# Patient Record
Sex: FEMALE | Race: WHITE | NOT HISPANIC OR LATINO | Employment: OTHER | URBAN - NONMETROPOLITAN AREA
[De-identification: names, ages, dates, MRNs, and addresses within clinical notes are randomized per-mention and may not be internally consistent; named-entity substitution may affect disease eponyms.]

---

## 2017-01-16 ENCOUNTER — TRANSCRIBE ORDERS (OUTPATIENT)
Dept: PHYSICAL THERAPY | Facility: HOSPITAL | Age: 27
End: 2017-01-16

## 2017-01-16 DIAGNOSIS — S13.4XXA SPRAIN OF LIGAMENTS OF CERVICAL SPINE, INITIAL ENCOUNTER: Primary | ICD-10-CM

## 2017-01-18 ENCOUNTER — HOSPITAL ENCOUNTER (OUTPATIENT)
Dept: PHYSICAL THERAPY | Facility: HOSPITAL | Age: 27
Setting detail: THERAPIES SERIES
Discharge: HOME | End: 2017-01-18

## 2017-01-18 DIAGNOSIS — M54.2 NECK PAIN: Primary | ICD-10-CM

## 2017-01-18 PROCEDURE — G8982 BODY POS GOAL STATUS: HCPCS | Performed by: PHYSICAL THERAPIST

## 2017-01-18 PROCEDURE — 97161 PT EVAL LOW COMPLEX 20 MIN: CPT | Performed by: PHYSICAL THERAPIST

## 2017-01-18 PROCEDURE — 97110 THERAPEUTIC EXERCISES: CPT | Performed by: PHYSICAL THERAPIST

## 2017-01-18 PROCEDURE — G8981 BODY POS CURRENT STATUS: HCPCS | Performed by: PHYSICAL THERAPIST
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
## 2017-01-24 ENCOUNTER — HOSPITAL ENCOUNTER (OUTPATIENT)
Dept: PHYSICAL THERAPY | Facility: HOSPITAL | Age: 27
Setting detail: THERAPIES SERIES
Discharge: HOME | End: 2017-01-24

## 2017-01-24 DIAGNOSIS — M54.2 NECK PAIN: Primary | ICD-10-CM

## 2017-01-24 PROCEDURE — 97110 THERAPEUTIC EXERCISES: CPT | Performed by: PHYSICAL THERAPIST

## 2017-01-24 PROCEDURE — 97140 MANUAL THERAPY 1/> REGIONS: CPT | Performed by: PHYSICAL THERAPIST
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
## 2017-01-26 ENCOUNTER — HOSPITAL ENCOUNTER (OUTPATIENT)
Dept: PHYSICAL THERAPY | Facility: HOSPITAL | Age: 27
Setting detail: THERAPIES SERIES
Discharge: HOME | End: 2017-01-26

## 2017-01-26 DIAGNOSIS — M54.2 NECK PAIN: Primary | ICD-10-CM

## 2017-01-26 PROCEDURE — 97110 THERAPEUTIC EXERCISES: CPT

## 2017-01-26 PROCEDURE — 97140 MANUAL THERAPY 1/> REGIONS: CPT

## 2017-01-26 NOTE — PROGRESS NOTES
Outpatient Physical Therapy Ortho Treatment Note  [facility]     Patient Name: Elisa Barrett  : 1990  MRN: [id number]  Today's Date: 2017      Visit Date: 2017    Visit Dx:    ICD-10-CM ICD-9-CM   1. Neck pain M54.2 723.1       There is no problem list on file for this patient.       Past Medical History   Diagnosis Date   • Attention deficit disorder    • Neck pain         No past surgical history on file.                          PT Assessment/Plan       17 1410 17 1348       PT Assessment    Assessment Comments She reports her pain is improving. Her thoracic and upper cervical mobility continue to be hypomobility and therefore limit her ROM and exacerbate her cervicothoracic guarding. We will continue to focus on improving mm guarding and improving ROM as we progress her towards cervical and scapular stability exercises.   -TC Elisa reported less pain today, but is still limited with her cervicothoracic mobiltiy. She forgot her home progam here last visit, so I reprinted and will have her continue on those three activities.   -KR     PT Plan    PT Plan Comments see assessment  -TC continue to work on soft tissue and joint mobiltiy. Progress with posture and scapular control.   -KR       User Key  (r) = Recorded By, (t) = Taken By, (c) = Cosigned By    Initials Name Provider Type    KAIN Tillman, PT Physical Therapist    TC Tierra Lubin, PT Physical Therapist                    Exercises       17 1410          Subjective Comments    Subjective Comments Pt reports she is feeling better.   -TC      Subjective Pain    Able to rate subjective pain? yes  -TC      Pre-Treatment Pain Level 5  -TC      Post-Treatment Pain Level 1  -TC      Exercise 1    Exercise Name 1 thoracic stretch on foam roller    /2   -TC      Cueing 1 Demo  -TC      Time (Seconds) 1 15s   -TC      Exercise 2    Exercise Name 2 chin tucks on /2 foam   -TC      Cueing 2 Demo  -TC      Sets  2 2  -TC      Reps 2 15  -TC      Exercise 3    Exercise Name 3 --  -TC      Cueing 3 --  -TC      Sets 3 --  -TC      Reps 3 --  -TC      Exercise 4    Exercise Name 4 middle trap lift offs    cues for chin tuck   -TC      Cueing 4 Demo  -TC      Sets 4 2  -TC      Reps 4 15  -TC      Exercise 5    Exercise Name 5 1/2 bolster resisted diagonals   -TC      Cueing 5 Demo  -TC      Equipment 5 Theraband  -TC      Resistance 5 Yellow  -TC      Sets 5 2  -TC      Reps 5 15  -TC      Exercise 6    Exercise Name 6 1/2 bolster resisted scapular adduction   -TC      Cueing 6 Demo  -TC      Equipment 6 Theraband  -TC      Resistance 6 Yellow  -TC      Sets 6 2  -TC      Reps 6 15  -TC      Exercise 7    Exercise Name 7 ended on prayer stretch standing and thoracic rotation and SBing stretch    -TC      Sets 7 1  -TC      Reps 7 10  -TC      Time (Seconds) 7 2s   -TC        User Key  (r) = Recorded By, (t) = Taken By, (c) = Cosigned By    Initials Name Provider Type     Tierra Lubin, PT Physical Therapist                        Manual Rx (last 36 hours)      Manual Treatments       01/26/17 1410          Manual Rx 1    Manual Rx 1 Location hooklying; cervical victor manuel, UT, LS, scalenes   -TC      Manual Rx 1 Duration 10  -TC      Manual Rx 2    Manual Rx 2 Location hooklying C3-4 sideglides   -TC      Manual Rx 2 Duration 5   -TC        User Key  (r) = Recorded By, (t) = Taken By, (c) = Cosigned By    Initials Name Provider Type     Tierra Lubin, PT Physical Therapist                PT OP Goals       01/26/17 1410 01/24/17 1348 01/18/17 1441    PT Short Term Goals    STG Date to Achieve 02/01/17  -TC 02/01/17  -KR 02/01/17  -KR    STG 1 Pt will report pain no greater than 3/10 throughout the day.   -TC Pt will report pain no greater than 3/10 throughout the day.   -KR Pt will report pain no greater than 3/10 throughout the day.   -KR    STG 1 Progress Ongoing  -TC Ongoing  -KR New  -KR    STG 1 Progress Comments  5/10 today   -TC 0/10 today, had pain yesterday with chiropracctor   -KR     STG 2 Pt will be I with initial HEP.   -TC Pt will be I with initial HEP.   -KR Pt will be I with initial HEP.   -KR    STG 2 Progress Ongoing  -TC Ongoing  -KR New  -KR    STG 2 Progress Comments reports compliance   -TC reports compliance  -KR     Long Term Goals    LTG Date to Achieve 02/15/17  -TC 02/15/17  -KR 02/15/17  -KR    LTG 1 Pt will be I with HEP to improve posture and decrease strain on muscles and spine   -TC Pt will be I with HEP to improve posture and decrease strain on muscles and spine   -KR Pt will be I with HEP to improve posture and decrease strain on muscles and spine   -KR    LTG 1 Progress Ongoing  -TC Ongoing  -KR New  -KR    LTG 2 Pt will have 65 degrees or greater cervical rotation in order to improve looking over shoulder.   -TC Pt will have 65 degrees or greater cervical rotation in order to improve looking over shoulder.   -KR Pt will have 65 degrees or greater cervical rotation in order to improve looking over shoulder.   -KR    LTG 2 Progress Ongoing  -TC Ongoing  -KR New  -KR    LTG 2 Progress Comments  R 65 L 50  -KR     LTG 3 Pt will have 25 degress or greater cervical lateral sidebending.   -TC Pt will have 25 degress or greater cervical lateral sidebending.   -KR Pt will have 25 degress or greater cervical lateral sidebending.   -KR    LTG 3 Progress Ongoing  -TC Ongoing  -KR New  -KR    LTG 4 Pt will report no interuptions in sleep at night because of neck pain.   -TC Pt will report no interuptions in sleep at night because of neck pain.   -KR Pt will report no interuptions in sleep at night because of neck pain.   -KR    LTG 4 Progress Ongoing  -TC Ongoing  -KR New  -KR    LTG 4 Progress Comments still waking her up but she reports it is less   -TC      Time Calculation    PT Goal Re-Cert Due Date 02/17/17  -TC 02/17/17  -KR 02/17/17  -KR      User Key  (r) = Recorded By, (t) = Taken By, (c) =  Cosigned By    Initials Name Provider Type    KAIN Tillman, PT Physical Therapist    TC Tierra Lubin PT Physical Therapist                Therapy Education       01/26/17 1438    Therapy Education    Given Posture/body mechanics  -TC    Program Reinforced  -TC    How Provided Verbal  -TC    Provided to Patient  -TC    Level of Understanding Verbalized  -TC      User Key  (r) = Recorded By, (t) = Taken By, (c) = Cosigned By    Initials Name Provider Type    TC Tierra Lubin, PT Physical Therapist                Time Calculation:   Start Time: 1410  Stop Time: 1453  Time Calculation (min): 43 min  Total Timed Code Minutes- PT: 43 minute(s)    Therapy Charges for Today     Code Description Service Date Service Provider Modifiers Qty    88841746094 HC PT MANUAL THERAPY EA 15 MIN 1/26/2017 Tierra Lubin, PT GP 1    26432013684 HC PT THER PROC EA 15 MIN 1/26/2017 Tierra Lubin, PT GP 2                    Tierra Lubin, PT  1/26/2017

## 2017-01-31 ENCOUNTER — HOSPITAL ENCOUNTER (OUTPATIENT)
Dept: PHYSICAL THERAPY | Facility: HOSPITAL | Age: 27
Setting detail: THERAPIES SERIES
Discharge: HOME | End: 2017-01-31

## 2017-01-31 DIAGNOSIS — M54.2 NECK PAIN: Primary | ICD-10-CM

## 2017-01-31 PROCEDURE — 97110 THERAPEUTIC EXERCISES: CPT

## 2017-02-02 ENCOUNTER — HOSPITAL ENCOUNTER (OUTPATIENT)
Dept: PHYSICAL THERAPY | Facility: HOSPITAL | Age: 27
Setting detail: THERAPIES SERIES
Discharge: HOME | End: 2017-02-02

## 2017-02-02 DIAGNOSIS — M54.2 NECK PAIN: Primary | ICD-10-CM

## 2017-02-02 PROCEDURE — 97140 MANUAL THERAPY 1/> REGIONS: CPT

## 2017-02-02 PROCEDURE — 97110 THERAPEUTIC EXERCISES: CPT

## 2017-02-02 NOTE — PROGRESS NOTES
Outpatient Physical Therapy Ortho Treatment Note  [facility]     Patient Name: Elisa Barrett  : 1990  MRN: [id number]  Today's Date: 2017      Visit Date: 2017    Visit Dx:    ICD-10-CM ICD-9-CM   1. Neck pain M54.2 723.1       There is no problem list on file for this patient.       Past Medical History   Diagnosis Date   • Attention deficit disorder    • Neck pain         No past surgical history on file.                          PT Assessment/Plan       17 1432 17 1347       PT Assessment    Assessment Comments Pt reported she was flared in her mid back and right side of neck today. 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 Her UT and LS were more taught than last session and referred more pain. After STM and stretching she reported pain relief and demonstrated improved cervical ROM. Her posture and thoracic mobility are improving as well, she still requires cues with exercises to maintain good posturing and technique. I introduced some more scapular staability exercises today, reasses effects next session.   -TC Her upper thoracic and cervical mobility are still limited. I focused on more scapular stability and thoracic and cervical ROM activities today. She is progressing quite well.    -TC     PT Plan    PT Plan Comments Continue to address thoracic and cervical mobility and guarading to improve ROM. We are now progressing towards more scapular stability and strengthening exercises. .  -TC Continue with ROM but focus more towards more stability and scaapular strengthening next session due to her progress.   -TC       User Key  (r) = Recorded By, (t) = Taken By, (c) = Cosigned By    Initials Name Provider Type    TC Tierra Lubin, PT Physical Therapist                    Exercises       17 134          Subjective Comments    Subjective Comments Pt reports that she   -TC      Subjective  Pain    Able to rate subjective pain? yes  -TC      Pre-Treatment Pain Level 6  -TC      Exercise 1    Exercise Name 1 standing mouna stretch   -TC      Cueing 1 Verbal;Tactile;Demo  -TC      Reps 1 3  -TC      Time (Seconds) 1 10s  -TC      Exercise 2    Exercise Name 2 ning UE phasic standing with bolster at spine   -TC      Cueing 2 Verbal;Tactile;Demo  -TC      Equipment 2 Theraband  -TC      Resistance 2 Red  -TC      Sets 2 2  -TC      Reps 2 15  -TC      Exercise 3    Exercise Name 3 middle trap lift offs   -TC      Cueing 3 Verbal;Tactile;Demo  -TC      Sets 3 2  -TC      Reps 3 15  -TC      Exercise 4    Exercise Name 4 lower trap lift offs  -TC      Cueing 4 Verbal;Tactile;Demo  -TC      Sets 4 2  -TC      Reps 4 15  -TC      Exercise 5    Exercise Name 5 push up plus standing using blue physioball   -TC      Cueing 5 Demo  -TC      Sets 5 2  -TC      Reps 5 10  -TC      Exercise 6    Exercise Name 6 thoracic stretch with blue physioball   -TC      Cueing 6 Demo  -TC      Reps 6 3  -TC      Time (Seconds) 6 15s   -TC      Exercise 7    Exercise Name 7 Reviewed upright posture and importance.   -TC      Exercise 8    Exercise Name 8 --  -TC      Cueing 8 --  -TC      Time (Minutes) 8 --  -TC      Exercise 9    Exercise Name 9 --  -TC      Cueing 9 --  -TC      Time (Minutes) 9 --  -TC      Exercise 10    Exercise Name 10 --  -TC      Cueing 10 --  -TC      Time (Minutes) 10 --  -TC        User Key  (r) = Recorded By, (t) = Taken By, (c) = Cosigned By    Initials Name Provider Type    [facility] Shu Lubin PT Physical Therapist                        Manual Rx (last 36 hours)      Manual Treatments       02/02/17 1348          Manual Rx 1    Manual Rx 1 Location hooklying; cervical victor manuel, UT, LS, scalenes   -TC      Manual Rx 1 Duration 10  -TC      Manual Rx 2    Manual Rx 2 Location hooklying C3-4 sideglides   -TC      Manual Rx 2 Duration 5   -TC      Manual Rx 3    Manual Rx 3 Location prone thoracic    -TC      Manual Rx 3 Type ext mob  -TC      Manual Rx 3 Grade 2-3  -TC      Manual Rx 3 Duration 5  -TC      Manual Rx 4    Manual Rx 4 Location LS and UT stretch on R   -TC      Manual Rx 4 Duration 5  -TC        User Key  (r) = Recorded By, (t) = Taken By, (c) = Cosigned By    Initials Name Provider Type    TC Tierra Lubin, PT Physical Therapist                PT OP Goals       02/02/17 1348 01/31/17 1347 01/26/17 1410    PT Short Term Goals    STG Date to Achieve 02/01/17  -TC 02/01/17  -TC 02/01/17  -TC    STG 1 Pt will report pain no greater than 3/10 throughout the day.   -TC Pt will report pain no greater than 3/10 throughout the day.   -TC Pt will report pain no greater than 3/10 throughout the day.   -TC    STG 1 Progress Ongoing  -TC Ongoing  -TC Ongoing  -TC    STG 1 Progress Comments 6/10 today, she drove a lot yesterday   -TC 0/10 today and over weekend per pt   -TC 5/10 today   -TC    STG 2 Pt will be I with initial HEP.   -TC Pt will be I with initial HEP.   -TC Pt will be I with initial HEP.   -TC    STG 2 Progress Ongoing  -TC Ongoing  -TC Ongoing  -TC    STG 2 Progress Comments reports 50% compliance   -TC  reports compliance   -TC    Long Term Goals    LTG Date to Achieve 02/15/17  -TC 02/15/17  -TC 02/15/17  -TC    LTG 1 Pt will be I with HEP to improve posture and decrease strain on muscles and spine   -TC Pt will be I with HEP to improve posture and decrease strain on muscles and spine   -TC Pt will be I with HEP to improve posture and decrease strain on muscles and spine   -TC    LTG 1 Progress Ongoing  -TC Ongoing  -TC Ongoing  -TC    LTG 1 Progress Comments increased at LS and UT on right today, referring pain moreso today   -TC      LTG 2 Pt will have 65 degrees or greater cervical rotation in order to improve looking over shoulder.   -TC Pt will have 65 degrees or greater cervical rotation in order to improve looking over shoulder.   -TC Pt will have 65 degrees or greater cervical  rotation in order to improve looking over shoulder.   -TC    LTG 2 Progress Ongoing  -TC Ongoing  -TC Ongoing  -TC    LTG 3 Pt will have 25 degress or greater cervical lateral sidebending.   -TC Pt will have 25 degress or greater cervical lateral sidebending.   -TC Pt will have 25 degress or greater cervical lateral sidebending.   -TC    LTG 3 Progress Ongoing  -TC Ongoing  -TC Ongoing  -TC    LTG 4 Pt will report no interuptions in sleep at night because of neck pain.   -TC Pt will report no interuptions in sleep at night because of neck pain.   -TC Pt will report no interuptions in sleep at night because of neck pain.   -TC    LTG 4 Progress Ongoing  -TC Ongoing  -TC Ongoing  -TC    LTG 4 Progress Comments  she reports she still has some discomfort at night   -TC still waking her up but she reports it is less   -TC    Time Calculation    PT Goal Re-Cert Due Date 02/17/17  -TC 02/17/17  -TC 02/17/17  -TC      01/24/17 1348          PT Short Term Goals    STG Date to Achieve 02/01/17  -KR      STG 1 Pt will report pain no greater than 3/10 throughout the day.   -KR      STG 1 Progress Ongoing  -KR      STG 1 Progress Comments 0/10 today, had pain yesterday with chiropracctor   -KR      STG 2 Pt will be I with initial HEP.   -KR      STG 2 Progress Ongoing  -KR      STG 2 Progress Comments reports compliance  -KR      Long Term Goals    LTG Date to Achieve 02/15/17  -KR      LTG 1 Pt will be I with HEP to improve posture and decrease strain on muscles and spine   -KR      LTG 1 Progress Ongoing  -KR      LTG 2 Pt will have 65 degrees or greater cervical rotation in order to improve looking over shoulder.   -KR      LTG 2 Progress Ongoing  -KR      LTG 2 Progress Comments R 65 L 50  -KR      LTG 3 Pt will have 25 degress or greater cervical lateral sidebending.   -KR      LTG 3 Progress Ongoing  -KR      LTG 4 Pt will report no interuptions in sleep at night because of neck pain.   -KR      LTG 4 Progress Ongoing  -KR       Time Calculation    PT Goal Re-Cert Due Date 02/17/17  -KR        User Key  (r) = Recorded By, (t) = Taken By, (c) = Cosigned By    Initials Name Provider Type    KAIN Tillman, PT Physical Therapist    TC Tierra Lubin, PT Physical Therapist                Therapy Education       02/02/17 1431    Therapy Education    Given HEP;Posture/body mechanics  -TC    Program Reinforced  -TC    How Provided Verbal;Demonstration  -TC    Provided to Patient;Caregiver  -TC    Level of Understanding Verbalized;Demonstrated  -TC      01/31/17 1434    Therapy Education    Given HEP;Posture/body mechanics  -TC    Program New   added thoracic rotation and  SBing stretch ag wall   -TC    How Provided Verbal;Demonstration;Written  -TC    Provided to Patient  -TC    Level of Understanding Teach back education performed;Verbalized;Demonstrated  -TC      User Key  (r) = Recorded By, (t) = Taken By, (c) = Cosigned By    Initials Name Provider Type    JI Lubin, PT Physical Therapist                Time Calculation:   Start Time: 1348  Stop Time: 1429  Time Calculation (min): 41 min  Total Timed Code Minutes- PT: 41 minute(s)    Therapy Charges for Today     Code Description Service Date Service Provider Modifiers Qty    87580586598 HC PT MANUAL THERAPY EA 15 MIN 2/2/2017 Tierra Lubin, PT GP 2    84090536086 HC PT THER PROC EA 15 MIN 2/2/2017 Tierra Lubin, PT GP 1                    Tierra Lubin, PT  2/2/2017

## 2017-02-07 ENCOUNTER — HOSPITAL ENCOUNTER (OUTPATIENT)
Dept: PHYSICAL THERAPY | Facility: HOSPITAL | Age: 27
Setting detail: THERAPIES SERIES
Discharge: HOME | End: 2017-02-07

## 2017-02-07 DIAGNOSIS — M54.2 NECK PAIN: Primary | ICD-10-CM

## 2017-02-07 PROCEDURE — 97110 THERAPEUTIC EXERCISES: CPT | Performed by: PHYSICAL THERAPIST

## 2017-02-07 PROCEDURE — 97140 MANUAL THERAPY 1/> REGIONS: CPT | Performed by: PHYSICAL THERAPIST

## 2017-02-07 NOTE — PROGRESS NOTES
Outpatient Physical Therapy Ortho Treatment Note  [facility]     Patient Name: Elisa Barrett  : 1990  MRN: [id number]  Today's Date: 2017      Visit Date: 2017    Visit Dx:    ICD-10-CM ICD-9-CM   1. Neck pain M54.2 723.1       There is no problem list on file for this patient.       Past Medical History   Diagnosis Date   • Attention deficit disorder    • Neck pain         No past surgical history on file.                          PT Assessment/Plan       17 1345 17 1432       PT Assessment    Assessment Comments Her cervical rotation is nearly normalized, she is only slightly limited with L rotation. She is able to demontrate and verbalize proper posture, but does report having to remind herself of this throughout the day. We were limited with prone activities and will look to progress this as tolerated.   -KR Pt reported she was flared in her mid back and right side of neck today. 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 Her UT and LS were more taught than last session and referred more pain. After STM and stretching she reported pain relief and demonstrated improved cervical ROM. Her posture and thoracic mobility are improving as well, she still requires cues with exercises to maintain good posturing and technique. I introduced some more scapular staability exercises today, reasses effects next session.   -TC     PT Plan    PT Plan Comments Continue to address posture and progress with strengthening. Progress her HEP next visit as tolerated.   -KR Continue to address thoracic and cervical mobility and guarading to improve ROM. We are now progressing towards more scapular stability and strengthening exercises. .  -TC       User Key  (r) = Recorded By, (t) = Taken By, (c) = Cosigned By    Initials Name Provider Type    KAIN Tillman, PT Physical Therapist    TC Tierra Lubin, PT Physical  "Therapist                    Exercises       02/07/17 1352          Subjective Comments    Subjective Comments She asked to not lay on her stomach today because her \"belly ring\" hurts today. She reports has been working on her posture, occassionaly it will hurt, but no pain currently.   -KR      Subjective Pain    Able to rate subjective pain? yes  -KR      Pre-Treatment Pain Level 0  -KR      Post-Treatment Pain Level 0  -KR      Exercise 1    Exercise Name 1 serratus punches 1 #   -KR      Cueing 1 Verbal;Tactile  -KR      Sets 1 3  -KR      Reps 1 10  -KR      Exercise 2    Exercise Name 2 Standing rows with yellow TB   -KR      Sets 2 3  -KR      Reps 2 10  -KR      Exercise 3    Exercise Name 3 standing against blue foam, UE phasic with yellow TB   -KR      Sets 3 3  -KR      Reps 3 10  -KR      Exercise 4    Exercise Name 4 low doorway pec stretch  -KR      Sets 4 3  -KR      Time (Seconds) 4 30  -KR        User Key  (r) = Recorded By, (t) = Taken By, (c) = Cosigned By    Initials Name Provider Type    KAIN Tillman, PT Physical Therapist                        Manual Rx (last 36 hours)      Manual Treatments       02/07/17 1352          Manual Rx 1    Manual Rx 1 Location hooklying STM to cervical victor manuel, UT/LS L>R   -KR      Manual Rx 1 Duration 10  -KR        User Key  (r) = Recorded By, (t) = Taken By, (c) = Cosigned By    Initials Name Provider Type    KAIN Tillman, PT Physical Therapist                PT OP Goals       02/07/17 1352 02/02/17 1348 01/31/17 1347    PT Short Term Goals    STG Date to Achieve 02/01/17  -KR 02/01/17  -TC 02/01/17  -TC    STG 1 Pt will report pain no greater than 3/10 throughout the day.   -KR Pt will report pain no greater than 3/10 throughout the day.   -TC Pt will report pain no greater than 3/10 throughout the day.   -TC    STG 1 Progress Ongoing;Partially Met  -KR Ongoing  -TC Ongoing  -TC    STG 1 Progress Comments 0/10 today  -KR 6/10 today, she drove a " lot yesterday   -TC 0/10 today and over weekend per pt   -TC    STG 2 Pt will be I with initial HEP.   -KR Pt will be I with initial HEP.   -TC Pt will be I with initial HEP.   -TC    STG 2 Progress Ongoing  -KR Ongoing  -TC Ongoing  -TC    STG 2 Progress Comments verbalizes working on posture  -KR reports 50% compliance   -TC     Long Term Goals    LTG Date to Achieve 02/15/17  -KR 02/15/17  -TC 02/15/17  -TC    LTG 1 Pt will be I with HEP to improve posture and decrease strain on muscles and spine   -KR Pt will be I with HEP to improve posture and decrease strain on muscles and spine   -TC Pt will be I with HEP to improve posture and decrease strain on muscles and spine   -TC    LTG 1 Progress Ongoing  -KR Ongoing  -TC Ongoing  -TC    LTG 1 Progress Comments  increased at LS and UT on right today, referring pain moreso today   -TC     LTG 2 Pt will have 65 degrees or greater cervical rotation in order to improve looking over shoulder.   -KR Pt will have 65 degrees or greater cervical rotation in order to improve looking over shoulder.   -TC Pt will have 65 degrees or greater cervical rotation in order to improve looking over shoulder.   -TC    LTG 2 Progress Ongoing  -KR Ongoing  -TC Ongoing  -TC    LTG 2 Progress Comments R 75 L 64; 6 or 7/10 pain on L side  -KR      LTG 3 Pt will have 25 degress or greater cervical lateral sidebending.   -KR Pt will have 25 degress or greater cervical lateral sidebending.   -TC Pt will have 25 degress or greater cervical lateral sidebending.   -TC    LTG 3 Progress Ongoing  -KR Ongoing  -TC Ongoing  -TC    LTG 4 Pt will report no interuptions in sleep at night because of neck pain.   -KR Pt will report no interuptions in sleep at night because of neck pain.   -TC Pt will report no interuptions in sleep at night because of neck pain.   -TC    LTG 4 Progress Ongoing  -KR Ongoing  -TC Ongoing  -TC    LTG 4 Progress Comments   she reports she still has some discomfort at night   -TC     Time Calculation    PT Goal Re-Cert Due Date 02/17/17  -KR 02/17/17  -TC 02/17/17  -TC      01/26/17 1410          PT Short Term Goals    STG Date to Achieve 02/01/17  -TC      STG 1 Pt will report pain no greater than 3/10 throughout the day.   -TC      STG 1 Progress Ongoing  -TC      STG 1 Progress Comments 5/10 today   -TC      STG 2 Pt will be I with initial HEP.   -TC      STG 2 Progress Ongoing  -TC      STG 2 Progress Comments reports compliance   -TC      Long Term Goals    LTG Date to Achieve 02/15/17  -TC      LTG 1 Pt will be I with HEP to improve posture and decrease strain on muscles and spine   -TC      LTG 1 Progress Ongoing  -TC      LTG 2 Pt will have 65 degrees or greater cervical rotation in order to improve looking over shoulder.   -TC      LTG 2 Progress Ongoing  -TC      LTG 3 Pt will have 25 degress or greater cervical lateral sidebending.   -TC      LTG 3 Progress Ongoing  -TC      LTG 4 Pt will report no interuptions in sleep at night because of neck pain.   -TC      LTG 4 Progress Ongoing  -TC      LTG 4 Progress Comments still waking her up but she reports it is less   -TC      Time Calculation    PT Goal Re-Cert Due Date 02/17/17  -TC        User Key  (r) = Recorded By, (t) = Taken By, (c) = Cosigned By    Initials Name Provider Type    KAIN Tillman, PT Physical Therapist    TC Tierra Lubin, PT Physical Therapist                Therapy Education       02/07/17 1352    Therapy Education    Given HEP;Posture/body mechanics  -KR    Program Reinforced  -KR    How Provided Verbal;Demonstration  -KR    Provided to Patient  -KR    Level of Understanding Verbalized  -KR      02/02/17 1431    Therapy Education    Given HEP;Posture/body mechanics  -TC    Program Reinforced  -TC    How Provided Verbal;Demonstration  -TC    Provided to Patient;Caregiver  -TC    Level of Understanding Verbalized;Demonstrated  -TC      User Key  (r) = Recorded By, (t) = Taken By, (c) = Cosigned By     Initials Name Provider Type    KAIN Tillman, PT Physical Therapist    JI Lubin, PT Physical Therapist                Time Calculation:   Start Time: 1352  Stop Time: 0000  Time Calculation (min): 608 min  Total Timed Code Minutes- PT: 38 minute(s)    Therapy Charges for Today     Code Description Service Date Service Provider Modifiers Qty    32546810944 HC PT THER PROC EA 15 MIN 2/7/2017 Angela Tillman, PT GP 2    10084418370 HC PT MANUAL THERAPY EA 15 MIN 2/7/2017 Angela Tillman, PT GP 1                    Angela Tillman, PT  2/7/2017

## 2017-02-09 ENCOUNTER — HOSPITAL ENCOUNTER (OUTPATIENT)
Dept: PHYSICAL THERAPY | Facility: HOSPITAL | Age: 27
Setting detail: THERAPIES SERIES
Discharge: HOME | End: 2017-02-09

## 2017-02-09 DIAGNOSIS — M54.2 NECK PAIN: Primary | ICD-10-CM

## 2017-02-09 PROCEDURE — 97110 THERAPEUTIC EXERCISES: CPT | Performed by: PHYSICAL THERAPIST

## 2017-02-09 PROCEDURE — 97140 MANUAL THERAPY 1/> REGIONS: CPT | Performed by: PHYSICAL THERAPIST

## 2017-02-09 NOTE — PROGRESS NOTES
Outpatient Physical Therapy Ortho Treatment Note  [facility]     Patient Name: Elisa Barrett  : 1990  MRN: [id number]  Today's Date: 2017      Visit Date: 2017    Visit Dx:    ICD-10-CM ICD-9-CM   1. Neck pain M54.2 723.1       There is no problem list on file for this patient.       Past Medical History   Diagnosis Date   • Attention deficit disorder    • Neck pain         No past surgical history on file.                          PT Assessment/Plan       17 1352 17 1345       PT Assessment    Assessment Comments She is still muscle guarding and thoracic hypomobility on the left side causingg pain and difficulty turning head. She was unable to verbalize her home program so I gave her another printout. She reported leaving her prior printout at home and hasn't been home all week.   -KR Her cervical rotation is nearly normalized, she is only slightly limited with L rotation. She is able to demontrate and verbalize proper posture, but does report having to remind herself of this throughout the day. We were limited with prone activities and will look to progress this as tolerated.   -KR     PT Plan    PT Plan Comments Assess compliace with HEP and progress with thoracic mobiltiy and posture as tolerated.   -KR Continue to address posture and progress with strengthening. Progress her HEP next visit as tolerated.   -KR       User Key  (r) = Recorded By, (t) = Taken By, (c) = Cosigned By    Initials Name Provider Type    KAIN Tillman, PT Physical Therapist                    Exercises       17 1400 17 1352       Subjective Comments    Subjective Comments s  -KR She reports no pain. She reports feeling. She reports still working on her posture. Unable to verbalize her HEP, she reports leaving her written printout at home and hasn't been at her house all week.   -KR     Subjective Pain    Able to rate subjective pain?  yes  -KR     Pre-Treatment Pain Level  0  -KR      Post-Treatment Pain Level  0  -KR     Exercise 1    Exercise Name 1  prone I's   -KR     Cueing 1  Verbal;Tactile  -KR     Sets 1  3  -KR     Reps 1  10  -KR     Exercise 2    Exercise Name 2  1/2 foam roll with intermittent pec stretch   -KR     Reps 2  3  -KR     Time (Seconds) 2  30  -KR     Exercise 3    Exercise Name 3  I gave her another printout of her HEP, thoracic ext on towel roll, UE phasic and prone I's  -KR       User Key  (r) = Recorded By, (t) = Taken By, (c) = Cosigned By    Initials Name Provider Type    KAIN Tillman, PT Physical Therapist                        Manual Rx (last 36 hours)      Manual Treatments       02/09/17 1352          Manual Rx 1    Manual Rx 1 Location prone STM to thoracic paraspinals and rhomb/middle trap  -KR      Manual Rx 1 Grade mod  -KR      Manual Rx 1 Duration 8  -KR      Manual Rx 2    Manual Rx 2 Location prone thoracic  L>R  -KR      Manual Rx 2 Grade 2/3   no cavitations  -KR      Manual Rx 2 Duration 5  -KR      Manual Rx 3    Manual Rx 3 Location hooklying STM to UTLS   -KR      Manual Rx 3 Duration 2  -KR        User Key  (r) = Recorded By, (t) = Taken By, (c) = Cosigned By    Initials Name Provider Type    KAIN Tillman, PT Physical Therapist                PT OP Goals       02/09/17 1352 02/07/17 1352 02/02/17 1348    PT Short Term Goals    STG Date to Achieve 02/01/17  -KR 02/01/17  -KR 02/01/17  -TC    STG 1 Pt will report pain no greater than 3/10 throughout the day.   -KR Pt will report pain no greater than 3/10 throughout the day.   -KR Pt will report pain no greater than 3/10 throughout the day.   -TC    STG 1 Progress Partially Met  -KR Ongoing;Partially Met  -KR Ongoing  -TC    STG 1 Progress Comments 0/10; reports pain two days ago 4-5/10 with poor posture  -KR 0/10 today  -KR 6/10 today, she drove a lot yesterday   -TC    STG 2 Pt will be I with initial HEP.   -KR Pt will be I with initial HEP.   -KR Pt will be I with initial  HEP.   -TC    STG 2 Progress Ongoing  -KR Ongoing  -KR Ongoing  -TC    STG 2 Progress Comments  verbalizes working on posture  -KR reports 50% compliance   -TC    Long Term Goals    LTG Date to Achieve 02/15/17  -KR 02/15/17  -KR 02/15/17  -TC    LTG 1 Pt will be I with HEP to improve posture and decrease strain on muscles and spine   -KR Pt will be I with HEP to improve posture and decrease strain on muscles and spine   -KR Pt will be I with HEP to improve posture and decrease strain on muscles and spine   -TC    LTG 1 Progress Ongoing  -KR Ongoing  -KR Ongoing  -TC    LTG 1 Progress Comments   increased at LS and UT on right today, referring pain moreso today   -TC    LTG 2 Pt will have 65 degrees or greater cervical rotation in order to improve looking over shoulder.   -KR Pt will have 65 degrees or greater cervical rotation in order to improve looking over shoulder.   -KR Pt will have 65 degrees or greater cervical rotation in order to improve looking over shoulder.   -TC    LTG 2 Progress Ongoing  -KR Ongoing  -KR Ongoing  -TC    LTG 2 Progress Comments L 56; pain in L shoulder blade area  -KR R 75 L 64; 6 or 7/10 pain on L side  -KR     LTG 3 Pt will have 25 degress or greater cervical lateral sidebending.   -KR Pt will have 25 degress or greater cervical lateral sidebending.   -KR Pt will have 25 degress or greater cervical lateral sidebending.   -TC    LTG 3 Progress Ongoing  -KR Ongoing  -KR Ongoing  -TC    LTG 3 Progress Comments L 20 R 24   -KR      LTG 4 Pt will report no interuptions in sleep at night because of neck pain.   -KR Pt will report no interuptions in sleep at night because of neck pain.   -KR Pt will report no interuptions in sleep at night because of neck pain.   -TC    LTG 4 Progress Met  -KR Ongoing  -KR Ongoing  -TC    Time Calculation    PT Goal Re-Cert Due Date 02/17/17  -KR 02/17/17  -KR 02/17/17  -TC      01/31/17 1347          PT Short Term Goals    STG Date to Achieve 02/01/17  -TC       STG 1 Pt will report pain no greater than 3/10 throughout the day.   -TC      STG 1 Progress Ongoing  -TC      STG 1 Progress Comments 0/10 today and over weekend per pt   -TC      STG 2 Pt will be I with initial HEP.   -TC      STG 2 Progress Ongoing  -TC      Long Term Goals    LTG Date to Achieve 02/15/17  -TC      LTG 1 Pt will be I with HEP to improve posture and decrease strain on muscles and spine   -TC      LTG 1 Progress Ongoing  -TC      LTG 2 Pt will have 65 degrees or greater cervical rotation in order to improve looking over shoulder.   -TC      LTG 2 Progress Ongoing  -TC      LTG 3 Pt will have 25 degress or greater cervical lateral sidebending.   -TC      LTG 3 Progress Ongoing  -TC      LTG 4 Pt will report no interuptions in sleep at night because of neck pain.   -TC      LTG 4 Progress Ongoing  -TC      LTG 4 Progress Comments she reports she still has some discomfort at night   -TC      Time Calculation    PT Goal Re-Cert Due Date 02/17/17  -TC        User Key  (r) = Recorded By, (t) = Taken By, (c) = Cosigned By    Initials Name Provider Type    KAIN Tillman, PT Physical Therapist    TC Tierra Lubin, PT Physical Therapist                Therapy Education       02/09/17 1352    Therapy Education    Given HEP;Symptoms/condition management;Posture/body mechanics  -KR    Program Progressed  -KR    How Provided Verbal;Demonstration;Written  -KR    Provided to Patient  -KR    Level of Understanding Verbalized  -KR      02/07/17 1352    Therapy Education    Given HEP;Posture/body mechanics  -KR    Program Reinforced  -KR    How Provided Verbal;Demonstration  -KR    Provided to Patient  -KR    Level of Understanding Verbalized  -KR      User Key  (r) = Recorded By, (t) = Taken By, (c) = Cosigned By    Initials Name Provider Type    KAIN Tillman, PT Physical Therapist                Time Calculation:   Start Time: 1352  Stop Time: 1435  Time Calculation (min): 43 min  Total  Timed Code Minutes- PT: 43 minute(s)    Therapy Charges for Today     Code Description Service Date Service Provider Modifiers Qty    35574780495 HC PT MANUAL THERAPY EA 15 MIN 2/9/2017 Angela Tillman, PT GP 1    49359719367 HC PT THER PROC EA 15 MIN 2/9/2017 Angela Tillman, PT GP 2                    Angela Tillman, PT  2/9/2017

## 2017-02-14 ENCOUNTER — HOSPITAL ENCOUNTER (OUTPATIENT)
Dept: PHYSICAL THERAPY | Facility: HOSPITAL | Age: 27
Setting detail: THERAPIES SERIES
Discharge: HOME | End: 2017-02-14

## 2017-02-14 DIAGNOSIS — M54.2 NECK PAIN: Primary | ICD-10-CM

## 2017-02-14 PROCEDURE — 97110 THERAPEUTIC EXERCISES: CPT | Performed by: PHYSICAL THERAPIST

## 2017-02-14 PROCEDURE — 97140 MANUAL THERAPY 1/> REGIONS: CPT | Performed by: PHYSICAL THERAPIST

## 2017-02-14 NOTE — PROGRESS NOTES
Outpatient Physical Therapy Ortho Treatment Note  [facility]     Patient Name: Elisa Barrett  : 1990  MRN: [id number]  Today's Date: 2017      Visit Date: 2017    Visit Dx:    ICD-10-CM ICD-9-CM   1. Neck pain M54.2 723.1       There is no problem list on file for this patient.       Past Medical History   Diagnosis Date   • Attention deficit disorder    • Neck pain         No past surgical history on file.                          PT Assessment/Plan       17 1345 17 1352       PT Assessment    Functional Limitations Performance in leisure activities  -KR      Impairments Posture;Range of motion;Pain;Muscle strength  -KR      Assessment Comments She has met two goals and progressing well towards others. She is slowing impoving her cervical ROM, but still limited with full motion. Her posture has improved, but she still shows rounded shoulder, increased thoracic kyphosis posture. She is also still limtied with thoracic mobiltiy, but we have been limited with prone work secondary to her asking us to avoid prone because her belly button ring.   -KR She is still muscle guarding and thoracic hypomobility on the left side causingg pain and difficulty turning head. She was unable to verbalize her home program so I gave her another printout. She reported leaving her prior printout at home and hasn't been home all week.   -KR     Please refer to paper survey for additional self-reported information Yes  -KR      Rehab Potential Good  -KR      Patient/caregiver participated in establishment of treatment plan and goals Yes  -KR      Patient would benefit from skilled therapy intervention Yes  -KR      PT Plan    PT Frequency 2x/week;1x/week  -KR      Predicted Duration of Therapy Intervention (days/wks) 4 weeks  -KR      Planned CPT's? PT THER PROC EA 15 MIN: 33974;PT THER ACT EA 15 MIN: 23053;PT MANUAL THERAPY EA 15 MIN: 39435;PT ELECTRICAL STIM UNATTEND: ;PT ELECTRICAL STIM ATTD EA  15 MIN: 71930  -KR      PT Plan Comments progress her postural activities as tolerated. Progress to prone acivities, as well.   -KR Assess compliace with HEP and progress with thoracic mobiltiy and posture as tolerated.   -KR       User Key  (r) = Recorded By, (t) = Taken By, (c) = Cosigned By    Initials Name Provider Type    KAIN Tillman, PT Physical Therapist                    Exercises       02/14/17 1345          Subjective Comments    Subjective Comments She reports feeling better. Hurting in the R shoulder/blade area. She reports not exercising today. She reports her belly button ring is bothering her today, so she requests not to lay on her stomach.   -KR      Subjective Pain    Able to rate subjective pain? yes  -KR      Pre-Treatment Pain Level 6  -KR      Post-Treatment Pain Level 0  -KR      Exercise 1    Exercise Name 1 chin tucks  -KR      Cueing 1 Verbal  -KR      Sets 1 2  -KR      Reps 1 10  -KR      Time (Seconds) 1 5  -KR      Exercise 2    Exercise Name 2 1/2 foam roll horx abd with yellow TB   -KR      Cueing 2 Tactile;Verbal  -KR      Sets 2 3  -KR      Reps 2 10  -KR      Exercise 3    Exercise Name 3 standing rows with yellow TB   -KR      Sets 3 3  -KR      Reps 3 10  -KR      Exercise 4    Exercise Name 4 UE phasic with yellow TB   -KR      Sets 4 2  -KR      Reps 4 10  -KR        User Key  (r) = Recorded By, (t) = Taken By, (c) = Cosigned By    Initials Name Provider Type    KAIN Tillman PT Physical Therapist                        Manual Rx (last 36 hours)      Manual Treatments       02/14/17 1345          Manual Rx 1    Manual Rx 1 Location hookllying STM to B UT/LS and cervical paraspinals and R SCM   -KR      Manual Rx 1 Grade mod   -KR      Manual Rx 1 Duration 12  -KR        User Key  (r) = Recorded By, (t) = Taken By, (c) = Cosigned By    Initials Name Provider Type    KAIN Tillman, NALLELY Physical Therapist                PT OP Goals       02/14/17 0775  02/09/17 1352 02/07/17 1352    PT Short Term Goals    STG Date to Achieve 02/28/17  -KR 02/01/17  -KR 02/01/17  -KR    STG 1 Pt will report pain no greater than 3/10 throughout the day.   -KR Pt will report pain no greater than 3/10 throughout the day.   -KR Pt will report pain no greater than 3/10 throughout the day.   -KR    STG 1 Progress Partially Met  -KR Partially Met  -KR Ongoing;Partially Met  -KR    STG 1 Progress Comments up to 6/10 today and over the weekend, but not continuous.   -KR 0/10; reports pain two days ago 4-5/10 with poor posture  -KR 0/10 today  -KR    STG 2 Pt will be I with initial HEP.   -KR Pt will be I with initial HEP.   -KR Pt will be I with initial HEP.   -KR    STG 2 Progress Met  -KR Ongoing  -KR Ongoing  -KR    STG 2 Progress Comments   verbalizes working on posture  -KR    Long Term Goals    LTG Date to Achieve 03/16/17  -KR 02/15/17  -KR 02/15/17  -KR    LTG 1 Pt will be I with HEP to improve posture and decrease strain on muscles and spine   -KR Pt will be I with HEP to improve posture and decrease strain on muscles and spine   -KR Pt will be I with HEP to improve posture and decrease strain on muscles and spine   -KR    LTG 1 Progress Ongoing  -KR Ongoing  -KR Ongoing  -KR    LTG 2 Pt will have 65 degrees or greater cervical rotation in order to improve looking over shoulder.   -KR Pt will have 65 degrees or greater cervical rotation in order to improve looking over shoulder.   -KR Pt will have 65 degrees or greater cervical rotation in order to improve looking over shoulder.   -KR    LTG 2 Progress Ongoing;Partially Met  -KR Ongoing  -KR Ongoing  -KR    LTG 2 Progress Comments 55 L 75 R; pain to L   -KR L 56; pain in L shoulder blade area  -KR R 75 L 64; 6 or 7/10 pain on L side  -KR    LTG 3 Pt will have 25 degress or greater cervical lateral sidebending.   -KR Pt will have 25 degress or greater cervical lateral sidebending.   -KR Pt will have 25 degress or greater cervical  lateral sidebending.   -KR    LTG 3 Progress Ongoing  -KR Ongoing  -KR Ongoing  -KR    LTG 3 Progress Comments 35 R 25 L pain to L   -KR L 20 R 24   -KR     LTG 4 Pt will report no interuptions in sleep at night because of neck pain.   -KR Pt will report no interuptions in sleep at night because of neck pain.   -KR Pt will report no interuptions in sleep at night because of neck pain.   -KR    LTG 4 Progress Met  -KR Met  -KR Ongoing  -KR    Time Calculation    PT Goal Re-Cert Due Date 03/16/17  -KR 02/17/17  -KR 02/17/17  -KR      02/02/17 1348          PT Short Term Goals    STG Date to Achieve 02/01/17  -TC      STG 1 Pt will report pain no greater than 3/10 throughout the day.   -TC      STG 1 Progress Ongoing  -TC      STG 1 Progress Comments 6/10 today, she drove a lot yesterday   -TC      STG 2 Pt will be I with initial HEP.   -TC      STG 2 Progress Ongoing  -TC      STG 2 Progress Comments reports 50% compliance   -TC      Long Term Goals    LTG Date to Achieve 02/15/17  -TC      LTG 1 Pt will be I with HEP to improve posture and decrease strain on muscles and spine   -TC      LTG 1 Progress Ongoing  -TC      LTG 1 Progress Comments increased at LS and UT on right today, referring pain moreso today   -TC      LTG 2 Pt will have 65 degrees or greater cervical rotation in order to improve looking over shoulder.   -TC      LTG 2 Progress Ongoing  -TC      LTG 3 Pt will have 25 degress or greater cervical lateral sidebending.   -TC      LTG 3 Progress Ongoing  -TC      LTG 4 Pt will report no interuptions in sleep at night because of neck pain.   -TC      LTG 4 Progress Ongoing  -TC      Time Calculation    PT Goal Re-Cert Due Date 02/17/17  -TC        User Key  (r) = Recorded By, (t) = Taken By, (c) = Cosigned By    Initials Name Provider Type    KAIN Tillman, PT Physical Therapist    JI Lubin, PT Physical Therapist                Therapy Education       02/14/17 1345    Therapy Education     Given Symptoms/condition management;HEP;Posture/body mechanics  -KR    Program Reinforced  -KR    How Provided Verbal;Demonstration  -KR    Provided to Patient  -KR    Level of Understanding Verbalized  -KR      02/09/17 1352    Therapy Education    Given HEP;Symptoms/condition management;Posture/body mechanics  -KR    Program Progressed  -KR    How Provided Verbal;Demonstration;Written  -KR    Provided to Patient  -KR    Level of Understanding Verbalized  -KR      User Key  (r) = Recorded By, (t) = Taken By, (c) = Cosigned By    Initials Name Provider Type    KAIN Tillman, PT Physical Therapist                Time Calculation:   Start Time: 1345  Stop Time: 1428  Time Calculation (min): 43 min  Total Timed Code Minutes- PT: 43 minute(s)    Therapy Charges for Today     Code Description Service Date Service Provider Modifiers Qty    39799348219 HC PT MANUAL THERAPY EA 15 MIN 2/14/2017 Angela Tillman, PT GP 1    [id number] HC PT THER PROC EA 15 MIN 2/14/2017 Angela Tillman, PT GP 2                    Angela Tillman, PT  2/14/2017

## 2017-02-16 ENCOUNTER — HOSPITAL ENCOUNTER (OUTPATIENT)
Dept: PHYSICAL THERAPY | Facility: HOSPITAL | Age: 27
Setting detail: THERAPIES SERIES
Discharge: HOME | End: 2017-02-16

## 2017-02-16 DIAGNOSIS — M54.2 NECK PAIN: Primary | ICD-10-CM

## 2017-02-16 PROCEDURE — 97110 THERAPEUTIC EXERCISES: CPT | Performed by: PHYSICAL THERAPIST

## 2017-02-16 PROCEDURE — G8982 BODY POS GOAL STATUS: HCPCS | Performed by: PHYSICAL THERAPIST

## 2017-02-16 PROCEDURE — G8981 BODY POS CURRENT STATUS: HCPCS | Performed by: PHYSICAL THERAPIST

## 2017-02-16 PROCEDURE — 97140 MANUAL THERAPY 1/> REGIONS: CPT | Performed by: PHYSICAL THERAPIST

## 2017-02-16 NOTE — PROGRESS NOTES
Outpatient Physical Therapy Ortho Treatment Note  [facility]     Patient Name: Elisa Barrett  : 1990  MRN: [id number]  Today's Date: 2017      Visit Date: 2017    Visit Dx:    ICD-10-CM ICD-9-CM   1. Neck pain M54.2 723.1       There is no problem list on file for this patient.       Past Medical History   Diagnosis Date   • Attention deficit disorder    • Neck pain         No past surgical history on file.                          PT Assessment/Plan       17 1316 17 1345       PT Assessment    Functional Limitations  Performance in leisure activities  -KR     Impairments  Posture;Range of motion;Pain;Muscle strength  -KR     Assessment Comments She was 16 minutes late to her appointment today, so treatment was shortened. She has met her sidebending goal, but is still restricted with L rotation. Her L 1st rib was slightly elevated with Ut/LS guarding. She is continuing to work on her posture, but it appears to be a limiting factor. We are going to focus more on scapular strength the next few visits and progress towards discharge.   -KR She has met two goals and progressing well towards others. She is slowing impoving her cervical ROM, but still limited with full motion. Her posture has improved, but she still shows rounded shoulder, increased thoracic kyphosis posture. She is also still limtied with thoracic mobiltiy, but we have been limited with prone work secondary to her asking us to avoid prone because her belly button ring.   -KR     Please refer to paper survey for additional self-reported information  Yes  -KR     Rehab Potential  Good  -KR     Patient/caregiver participated in establishment of treatment plan and goals  Yes  -KR     Patient would benefit from skilled therapy intervention  Yes  -KR     PT Plan    PT Frequency  2x/week;1x/week  -KR     Predicted Duration of Therapy Intervention (days/wks)  4 weeks  -KR     Planned CPT's?  PT THER PROC EA 15 MIN: 14897;PT  THER ACT EA 15 MIN: 71480;PT MANUAL THERAPY EA 15 MIN: 12791;PT ELECTRICAL STIM UNATTEND: ;PT ELECTRICAL STIM ATTD EA 15 MIN: 58850  -KR     PT Plan Comments see assessment.   -KR progress her postural activities as tolerated. Progress to prone acivities, as well.   -KR       User Key  (r) = Recorded By, (t) = Taken By, (c) = Cosigned By    Initials Name Provider Type    KAIN Tillman, NALLELY Physical Therapist                    Exercises       02/16/17 1316          Subjective Comments    Subjective Comments She says no pain currently; but had pain in B shoulder while walking at the park today 4/10. She reports she can lay on her stomach today.   -KR      Subjective Pain    Able to rate subjective pain? yes  -KR      Pre-Treatment Pain Level 0  -KR      Post-Treatment Pain Level 0  -KR      Exercise 1    Exercise Name 1 prone I's  -KR      Cueing 1 Verbal;Tactile  -KR      Exercise 2    Exercise Name 2 mid trap level 1  -KR      Cueing 2 Verbal;Tactile  -KR      Sets 2 2  -KR      Reps 2 10  -KR      Exercise 3    Exercise Name 3 1/2 foam horx abd   -KR      Sets 3 2  -KR      Reps 3 10  -KR        User Key  (r) = Recorded By, (t) = Taken By, (c) = Cosigned By    Initials Name Provider Type    KAIN Tillman PT Physical Therapist                        Manual Rx (last 36 hours)      Manual Treatments       02/16/17 1316          Manual Rx 1    Manual Rx 1 Location prone thoracic   -KR      Manual Rx 1 Grade 3/4   no cavitations  -KR      Manual Rx 1 Duration 4  -KR      Manual Rx 2    Manual Rx 2 Location prone STM L UT/LS  -KR      Manual Rx 2 Grade mod  -KR      Manual Rx 2 Duration 4  -KR      Manual Rx 3    Manual Rx 3 Location L 1st rib depression  -KR      Manual Rx 3 Grade 2/3  -KR      Manual Rx 3 Duration 3  -KR        User Key  (r) = Recorded By, (t) = Taken By, (c) = Cosigned By    Initials Name Provider Type    KAIN Tillman, PT Physical Therapist                PT OP Goals        02/16/17 1316 02/14/17 1345 02/09/17 1352    PT Short Term Goals    STG Date to Achieve 02/28/17  -KR 02/28/17  -KR 02/01/17  -KR    STG 1 Pt will report pain no greater than 3/10 throughout the day.   -KR Pt will report pain no greater than 3/10 throughout the day.   -KR Pt will report pain no greater than 3/10 throughout the day.   -KR    STG 1 Progress Partially Met  -KR Partially Met  -KR Partially Met  -KR    STG 1 Progress Comments  up to 6/10 today and over the weekend, but not continuous.   -KR 0/10; reports pain two days ago 4-5/10 with poor posture  -KR    STG 2 Pt will be I with initial HEP.   -KR Pt will be I with initial HEP.   -KR Pt will be I with initial HEP.   -KR    STG 2 Progress Met  -KR Met  -KR Ongoing  -KR    Long Term Goals    LTG Date to Achieve 03/16/17  -KR 03/16/17  -KR 02/15/17  -KR    LTG 1 Pt will be I with HEP to improve posture and decrease strain on muscles and spine   -KR Pt will be I with HEP to improve posture and decrease strain on muscles and spine   -KR Pt will be I with HEP to improve posture and decrease strain on muscles and spine   -KR    LTG 1 Progress Ongoing  -KR Ongoing  -KR Ongoing  -KR    LTG 2 Pt will have 65 degrees or greater cervical rotation in order to improve looking over shoulder.   -KR Pt will have 65 degrees or greater cervical rotation in order to improve looking over shoulder.   -KR Pt will have 65 degrees or greater cervical rotation in order to improve looking over shoulder.   -KR    LTG 2 Progress Ongoing;Partially Met  -KR Ongoing;Partially Met  -KR Ongoing  -KR    LTG 2 Progress Comments 58L 72R ; pain L   -KR 55 L 75 R; pain to L   -KR L 56; pain in L shoulder blade area  -KR    LTG 3 Pt will have 25 degress or greater cervical lateral sidebending.   -KR Pt will have 25 degress or greater cervical lateral sidebending.   -KR Pt will have 25 degress or greater cervical lateral sidebending.   -KR    LTG 3 Progress Met  -KR Ongoing  -KR Ongoing   -KR    LTG 3 Progress Comments but did have pain to the L  -KR 35 R 25 L pain to L   -KR L 20 R 24   -KR    LTG 4 Pt will report no interuptions in sleep at night because of neck pain.   -KR Pt will report no interuptions in sleep at night because of neck pain.   -KR Pt will report no interuptions in sleep at night because of neck pain.   -KR    LTG 4 Progress Met  -KR Met  -KR Met  -KR    Time Calculation    PT Goal Re-Cert Due Date 03/16/17  -KR 03/16/17  -KR 02/17/17  -KR      02/07/17 1352          PT Short Term Goals    STG Date to Achieve 02/01/17  -KR      STG 1 Pt will report pain no greater than 3/10 throughout the day.   -KR      STG 1 Progress Ongoing;Partially Met  -KR      STG 1 Progress Comments 0/10 today  -KR      STG 2 Pt will be I with initial HEP.   -KR      STG 2 Progress Ongoing  -KR      STG 2 Progress Comments verbalizes working on posture  -KR      Long Term Goals    LTG Date to Achieve 02/15/17  -KR      LTG 1 Pt will be I with HEP to improve posture and decrease strain on muscles and spine   -KR      LTG 1 Progress Ongoing  -KR      LTG 2 Pt will have 65 degrees or greater cervical rotation in order to improve looking over shoulder.   -KR      LTG 2 Progress Ongoing  -KR      LTG 2 Progress Comments R 75 L 64; 6 or 7/10 pain on L side  -KR      LTG 3 Pt will have 25 degress or greater cervical lateral sidebending.   -KR      LTG 3 Progress Ongoing  -KR      LTG 4 Pt will report no interuptions in sleep at night because of neck pain.   -KR      LTG 4 Progress Ongoing  -KR      Time Calculation    PT Goal Re-Cert Due Date 02/17/17  -KR        User Key  (r) = Recorded By, (t) = Taken By, (c) = Cosigned By    Initials Name Provider Type    KAIN Tillman PT Physical Therapist                Therapy Education       02/16/17 1316    Therapy Education    Given Symptoms/condition management;Posture/body mechanics  -KR    Program Reinforced  -KR    How Provided Verbal  -KR    Provided to  Patient  -KR    Level of Understanding Verbalized  -KR      02/14/17 1345    Therapy Education    Given Symptoms/condition management;HEP;Posture/body mechanics  -KR    Program Reinforced  -KR    How Provided Verbal;Demonstration  -KR    Provided to Patient  -KR    Level of Understanding Verbalized  -KR      User Key  (r) = Recorded By, (t) = Taken By, (c) = Cosigned By    Initials Name Provider Type    KAIN Tillman, PT Physical Therapist                Time Calculation:   Start Time: 1316  Stop Time: 1345  Time Calculation (min): 29 min  Total Timed Code Minutes- PT: 29 minute(s)    Therapy Charges for Today     Code Description Service Date Service Provider Modifiers Qty    37850997868  PT THER PROC EA 15 MIN 2/16/2017 Angela Tillman, PT GP 1    [id number]  PT MANUAL THERAPY EA 15 MIN 2/16/2017 Angela Tillman, PT GP 1                    Angela Tillman, PT  2/16/2017

## 2017-02-21 ENCOUNTER — HOSPITAL ENCOUNTER (OUTPATIENT)
Dept: PHYSICAL THERAPY | Facility: HOSPITAL | Age: 27
Setting detail: THERAPIES SERIES
Discharge: HOME | End: 2017-02-21

## 2017-02-21 DIAGNOSIS — M54.2 NECK PAIN: Primary | ICD-10-CM

## 2017-02-21 PROCEDURE — 97110 THERAPEUTIC EXERCISES: CPT | Performed by: PHYSICAL THERAPIST

## 2017-02-21 PROCEDURE — 97140 MANUAL THERAPY 1/> REGIONS: CPT | Performed by: PHYSICAL THERAPIST

## 2017-02-21 NOTE — PROGRESS NOTES
Outpatient Physical Therapy Ortho Treatment Note  [facility]     Patient Name: Elisa Barrett  : 1990  MRN: [id number]  Today's Date: 2017      Visit Date: 2017    Visit Dx:    ICD-10-CM ICD-9-CM   1. Neck pain M54.2 723.1       There is no problem list on file for this patient.       Past Medical History   Diagnosis Date   • Attention deficit disorder    • Neck pain         No past surgical history on file.                          PT Assessment/Plan       17 1514 17 1316       PT Assessment    Assessment Comments Her cervical ROM is slowly improving. Her L 1st rib was still elevated and hypomobile. She requested not to lay on her stomach, so I could not fully assess her thoracic spine.  We progressed with postural strengthening today.   -KR She was 16 minutes late to her appointment today, so treatment was shortened. She has met her sidebending goal, but is still restricted with L rotation. Her L 1st rib was slightly elevated with Ut/LS guarding. She is continuing to work on her posture, but it appears to be a limiting factor. We are going to focus more on scapular strength the next few visits and progress towards discharge.   -KR     PT Plan    PT Plan Comments We will continue to work 1st rib nd thoracaic mobiltiy, as tolerated. Progress HEP and postural activities as tolerated.   -KR see assessment.   -KR       User Key  (r) = Recorded By, (t) = Taken By, (c) = Cosigned By    Initials Name Provider Type    KAIN Tillman, PT Physical Therapist                    Exercises       17 1514          Subjective Comments    Subjective Comments She reports her stomach hurts from eating too much pretzels., she repots she can't ly on her stomach today.   -KR      Subjective Pain    Able to rate subjective pain? yes  -KR      Pre-Treatment Pain Level 0  -KR      Post-Treatment Pain Level 0  -KR      Exercise 1    Exercise Name 1 chin tucks with rotation  -KR      Cueing 1  Verbal;Demo  -KR      Sets 1 2  -KR      Reps 1 10  -KR      Exercise 2    Exercise Name 2 on pink foam thoracic extension  -KR      Time (Minutes) 2 5  -KR      Exercise 3    Exercise Name 3 standing against foam roll diagonals with yellow TB   -KR      Sets 3 2  -KR      Reps 3 10  -KR      Exercise 4    Exercise Name 4 UE phasic with yellow TB standing against foam  -KR      Sets 4 2  -KR      Reps 4 15  -KR        User Key  (r) = Recorded By, (t) = Taken By, (c) = Cosigned By    Initials Name Provider Type    KAIN Tillman PT Physical Therapist                        Manual Rx (last 36 hours)      Manual Treatments       02/21/17 1514          Manual Rx 1    Manual Rx 1 Location hooklying STM to UT/LS L>R  -KR      Manual Rx 1 Grade mod  -KR      Manual Rx 1 Duration 7  -KR      Manual Rx 2    Manual Rx 2 Location hooklying L 1st rib depression   -KR      Manual Rx 2 Grade 2/3  -KR      Manual Rx 2 Duration 5  -KR        User Key  (r) = Recorded By, (t) = Taken By, (c) = Cosigned By    Initials Name Provider Type    KAIN Tillman, PT Physical Therapist                PT OP Goals       02/21/17 1514 02/16/17 1316 02/14/17 1345    PT Short Term Goals    STG Date to Achieve 02/28/17  -KR 02/28/17  -KR 02/28/17  -KR    STG 1 Pt will report pain no greater than 3/10 throughout the day.   -KR Pt will report pain no greater than 3/10 throughout the day.   -KR Pt will report pain no greater than 3/10 throughout the day.   -KR    STG 1 Progress Met  -KR Partially Met  -KR Partially Met  -KR    STG 1 Progress Comments since last visit 0/10   -KR  up to 6/10 today and over the weekend, but not continuous.   -KR    STG 2 Pt will be I with initial HEP.   -KR Pt will be I with initial HEP.   -KR Pt will be I with initial HEP.   -KR    STG 2 Progress Met  -KR Met  -KR Met  -KR    Long Term Goals    LTG Date to Achieve 03/16/17  -KR 03/16/17  -KR 03/16/17  -KR    LTG 1 Pt will be I with HEP to improve posture  and decrease strain on muscles and spine   -KR Pt will be I with HEP to improve posture and decrease strain on muscles and spine   -KR Pt will be I with HEP to improve posture and decrease strain on muscles and spine   -KR    LTG 1 Progress Ongoing  -KR Ongoing  -KR Ongoing  -KR    LTG 2 Pt will have 65 degrees or greater cervical rotation in order to improve looking over shoulder.   -KR Pt will have 65 degrees or greater cervical rotation in order to improve looking over shoulder.   -KR Pt will have 65 degrees or greater cervical rotation in order to improve looking over shoulder.   -KR    LTG 2 Progress Ongoing;Partially Met  -KR Ongoing;Partially Met  -KR Ongoing;Partially Met  -KR    LTG 2 Progress Comments L 62 R 80  -KR 58L 72R ; pain L   -KR 55 L 75 R; pain to L   -KR    LTG 3 Pt will have 25 degress or greater cervical lateral sidebending.   -KR Pt will have 25 degress or greater cervical lateral sidebending.   -KR Pt will have 25 degress or greater cervical lateral sidebending.   -KR    LTG 3 Progress Met  -KR Met  -KR Ongoing  -KR    LTG 3 Progress Comments  but did have pain to the L  -KR 35 R 25 L pain to L   -KR    LTG 4 Pt will report no interuptions in sleep at night because of neck pain.   -KR Pt will report no interuptions in sleep at night because of neck pain.   -KR Pt will report no interuptions in sleep at night because of neck pain.   -KR    LTG 4 Progress Met  -KR Met  -KR Met  -KR    Time Calculation    PT Goal Re-Cert Due Date 03/16/17  -KR 03/16/17  -KR 03/16/17  -KR      02/09/17 1352          PT Short Term Goals    STG Date to Achieve 02/01/17  -KR      STG 1 Pt will report pain no greater than 3/10 throughout the day.   -KR      STG 1 Progress Partially Met  -KR      STG 1 Progress Comments 0/10; reports pain two days ago 4-5/10 with poor posture  -KR      STG 2 Pt will be I with initial HEP.   -KR      STG 2 Progress Ongoing  -KR      Long Term Goals    LTG Date to Achieve 02/15/17  -KR       LTG 1 Pt will be I with HEP to improve posture and decrease strain on muscles and spine   -KR      LTG 1 Progress Ongoing  -KR      LTG 2 Pt will have 65 degrees or greater cervical rotation in order to improve looking over shoulder.   -KR      LTG 2 Progress Ongoing  -KR      LTG 2 Progress Comments L 56; pain in L shoulder blade area  -KR      LTG 3 Pt will have 25 degress or greater cervical lateral sidebending.   -KR      LTG 3 Progress Ongoing  -KR      LTG 3 Progress Comments L 20 R 24   -KR      LTG 4 Pt will report no interuptions in sleep at night because of neck pain.   -KR      LTG 4 Progress Met  -KR      Time Calculation    PT Goal Re-Cert Due Date 02/17/17  -KR        User Key  (r) = Recorded By, (t) = Taken By, (c) = Cosigned By    Initials Name Provider Type    KAIN Tillman PT Physical Therapist                Therapy Education       02/21/17 1514    Therapy Education    Given Symptoms/condition management;Posture/body mechanics  -KR    Program Reinforced  -KR    How Provided Verbal  -KR    Provided to Patient  -KR    Level of Understanding Verbalized  -KR      02/16/17 1316    Therapy Education    Given Symptoms/condition management;Posture/body mechanics  -KR    Program Reinforced  -KR    How Provided Verbal  -KR    Provided to Patient  -KR    Level of Understanding Verbalized  -KR      User Key  (r) = Recorded By, (t) = Taken By, (c) = Cosigned By    Initials Name Provider Type    KAIN Tillman PT Physical Therapist                Time Calculation:   Start Time: 1514  Stop Time: 1600  Time Calculation (min): 46 min  Total Timed Code Minutes- PT: 45 minute(s)    Therapy Charges for Today     Code Description Service Date Service Provider Modifiers Qty    14595358800 HC PT MANUAL THERAPY EA 15 MIN 2/21/2017 Angela Tillman, PT GP 1    61766017723 HC PT THER PROC EA 15 MIN 2/21/2017 Angela Tillman, PT GP 2                    Angela Tillman, PT  2/21/2017

## 2017-03-01 ENCOUNTER — HOSPITAL ENCOUNTER (OUTPATIENT)
Dept: PHYSICAL THERAPY | Facility: HOSPITAL | Age: 27
Setting detail: THERAPIES SERIES
Discharge: HOME | End: 2017-03-01

## 2017-03-01 DIAGNOSIS — M54.2 NECK PAIN: Primary | ICD-10-CM

## 2017-03-01 PROCEDURE — 97140 MANUAL THERAPY 1/> REGIONS: CPT

## 2017-03-01 PROCEDURE — 97110 THERAPEUTIC EXERCISES: CPT

## 2017-03-01 NOTE — PROGRESS NOTES
"    Outpatient Physical Therapy Ortho Treatment Note  [facility]     Patient Name: Elisa Barrett  : 1990  MRN: [id number]  Today's Date: 3/1/2017      Visit Date: 2017    Visit Dx:    ICD-10-CM ICD-9-CM   1. Neck pain M54.2 723.1       There is no problem list on file for this patient.       Past Medical History   Diagnosis Date   • Attention deficit disorder    • Neck pain         No past surgical history on file.                          PT Assessment/Plan       17 1300       PT Assessment    Assessment Comments Her 1st rib was still elevated on the L today but this improved post manual therapy and she reported less \"soreness\" afterwards. Her cervical AROM is mostlly full and painfree. She still required cues to avoid overuse of UT and scapular elevation but this is much improved from our initial sessions. We will continue to work to achieve last few degrees of cervical rotation and scapular strenghthening.   -TC     PT Plan    PT Plan Comments 1st rib depression, cervical rotation improvements, thoracic ROM/mobility and scapular strengthening.   -TC       User Key  (r) = Recorded By, (t) = Taken By, (c) = Cosigned By    Initials Name Provider Type    TC Tierra Lubin, PT Physical Therapist                    Exercises       17 1356          Subjective Comments    Subjective Comments She reports that her neck is doing much better. It hurts every now and then. Her ROM is improving.   -TC      Subjective Pain    Able to rate subjective pain? yes  -TC      Pre-Treatment Pain Level 0  -TC      Post-Treatment Pain Level 0  -TC      Exercise 1    Exercise Name 1 standing LS strech   -TC      Reps 1 2  -TC      Time (Seconds) 1 15s  -TC      Exercise 2    Exercise Name 2 thoracic rotation then extension mobility stretch ag wall   -TC      Reps 2 5  -TC      Time (Minutes) 2 holding 5s ea  -TC      Exercise 3    Exercise Name 3 standing against foam roll diagonals with yellow TB   -TC   "    Sets 3 2  -TC      Reps 3 10  -TC      Exercise 4    Exercise Name 4 UE phasic with yellow TB standing against foam  -TC      Sets 4 2  -TC      Reps 4 15  -TC      Exercise 5    Exercise Name 5 ipsilateral thoracic and ccervical rotation standing ag bolster    holding 35cm physio  -TC      Cueing 5 Demo  -TC      Sets 5 2  -TC      Reps 5 10  -TC      Exercise 6    Exercise Name 6 contralateral thoracic and ccervical rotation standing ag bolster    holding 35cm physio  -TC      Cueing 6 Demo  -TC      Sets 6 2  -TC      Reps 6 10  -TC      Exercise 7    Exercise Name 7 standing lower trap lift offs   -TC      Cueing 7 Demo  -TC      Sets 7 2  -TC      Reps 7 15  -TC      Exercise 8    Exercise Name 8 standing middle trap lift offs   -TC      Cueing 8 Demo  -TC      Sets 8 2  -TC      Reps 8 15  -TC      Exercise 9    Exercise Name 9 attempted standing at 45 deg pot stirrers but she reported increased shoulder pain   -TC      Exercise 10    Exercise Name 10 SA wall slides   -TC      Cueing 10 Verbal  -TC      Sets 10 2  -TC      Reps 10 10  -TC        User Key  (r) = Recorded By, (t) = Taken By, (c) = Cosigned By    Initials Name Provider Type    TC Tierra Lubin, PT Physical Therapist                        Manual Rx (last 36 hours)      Manual Treatments       03/01/17 1356          Manual Rx 1    Manual Rx 1 Location hooklying STM to UT/LS L>R  -TC      Manual Rx 1 Grade mod  -TC      Manual Rx 1 Duration 7  -TC      Manual Rx 2    Manual Rx 2 Location hooklying L 1st rib depression   -TC      Manual Rx 2 Grade 2/3  -TC      Manual Rx 2 Duration 8  -TC        User Key  (r) = Recorded By, (t) = Taken By, (c) = Cosigned By    Initials Name Provider Type    TC Tierra Lubin, PT Physical Therapist                PT OP Goals       03/01/17 1300       PT Short Term Goals    STG Date to Achieve 02/28/17  -TC     STG 1 Pt will report pain no greater than 3/10 throughout the day.   -TC     STG 1 Progress Met   -TC     STG 2 Pt will be I with initial HEP.   -TC     STG 2 Progress Met  -TC     Long Term Goals    LTG Date to Achieve 03/16/17  -TC     LTG 1 Pt will be I with HEP to improve posture and decrease strain on muscles and spine   -TC     LTG 1 Progress Ongoing  -TC     LTG 1 Progress Comments she reports compliance and daily walking; still needs cues for upright posture 25%   -TC     LTG 2 Pt will have 65 degrees or greater cervical rotation in order to improve looking over shoulder.   -TC     LTG 2 Progress Ongoing;Partially Met  -TC     LTG 3 Pt will have 25 degress or greater cervical lateral sidebending.   -TC     LTG 3 Progress Met  -TC     LTG 4 Pt will report no interuptions in sleep at night because of neck pain.   -TC     LTG 4 Progress Met  -TC     Time Calculation    PT Goal Re-Cert Due Date 03/16/17  -TC       User Key  (r) = Recorded By, (t) = Taken By, (c) = Cosigned By    Initials Name Provider Type    TC Tierra Lubin PT Physical Therapist                Therapy Education       03/01/17 1443          Therapy Education    Given HEP;Posture/body mechanics  -TC      Program New  -TC      How Provided Verbal  -TC      Provided to Patient  -TC      Level of Understanding Verbalized  -TC        User Key  (r) = Recorded By, (t) = Taken By, (c) = Cosigned By    Initials Name Provider Type    TC Tierra Lubin PT Physical Therapist                Time Calculation:   Start Time: 1356  Stop Time: 1441  Time Calculation (min): 45 min  Total Timed Code Minutes- PT: 45 minute(s)    Therapy Charges for Today     Code Description Service Date Service Provider Modifiers Qty    14282169820 HC PT MANUAL THERAPY EA 15 MIN 3/1/2017 Tierra Lubin, PT GP 1    [id number] HC PT THER PROC EA 15 MIN 3/1/2017 Tierra Lubin PT GP 2                    Tierra Lubin PT  3/1/2017

## 2017-03-07 ENCOUNTER — HOSPITAL ENCOUNTER (OUTPATIENT)
Dept: PHYSICAL THERAPY | Facility: HOSPITAL | Age: 27
Setting detail: THERAPIES SERIES
Discharge: HOME | End: 2017-03-07

## 2017-03-07 DIAGNOSIS — M54.2 NECK PAIN: Primary | ICD-10-CM

## 2017-03-07 PROCEDURE — 97140 MANUAL THERAPY 1/> REGIONS: CPT | Performed by: PHYSICAL THERAPIST

## 2017-03-07 PROCEDURE — 97110 THERAPEUTIC EXERCISES: CPT | Performed by: PHYSICAL THERAPIST

## 2017-03-07 NOTE — PROGRESS NOTES
Outpatient Physical Therapy Ortho Treatment Note  [facility]     Patient Name: Elisa Barrett  : 1990  MRN: [id number]  Today's Date: 3/7/2017      Visit Date: 2017    Visit Dx:    ICD-10-CM ICD-9-CM   1. Neck pain M54.2 723.1       There is no problem list on file for this patient.       Past Medical History   Diagnosis Date   • Attention deficit disorder    • Neck pain         No past surgical history on file.                          PT Assessment/Plan       17 1421       PT Assessment    Assessment Comments Pt was 6 minutes late to appointment. Her 1st rib was only slightly elevated today, but did report pain. Her L cervical rotation is limtied, but overall improving. We will look towards discharge soon. She reports working on posture at home, but struggles at times.   -KR     PT Plan    PT Plan Comments Make sure HEP is bolstered and progress with discharge as appropirate.   -KR       User Key  (r) = Recorded By, (t) = Taken By, (c) = Cosigned By    Initials Name Provider Type    KAIN Tillman, PT DPT Physical Therapist                    Exercises       17 1421          Subjective Comments    Subjective Comments She reports her belly button ring is hurting again. She reports doing well, still having to work on her posture.   -KR      Subjective Pain    Able to rate subjective pain? yes  -KR      Pre-Treatment Pain Level 0  -KR      Post-Treatment Pain Level 0  -KR      Exercise 1    Exercise Name 1 1/2 foam horx abd with yellow TB   -KR      Sets 1 2  -KR      Reps 1 10  -KR      Exercise 2    Exercise Name 2 1/2 foam diagonals with yellow TB   -KR      Sets 2 2  -KR      Reps 2 10  -KR      Exercise 3    Exercise Name 3 sitting on SB Rows with green TB   -KR      Sets 3 3  -KR      Reps 3 10  -KR      Exercise 4    Exercise Name 4 UE phasic with red TB sitting on SB   -KR      Sets 4 3  -KR      Reps 4 10  -KR        User Key  (r) = Recorded By, (t) = Taken By, (c) =  Cosigned By    Initials Name Provider Type    KAIN Tillman, PT DPT Physical Therapist                        Manual Rx (last 36 hours)      Manual Treatments       03/07/17 1421          Manual Rx 1    Manual Rx 1 Location hooklying 1st rib depression L   -KR      Manual Rx 1 Grade 2/3  -KR      Manual Rx 1 Duration 5  -KR      Manual Rx 2    Manual Rx 2 Location STM UT/LS L   -KR      Manual Rx 2 Grade mod  -KR      Manual Rx 2 Duration 5  -KR        User Key  (r) = Recorded By, (t) = Taken By, (c) = Cosigned By    Initials Name Provider Type    KAIN Tillman, PT DPT Physical Therapist                PT OP Goals       03/07/17 1421       PT Short Term Goals    STG Date to Achieve 02/28/17  -KR     STG 1 Pt will report pain no greater than 3/10 throughout the day.   -KR     STG 1 Progress Met  -KR     STG 2 Pt will be I with initial HEP.   -KR     STG 2 Progress Met  -KR     Long Term Goals    LTG Date to Achieve 03/16/17  -KR     LTG 1 Pt will be I with HEP to improve posture and decrease strain on muscles and spine   -KR     LTG 1 Progress Ongoing  -KR     LTG 2 Pt will have 65 degrees or greater cervical rotation in order to improve looking over shoulder.   -KR     LTG 2 Progress Ongoing;Partially Met  -KR     LTG 2 Progress Comments L 65 R 80  -KR     LTG 3 Pt will have 25 degress or greater cervical lateral sidebending.   -KR     LTG 3 Progress Met  -KR     LTG 4 Pt will report no interuptions in sleep at night because of neck pain.   -KR     LTG 4 Progress Met  -KR     Time Calculation    PT Goal Re-Cert Due Date 03/16/17  -KR       User Key  (r) = Recorded By, (t) = Taken By, (c) = Cosigned By    Initials Name Provider Type    KAIN Tillman, PT DPT Physical Therapist                Therapy Education       03/07/17 1421          Therapy Education    Given HEP;Posture/body mechanics  -KR      Program Reinforced  -KAIN      How Provided Verbal;Demonstration  -KR      Provided to Patient   -KAIN      Level of Understanding Verbalized  -KR        User Key  (r) = Recorded By, (t) = Taken By, (c) = Cosigned By    Initials Name Provider Type    KAIN Tillman, PT DPT Physical Therapist                Time Calculation:   Start Time: 1421  Stop Time: 1500  Time Calculation (min): 39 min  Total Timed Code Minutes- PT: 39 minute(s)    Therapy Charges for Today     Code Description Service Date Service Provider Modifiers Qty    15682634950  PT THER PROC EA 15 MIN 3/7/2017 Angela Tillman, PT DPT GP 2    [id number]  PT MANUAL THERAPY EA 15 MIN 3/7/2017 Angela Tillman, PT DPT GP 1                    Angela Tillman, PT DPT  3/7/2017

## 2017-03-14 ENCOUNTER — HOSPITAL ENCOUNTER (OUTPATIENT)
Dept: PHYSICAL THERAPY | Facility: HOSPITAL | Age: 27
Setting detail: THERAPIES SERIES
Discharge: HOME | End: 2017-03-14

## 2017-03-14 DIAGNOSIS — M54.2 NECK PAIN: Primary | ICD-10-CM

## 2017-03-14 PROCEDURE — G8986 CARRY D/C STATUS: HCPCS | Performed by: PHYSICAL THERAPIST

## 2017-03-14 PROCEDURE — 97110 THERAPEUTIC EXERCISES: CPT | Performed by: PHYSICAL THERAPIST

## 2017-03-14 PROCEDURE — G8991 OTHER PT/OT GOAL STATUS: HCPCS | Performed by: PHYSICAL THERAPIST

## 2017-03-14 PROCEDURE — G8982 BODY POS GOAL STATUS: HCPCS | Performed by: PHYSICAL THERAPIST

## 2017-03-14 PROCEDURE — G8983 BODY POS D/C STATUS: HCPCS | Performed by: PHYSICAL THERAPIST

## 2017-03-14 NOTE — THERAPY DISCHARGE NOTE
Outpatient Physical Therapy Ortho Treatment Note/Discharge Summary  [facility]     Patient Name: Elisa Barrett  : 1990  MRN: [id number]  Today's Date: 3/14/2017      Visit Date: 2017    Visit Dx:    ICD-10-CM ICD-9-CM   1. Neck pain M54.2 723.1       There is no problem list on file for this patient.       Past Medical History   Diagnosis Date   • Attention deficit disorder    • Neck pain         No past surgical history on file.                          PT Assessment/Plan       17 1420       PT Assessment    Assessment Comments Pt was 5 minutes late again today. She has met all gols at this point. She is reporting intermittent pain, but reports will be able to complete her home program on her own. She will need to continue to work on her posture nd has an updated written program. We were slightly limited and had to adapt with positioning secondary to patient requesting not to lay on stomach most visits.   -KR     PT Plan    PT Plan Comments Discharge for now, she is to call with any questions or concerns.   -KR       User Key  (r) = Recorded By, (t) = Taken By, (c) = Cosigned By    Initials Name Provider Type    KAIN Tillman, PT DPT Physical Therapist                    Exercises       17 1420          Subjective Comments    Subjective Comments She report her neck and back have been hurting. She reports gave someone a hug and her neck and back hurt since. No pain right now. She repots thinking she can continue to work at home itha home program. She reports she still can't lay on her stomach because of her belly button.   -KR      Subjective Pain    Able to rate subjective pain? yes  -KR      Pre-Treatment Pain Level 0  -KR      Post-Treatment Pain Level 0  -KR      Exercise 1    Exercise Name 1 discussed home program and went through printout; gave her 3 copies to have at multiple places secondary to her staying multiple homes during the week.   -KR      Exercise 2     Exercise Name 2 thoracic extension on towel roll   -KR      Time (Minutes) 2 5  -KR      Exercise 3    Exercise Name 3 UE phasic with and without yellow TB   -KR      Sets 3 2  -KR      Reps 3 10   each  -KR      Exercise 4    Exercise Name 4 Diagonals standing at wall with yellow TB   -KR      Sets 4 3  -KR      Reps 4 10  -KR      Exercise 5    Exercise Name 5 standing against wall I's  -KR      Sets 5 2  -KR      Reps 5 10  -KR      Exercise 6    Exercise Name 6 standing against wall hands on head scapular retraction  -KR      Sets 6 2  -KR      Reps 6 10  -KR        User Key  (r) = Recorded By, (t) = Taken By, (c) = Cosigned By    Initials Name Provider Type    KAIN Tillman, PT DPT Physical Therapist                               PT OP Goals       03/14/17 1420       PT Short Term Goals    STG Date to Achieve 02/28/17  -KR     STG 1 Pt will report pain no greater than 3/10 throughout the day.   -KR     STG 1 Progress Met  -KR     STG 2 Pt will be I with initial HEP.   -KR     STG 2 Progress Met  -KR     Long Term Goals    LTG Date to Achieve 03/16/17  -KR     LTG 1 Pt will be I with HEP to improve posture and decrease strain on muscles and spine   -KR     LTG 1 Progress Met  -KR     LTG 1 Progress Comments updated today..   -KR     LTG 2 Pt will have 65 degrees or greater cervical rotation in order to improve looking over shoulder.   -KR     LTG 2 Progress Met  -KR     LTG 2 Progress Comments 70 L 75 R  -KR     LTG 3 Pt will have 25 degress or greater cervical lateral sidebending.   -KR     LTG 3 Progress Met  -KR     LTG 4 Pt will report no interuptions in sleep at night because of neck pain.   -KR     LTG 4 Progress Met  -KR     Time Calculation    PT Goal Re-Cert Due Date 03/16/17  -KR       User Key  (r) = Recorded By, (t) = Taken By, (c) = Cosigned By    Initials Name Provider Type    KAIN Tillman, PT DPT Physical Therapist                Therapy Education       03/14/17 2102           Therapy Education    Given HEP;Posture/body mechanics  -KR      Program Progressed  -KR      How Provided Verbal;Demonstration;Written  -KR      Provided to Patient  -KR      Level of Understanding Verbalized;Demonstrated  -KR        User Key  (r) = Recorded By, (t) = Taken By, (c) = Cosigned By    Initials Name Provider Type    KAIN Tillman, PT DPT Physical Therapist                Time Calculation:   Start Time: 1420  Stop Time: 1500  Time Calculation (min): 40 min  Total Timed Code Minutes- PT: 40 minute(s)    Therapy Charges for Today     Code Description Service Date Service Provider Modifiers Qty    50209035149  PT THER PROC EA 15 MIN 3/14/2017 Angela Tillman, PT DPT GP 3                OP PT Discharge Summary  Date of Discharge: 03/14/17  Reason for Discharge: All goals achieved  Outcomes Achieved: Able to achieve all goals within established timeline  Discharge Destination: Home with home program      Angela Tillman, PT DPT  3/14/2017

## 2017-06-23 ENCOUNTER — OFFICE VISIT (OUTPATIENT)
Dept: URGENT CARE | Age: 27
End: 2017-06-23
Payer: MEDICARE

## 2017-06-23 VITALS
OXYGEN SATURATION: 96 % | HEART RATE: 70 BPM | SYSTOLIC BLOOD PRESSURE: 124 MMHG | RESPIRATION RATE: 20 BRPM | TEMPERATURE: 98.1 F | WEIGHT: 202 LBS | HEIGHT: 66 IN | BODY MASS INDEX: 32.47 KG/M2 | DIASTOLIC BLOOD PRESSURE: 74 MMHG

## 2017-06-23 DIAGNOSIS — J02.9 SORE THROAT: Primary | ICD-10-CM

## 2017-06-23 DIAGNOSIS — J01.00 ACUTE NON-RECURRENT MAXILLARY SINUSITIS: ICD-10-CM

## 2017-06-23 LAB — S PYO AG THROAT QL: NORMAL

## 2017-06-23 PROCEDURE — G8417 CALC BMI ABV UP PARAM F/U: HCPCS | Performed by: PHYSICIAN ASSISTANT

## 2017-06-23 PROCEDURE — 87880 STREP A ASSAY W/OPTIC: CPT | Performed by: PHYSICIAN ASSISTANT

## 2017-06-23 PROCEDURE — 1036F TOBACCO NON-USER: CPT | Performed by: PHYSICIAN ASSISTANT

## 2017-06-23 PROCEDURE — 99202 OFFICE O/P NEW SF 15 MIN: CPT | Performed by: PHYSICIAN ASSISTANT

## 2017-06-23 PROCEDURE — G8427 DOCREV CUR MEDS BY ELIG CLIN: HCPCS | Performed by: PHYSICIAN ASSISTANT

## 2017-06-23 RX ORDER — CLONIDINE HYDROCHLORIDE 0.1 MG/1
TABLET ORAL
COMMUNITY
Start: 2017-06-19

## 2017-06-23 RX ORDER — ARIPIPRAZOLE 15 MG/1
TABLET ORAL
COMMUNITY
Start: 2017-06-19

## 2017-06-23 RX ORDER — AMOXICILLIN 500 MG/1
500 TABLET, FILM COATED ORAL 2 TIMES DAILY
Qty: 20 TABLET | Refills: 0 | Status: SHIPPED | OUTPATIENT
Start: 2017-06-23

## 2017-06-23 RX ORDER — PIMOZIDE 1 MG/1
TABLET ORAL
COMMUNITY
Start: 2017-06-19

## 2017-06-23 RX ORDER — FLUTICASONE PROPIONATE 50 MCG
2 SPRAY, SUSPENSION (ML) NASAL DAILY
Qty: 1 BOTTLE | Refills: 0 | Status: SHIPPED | OUTPATIENT
Start: 2017-06-23

## 2017-06-23 ASSESSMENT — ENCOUNTER SYMPTOMS
SORE THROAT: 1
VOMITING: 0
ABDOMINAL PAIN: 0
COUGH: 1
NAUSEA: 0
RHINORRHEA: 1
DIARRHEA: 0
SINUS PRESSURE: 1

## 2017-07-25 ENCOUNTER — APPOINTMENT (OUTPATIENT)
Dept: GENERAL RADIOLOGY | Age: 27
End: 2017-07-25
Payer: OTHER MISCELLANEOUS

## 2017-07-25 ENCOUNTER — HOSPITAL ENCOUNTER (EMERGENCY)
Age: 27
Discharge: HOME | End: 2017-07-25
Payer: OTHER MISCELLANEOUS

## 2017-07-25 VITALS
DIASTOLIC BLOOD PRESSURE: 81 MMHG | HEART RATE: 56 BPM | BODY MASS INDEX: 32.95 KG/M2 | OXYGEN SATURATION: 97 % | RESPIRATION RATE: 17 BRPM | WEIGHT: 205 LBS | SYSTOLIC BLOOD PRESSURE: 126 MMHG | TEMPERATURE: 98 F | HEIGHT: 66 IN

## 2017-07-25 DIAGNOSIS — S16.1XXA NECK STRAIN, INITIAL ENCOUNTER: Primary | ICD-10-CM

## 2017-07-25 PROCEDURE — 99282 EMERGENCY DEPT VISIT SF MDM: CPT | Performed by: NURSE PRACTITIONER

## 2017-07-25 PROCEDURE — 72100 X-RAY EXAM L-S SPINE 2/3 VWS: CPT

## 2017-07-25 PROCEDURE — 6360000002 HC RX W HCPCS: Performed by: NURSE PRACTITIONER

## 2017-07-25 PROCEDURE — 99283 EMERGENCY DEPT VISIT LOW MDM: CPT

## 2017-07-25 PROCEDURE — 72040 X-RAY EXAM NECK SPINE 2-3 VW: CPT

## 2017-07-25 PROCEDURE — 96372 THER/PROPH/DIAG INJ SC/IM: CPT

## 2017-07-25 RX ORDER — KETOROLAC TROMETHAMINE 30 MG/ML
30 INJECTION, SOLUTION INTRAMUSCULAR; INTRAVENOUS ONCE
Status: COMPLETED | OUTPATIENT
Start: 2017-07-25 | End: 2017-07-25

## 2017-07-25 RX ORDER — ORPHENADRINE CITRATE 30 MG/ML
60 INJECTION INTRAMUSCULAR; INTRAVENOUS ONCE
Status: COMPLETED | OUTPATIENT
Start: 2017-07-25 | End: 2017-07-25

## 2017-07-25 RX ORDER — NAPROXEN 500 MG/1
500 TABLET ORAL 2 TIMES DAILY
Qty: 20 TABLET | Refills: 0 | Status: SHIPPED | OUTPATIENT
Start: 2017-07-25 | End: 2017-08-04

## 2017-07-25 RX ORDER — CYCLOBENZAPRINE HCL 10 MG
10 TABLET ORAL 3 TIMES DAILY PRN
Qty: 20 TABLET | Refills: 0 | Status: SHIPPED | OUTPATIENT
Start: 2017-07-25 | End: 2017-08-04

## 2017-07-25 RX ADMIN — KETOROLAC TROMETHAMINE 30 MG: 30 INJECTION, SOLUTION INTRAMUSCULAR at 16:22

## 2017-07-25 RX ADMIN — ORPHENADRINE CITRATE 60 MG: 30 INJECTION INTRAMUSCULAR; INTRAVENOUS at 16:22

## 2017-07-25 ASSESSMENT — PAIN SCALES - GENERAL
PAINLEVEL_OUTOF10: 10
PAINLEVEL_OUTOF10: 10

## 2017-07-25 ASSESSMENT — PAIN DESCRIPTION - LOCATION: LOCATION: NECK;BACK

## 2017-07-25 ASSESSMENT — ENCOUNTER SYMPTOMS: RESPIRATORY NEGATIVE: 1

## 2017-11-18 ENCOUNTER — OFFICE VISIT (OUTPATIENT)
Dept: URGENT CARE | Age: 27
End: 2017-11-18
Payer: MEDICARE

## 2017-11-18 VITALS
WEIGHT: 198 LBS | HEIGHT: 65 IN | SYSTOLIC BLOOD PRESSURE: 120 MMHG | HEART RATE: 65 BPM | DIASTOLIC BLOOD PRESSURE: 86 MMHG | TEMPERATURE: 97.7 F | BODY MASS INDEX: 32.99 KG/M2 | OXYGEN SATURATION: 98 % | RESPIRATION RATE: 18 BRPM

## 2017-11-18 DIAGNOSIS — J02.9 SORE THROAT: ICD-10-CM

## 2017-11-18 DIAGNOSIS — J01.00 ACUTE NON-RECURRENT MAXILLARY SINUSITIS: Primary | ICD-10-CM

## 2017-11-18 LAB — S PYO AG THROAT QL: NORMAL

## 2017-11-18 PROCEDURE — 87880 STREP A ASSAY W/OPTIC: CPT | Performed by: FAMILY MEDICINE

## 2017-11-18 PROCEDURE — G8417 CALC BMI ABV UP PARAM F/U: HCPCS | Performed by: FAMILY MEDICINE

## 2017-11-18 PROCEDURE — G8484 FLU IMMUNIZE NO ADMIN: HCPCS | Performed by: FAMILY MEDICINE

## 2017-11-18 PROCEDURE — 99213 OFFICE O/P EST LOW 20 MIN: CPT | Performed by: FAMILY MEDICINE

## 2017-11-18 PROCEDURE — G8427 DOCREV CUR MEDS BY ELIG CLIN: HCPCS | Performed by: FAMILY MEDICINE

## 2017-11-18 PROCEDURE — 1036F TOBACCO NON-USER: CPT | Performed by: FAMILY MEDICINE

## 2017-11-18 RX ORDER — CEFDINIR 300 MG/1
300 CAPSULE ORAL 2 TIMES DAILY
Qty: 20 CAPSULE | Refills: 0 | Status: SHIPPED | OUTPATIENT
Start: 2017-11-18 | End: 2017-11-28

## 2017-11-18 ASSESSMENT — ENCOUNTER SYMPTOMS
SORE THROAT: 1
SINUS PRESSURE: 1
COUGH: 1
SWOLLEN GLANDS: 1
SINUS COMPLAINT: 1

## 2017-11-18 NOTE — PROGRESS NOTES
Positive for cough. Neurological: Positive for headaches. See HPI for visit specific review of symptoms. All others negative      Objective:   /86 (Site: Right Arm, Position: Sitting, Cuff Size: Small Adult)   Pulse 65   Temp 97.7 °F (36.5 °C) (Oral)   Resp 18   Ht 5' 5\" (1.651 m)   Wt 198 lb (89.8 kg)   LMP 11/11/2017   SpO2 98%   BMI 32.95 kg/m²   Physical Exam   Constitutional: She appears well-developed. She does not appear ill. HENT:   Nose: Right sinus exhibits maxillary sinus tenderness. Left sinus exhibits maxillary sinus tenderness. Eyes: Pupils are equal, round, and reactive to light. Neck: Normal range of motion. Neck supple. Cardiovascular: Normal rate and regular rhythm. Exam reveals no friction rub. No murmur heard. Pulmonary/Chest: Effort normal and breath sounds normal. No respiratory distress. She has no wheezes. She has no rales. Abdominal: Soft. Bowel sounds are normal. She exhibits no distension. There is no tenderness. There is no rebound and no guarding. Musculoskeletal: She exhibits no edema. Neurological: She is alert. Psychiatric: She has a normal mood and affect. Her behavior is normal.         Recent Results (from the past 672 hour(s))   POCT rapid strep A    Collection Time: 11/18/17  3:14 PM   Result Value Ref Range    Strep A Ag None Detected None Detected               Assessment & Plan: The following diagnoses and conditions are stable with no further orders unless indicated:  1. Acute non-recurrent maxillary sinusitis  The following may help for colds and allergies:    Antihistamines: Help nasal drainage, watery eyes, sneezing, sore throat.  Benadryl (diphenhydramine) 25mg every 6 hours as needed    Zyrtec (cetirizine) 10mg once a day   Allegra (fexofenadine) 180mg once a day   Claritin or Alavert (loratadine) 10mg once a day     Decongestants: Help ear pressure, sinus pressure, and nasal congestion.     Sudafed

## 2018-02-26 ENCOUNTER — APPOINTMENT (OUTPATIENT)
Dept: CT IMAGING | Facility: HOSPITAL | Age: 28
End: 2018-02-26

## 2018-02-26 ENCOUNTER — APPOINTMENT (OUTPATIENT)
Dept: GENERAL RADIOLOGY | Facility: HOSPITAL | Age: 28
End: 2018-02-26

## 2018-02-26 ENCOUNTER — HOSPITAL ENCOUNTER (EMERGENCY)
Facility: HOSPITAL | Age: 28
Discharge: HOME | End: 2018-02-26
Admitting: EMERGENCY MEDICINE

## 2018-02-26 VITALS
RESPIRATION RATE: 16 BRPM | WEIGHT: 205 LBS | DIASTOLIC BLOOD PRESSURE: 81 MMHG | BODY MASS INDEX: 34.16 KG/M2 | SYSTOLIC BLOOD PRESSURE: 144 MMHG | OXYGEN SATURATION: 96 % | HEIGHT: 65 IN | HEART RATE: 80 BPM | TEMPERATURE: 97.7 F

## 2018-02-26 DIAGNOSIS — S16.1XXA STRAIN OF NECK MUSCLE, INITIAL ENCOUNTER: ICD-10-CM

## 2018-02-26 DIAGNOSIS — S80.01XA CONTUSION OF RIGHT KNEE, INITIAL ENCOUNTER: ICD-10-CM

## 2018-02-26 DIAGNOSIS — S00.03XA CONTUSION OF SCALP, INITIAL ENCOUNTER: ICD-10-CM
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
## 2018-02-26 LAB
B-HCG UR QL: NEGATIVE
INTERNAL NEGATIVE CONTROL: NEGATIVE
INTERNAL POSITIVE CONTROL: POSITIVE
Lab: NORMAL

## 2018-02-26 PROCEDURE — 70450 CT HEAD/BRAIN W/O DYE: CPT

## 2018-02-26 PROCEDURE — 72125 CT NECK SPINE W/O DYE: CPT

## 2018-02-26 PROCEDURE — 73562 X-RAY EXAM OF KNEE 3: CPT

## 2018-02-26 PROCEDURE — 99283 EMERGENCY DEPT VISIT LOW MDM: CPT

## 2018-02-26 RX ORDER — CYCLOBENZAPRINE HCL 10 MG
10 TABLET ORAL 3 TIMES DAILY PRN
Qty: 20 TABLET | Refills: 0 | Status: SHIPPED | OUTPATIENT
Start: 2018-02-26 | End: 2018-12-01

## 2018-03-10 ENCOUNTER — TELEPHONE (OUTPATIENT)
Dept: URGENT CARE | Age: 28
End: 2018-03-10

## 2018-04-11 ENCOUNTER — TRANSCRIBE ORDERS (OUTPATIENT)
Dept: PHYSICAL THERAPY | Facility: HOSPITAL | Age: 28
End: 2018-04-11

## 2018-04-11 DIAGNOSIS — M54.2 CERVICALGIA: Primary | ICD-10-CM

## 2018-04-11 DIAGNOSIS — M25.569 KNEE PAIN, UNSPECIFIED CHRONICITY, UNSPECIFIED LATERALITY: ICD-10-CM

## 2018-04-27 ENCOUNTER — HOSPITAL ENCOUNTER (OUTPATIENT)
Dept: PHYSICAL THERAPY | Facility: HOSPITAL | Age: 28
Setting detail: THERAPIES SERIES
Discharge: HOME | End: 2018-04-27

## 2018-04-27 DIAGNOSIS — M25.561 CHRONIC PAIN OF BOTH KNEES: ICD-10-CM

## 2018-04-27 DIAGNOSIS — G89.29 CHRONIC PAIN OF BOTH KNEES: ICD-10-CM

## 2018-04-27 DIAGNOSIS — M25.562 CHRONIC PAIN OF BOTH KNEES: ICD-10-CM

## 2018-04-27 DIAGNOSIS — M54.2 NECK PAIN: Primary | ICD-10-CM

## 2018-04-27 PROCEDURE — G8978 MOBILITY CURRENT STATUS: HCPCS | Performed by: PHYSICAL THERAPIST

## 2018-04-27 PROCEDURE — G8979 MOBILITY GOAL STATUS: HCPCS | Performed by: PHYSICAL THERAPIST

## 2018-04-27 PROCEDURE — 97161 PT EVAL LOW COMPLEX 20 MIN: CPT | Performed by: PHYSICAL THERAPIST
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
## 2018-05-11 ENCOUNTER — HOSPITAL ENCOUNTER (OUTPATIENT)
Dept: PHYSICAL THERAPY | Facility: HOSPITAL | Age: 28
Setting detail: THERAPIES SERIES
Discharge: HOME | End: 2018-05-11

## 2018-05-11 DIAGNOSIS — M25.562 CHRONIC PAIN OF BOTH KNEES: ICD-10-CM

## 2018-05-11 DIAGNOSIS — G89.29 CHRONIC PAIN OF BOTH KNEES: ICD-10-CM

## 2018-05-11 DIAGNOSIS — M54.2 NECK PAIN: Primary | ICD-10-CM

## 2018-05-11 DIAGNOSIS — M25.561 CHRONIC PAIN OF BOTH KNEES: ICD-10-CM

## 2018-05-11 PROCEDURE — 97140 MANUAL THERAPY 1/> REGIONS: CPT | Performed by: PHYSICAL THERAPIST

## 2018-05-11 PROCEDURE — 97110 THERAPEUTIC EXERCISES: CPT | Performed by: PHYSICAL THERAPIST

## 2018-05-11 NOTE — THERAPY TREATMENT NOTE
Outpatient Physical Therapy Ortho Treatment Note  [facility]     Patient Name: Elisa Barrett  : 1990  MRN: [id number]  Today's Date: 2018      Visit Date: 2018    Visit Dx:    ICD-10-CM ICD-9-CM   1. Neck pain M54.2 723.1   2. Chronic pain of both knees M25.561 719.46    M25.562 338.29    G89.29        There is no problem list on file for this patient.       Past Medical History:   Diagnosis Date   • Attention deficit disorder    • Back pain    • Neck pain         No past surgical history on file.                          PT Assessment/Plan     Row Name 18          PT Assessment    Assessment Comments No goals met at this point, this is her treatment session since her initial evaluation. She continues to need multiple cues for her posture. I discussed her POC being 2 times/week and had to explain what this meant. She was open to scheduling another appointment each week. We progress with postural and core activation today and will continue to work on these.   -KR        PT Plan    PT Plan Comments continue to work thoracic mobility and posture. using modalities as needed for pain relief.   -KR       User Key  (r) = Recorded By, (t) = Taken By, (c) = Cosigned By    Initials Name Provider Type    KAIN Tillman, PT DPT Physical Therapist                    Exercises     Row Name 18             Subjective Comments    Subjective Comments She reports the tape didn't stay on very long, abut 20 minutes. She reports her knees are still bruised.   -KR         Subjective Pain    Able to rate subjective pain? yes  -KR      Pre-Treatment Pain Level 3   knees, mid back/shoulders  -KR      Post-Treatment Pain Level 3  -KR         Exercise 1    Exercise Name 1 talked to her about scheduling twicec a week, she only scheduled once a week.  -KR      Cueing 1 Verbal  -KR         Exercise 2    Exercise Name 2 she reports laying on her stomach hurts her where she got her belly button  pierced 2 months ago, I put a pillow under her belly  -KR      Cueing 2 Verbal  -KR         Exercise 3    Exercise Name 3 hooklying thoracic extension   -KR      Cueing 3 Verbal  -KR      Time 3 5 min  -KR         Exercise 4    Exercise Name 4 hooklying mini bridge  -KR      Cueing 4 Verbal  -KR      Sets 4 3  -KR      Reps 4 10  -KR         Exercise 5    Exercise Name 5 hooklying clams with red TB  -KR      Cueing 5 Verbal  -KR      Sets 5 3  -KR      Reps 5 10  -KR         Exercise 6    Exercise Name 6 hooklying horz abduction with yellowTB  -KR      Cueing 6 Verbal  -KR      Sets 6 3  -KR      Reps 6 10  -KR         Exercise 7    Exercise Name 7 hooklying marches with TA  -KR      Cueing 7 Verbal  -KR      Sets 7 2  -KR      Time 7 1 min  -KR      Additional Comments reports fatigue on 2nd set and only made it 45 seconds  -KR        User Key  (r) = Recorded By, (t) = Taken By, (c) = Cosigned By    Initials Name Provider Type    KAIN Tillman, PT DPT Physical Therapist                        Manual Rx (last 36 hours)      Manual Treatments     Row Name 05/11/18 0824             Manual Rx 1    Manual Rx 1 Location prone thoracic   -KR      Manual Rx 1 Grade 1/2   unable to progress secondary to patient tolerance  -KR      Manual Rx 1 Duration 7  -KR         Manual Rx 2    Manual Rx 2 Location prone thoracic STM to blueu rachet roller  -KR      Manual Rx 2 Grade min-mod P  -KR      Manual Rx 2 Duration 7  -KR        User Key  (r) = Recorded By, (t) = Taken By, (c) = Cosigned By    Initials Name Provider Type    KAIN Tillman, PT DPT Physical Therapist                PT OP Goals     Row Name 05/11/18 0824          PT Short Term Goals    STG Date to Achieve 05/11/18  -KR     STG 1 Improve facet closing mobility on right lower neck  -KR     STG 1 Progress Ongoing  -KR     STG 2 Improve thoracic extension for improved posture  -KR     STG 2 Progress Ongoing  -KR     STG 2 Progress Comments had to cue  for improved posture  -KR        Long Term Goals    LTG Date to Achieve 05/25/18  -KR     LTG 1 Pt will be I with HEP to improve posture and decrease strain on muscles and spine   -KR     LTG 1 Progress Ongoing  -KR     LTG 2 Improve cervical extension to 50+% and full cervical ning rotation  -KR     LTG 2 Progress Ongoing  -KR     LTG 3 Able to do a full squat and return to stand without severe exacerbation of pain  -KR     LTG 3 Progress Ongoing  -KR     LTG 4 Able to ascend/descend stairs with no pain or hesitation  -KR     LTG 4 Progress Ongoing  -KR        Time Calculation    PT Goal Re-Cert Due Date 05/27/18  -KR       User Key  (r) = Recorded By, (t) = Taken By, (c) = Cosigned By    Initials Name Provider Type    KAIN Tillman, PT DPT Physical Therapist          Therapy Education  Given: Symptoms/condition management, Posture/body mechanics  Program: Reinforced  How Provided: Verbal  Provided to: Patient  Level of Understanding: Verbalized              Time Calculation:   Start Time: 0824  Stop Time: 0912  Time Calculation (min): 48 min  Total Timed Code Minutes- PT: 48 minute(s)    Therapy Charges for Today     Code Description Service Date Service Provider Modifiers Qty    33483120859 HC PT MANUAL THERAPY EA 15 MIN 5/11/2018 Angela Tillman, PT DPT GP 1    65731517416 HC PT THER PROC EA 15 MIN 5/11/2018 Angela Tillman, PT DPT GP 2                    Angela Tillman, PT DPT  5/11/2018

## 2018-05-16 ENCOUNTER — HOSPITAL ENCOUNTER (OUTPATIENT)
Dept: ULTRASOUND IMAGING | Age: 28
Discharge: HOME | End: 2018-05-16
Payer: MEDICARE

## 2018-05-16 ENCOUNTER — HOSPITAL ENCOUNTER (OUTPATIENT)
Dept: NUCLEAR MEDICINE | Age: 28
End: 2018-05-16
Payer: MEDICARE

## 2018-05-16 DIAGNOSIS — R10.11 ABDOMINAL PAIN, RIGHT UPPER QUADRANT: ICD-10-CM

## 2018-05-16 PROCEDURE — 76705 ECHO EXAM OF ABDOMEN: CPT

## 2018-05-18 ENCOUNTER — HOSPITAL ENCOUNTER (OUTPATIENT)
Dept: PHYSICAL THERAPY | Facility: HOSPITAL | Age: 28
Setting detail: THERAPIES SERIES
Discharge: HOME | End: 2018-05-18

## 2018-05-18 DIAGNOSIS — M25.562 CHRONIC PAIN OF BOTH KNEES: ICD-10-CM

## 2018-05-18 DIAGNOSIS — M25.561 CHRONIC PAIN OF BOTH KNEES: ICD-10-CM

## 2018-05-18 DIAGNOSIS — M54.2 NECK PAIN: Primary | ICD-10-CM

## 2018-05-18 DIAGNOSIS — G89.29 CHRONIC PAIN OF BOTH KNEES: ICD-10-CM

## 2018-05-18 PROCEDURE — 97110 THERAPEUTIC EXERCISES: CPT

## 2018-05-18 PROCEDURE — 97140 MANUAL THERAPY 1/> REGIONS: CPT

## 2018-05-18 NOTE — THERAPY TREATMENT NOTE
Outpatient Physical Therapy Ortho Treatment Note  [facility]     Patient Name: Elisa Barrett  : 1990  MRN: [id number]  Today's Date: 2018      Visit Date: 2018    Visit Dx:    ICD-10-CM ICD-9-CM   1. Neck pain M54.2 723.1   2. Chronic pain of both knees M25.561 719.46    M25.562 338.29    G89.29        There is no problem list on file for this patient.       Past Medical History:   Diagnosis Date   • Attention deficit disorder    • Back pain    • Neck pain         No past surgical history on file.                          PT Assessment/Plan     Row Name 18 4777          PT Assessment    Assessment Comments Pt reports increased fatigue today but no pain. Throughout treatment ot required increased cues for posture and proper form with exercises. Treatment focus on improving thoracic and cervical mobility as well hip and core strengthening and stability. Pt fatigued quickly with exercises and required several rest breaks. Pt required increased cues to remain on task.   -TR        PT Plan    PT Plan Comments Continue to work to improve thoracic and cervical mobility as well bolstering HEP.   -TR       User Key  (r) = Recorded By, (t) = Taken By, (c) = Cosigned By    Initials Name Provider Type    TR Chastity Jean Baptiste PTA Physical Therapy Assistant                    Exercises     Row Name 18 2825             Subjective Comments    Subjective Comments Pt reports that she has been driving alot today and feeling fatigued. Pt reports no pain anywhere today and reports that she feels like she is improving.   -TR         Subjective Pain    Able to rate subjective pain? yes  -TR      Pre-Treatment Pain Level 0  -TR         Exercise 1    Exercise Name 1 Hooklying clamshells with red T-band   -TR      Cueing 1 Verbal  -TR      Sets 1 2  -TR      Reps 1 15  -TR         Exercise 2    Exercise Name 2 Bridges  -TR      Cueing 2 Verbal  -TR      Sets 2 2  -TR      Reps 2 10  -TR          Exercise 3    Exercise Name 3 Wall ball mini squats   -TR      Cueing 3 Verbal  -TR      Sets 3 2  -TR      Reps 3 10  -TR        User Key  (r) = Recorded By, (t) = Taken By, (c) = Cosigned By    Initials Name Provider Type    MONTY Jean Baptiste PTA Physical Therapy Assistant                        Manual Rx (last 36 hours)      Manual Treatments     Row Name 05/18/18 1345             Manual Rx 1    Manual Rx 1 Location Thoracic   -TR      Manual Rx 1 Type STM with blue foam roller in massage chair   -TR      Manual Rx 1 Grade min  -TR      Manual Rx 1 Duration 10  -TR         Manual Rx 2    Manual Rx 2 Location Thoracic   -TR      Manual Rx 2 Type Extension mobilizations in massage chair   -TR      Manual Rx 2 Grade 1-2  -TR      Manual Rx 2 Duration 7  -TR         Manual Rx 3    Manual Rx 3 Location Cervical   -TR      Manual Rx 3 Type Traction and subocciptal release   -TR      Manual Rx 3 Grade min 1-2  -TR      Manual Rx 3 Duration 6  -TR        User Key  (r) = Recorded By, (t) = Taken By, (c) = Cosigned By    Initials Name Provider Type    MONTY Jean Baptiste PTA Physical Therapy Assistant                PT OP Goals     Row Name 05/18/18 1345          PT Short Term Goals    STG Date to Achieve 05/11/18  -TR     STG 1 Improve facet closing mobility on right lower neck  -TR     STG 1 Progress Ongoing  -TR     STG 2 Improve thoracic extension for improved posture  -TR     STG 2 Progress Ongoing  -TR        Long Term Goals    LTG Date to Achieve 05/25/18  -TR     LTG 1 Pt will be I with HEP to improve posture and decrease strain on muscles and spine   -TR     LTG 1 Progress Ongoing  -TR     LTG 2 Improve cervical extension to 50+% and full cervical ning rotation  -TR     LTG 2 Progress Ongoing  -TR     LTG 3 Able to do a full squat and return to stand without severe exacerbation of pain  -TR     LTG 3 Progress Ongoing  -TR     LTG 3 Progress Comments increased fatigue with wall ball squats.   -TR     LTG 4  Able to ascend/descend stairs with no pain or hesitation  -TR     LTG 4 Progress Ongoing  -TR        Time Calculation    PT Goal Re-Cert Due Date 05/27/18  -TR       User Key  (r) = Recorded By, (t) = Taken By, (c) = Cosigned By    Initials Name Provider Type    TR Chastity Jean Baptiste PTA Physical Therapy Assistant          Therapy Education  Given: Posture/body mechanics  Program: Reinforced  How Provided: Verbal  Provided to: Patient  Level of Understanding: Verbalized              Time Calculation:   Start Time: 1345  Stop Time: 1425  Time Calculation (min): 40 min  Total Timed Code Minutes- PT: 40 minute(s)    Therapy Charges for Today     Code Description Service Date Service Provider Modifiers Qty    44245297393 HC PT MANUAL THERAPY EA 15 MIN 5/18/2018 Chastity Jean Baptiste PTA GP 2    89340014916 HC PT THER PROC EA 15 MIN 5/18/2018 Chastity Jean Baptiste PTA GP 1                    Chastity Jean Baptiste PTA  5/18/2018

## 2018-05-22 ENCOUNTER — HOSPITAL ENCOUNTER (OUTPATIENT)
Dept: PHYSICAL THERAPY | Facility: HOSPITAL | Age: 28
Setting detail: THERAPIES SERIES
Discharge: HOME | End: 2018-05-22

## 2018-05-22 DIAGNOSIS — M25.562 CHRONIC PAIN OF BOTH KNEES: ICD-10-CM

## 2018-05-22 DIAGNOSIS — M54.2 NECK PAIN: Primary | ICD-10-CM

## 2018-05-22 DIAGNOSIS — M25.561 CHRONIC PAIN OF BOTH KNEES: ICD-10-CM

## 2018-05-22 DIAGNOSIS — G89.29 CHRONIC PAIN OF BOTH KNEES: ICD-10-CM

## 2018-05-22 PROCEDURE — 97110 THERAPEUTIC EXERCISES: CPT | Performed by: PHYSICAL THERAPIST

## 2018-05-22 PROCEDURE — 97140 MANUAL THERAPY 1/> REGIONS: CPT | Performed by: PHYSICAL THERAPIST

## 2018-05-22 NOTE — THERAPY TREATMENT NOTE
Outpatient Physical Therapy Ortho Treatment Note  [facility]     Patient Name: Elisa Barrett  : 1990  MRN: [id number]  Today's Date: 2018      Visit Date: 2018    Visit Dx:    ICD-10-CM ICD-9-CM   1. Neck pain M54.2 723.1   2. Chronic pain of both knees M25.561 719.46    M25.562 338.29    G89.29        There is no problem list on file for this patient.       Past Medical History:   Diagnosis Date   • Attention deficit disorder    • Back pain    • Neck pain         No past surgical history on file.                          PT Assessment/Plan     Row Name 18 4952          PT Assessment    Assessment Comments Her neck ROM is still limited bilaterally. She does report compliance with working on her posture. She does continue to demonstrate decreased core stability. She does not tolerate a great amount of manual therapy, so we may progress away from this and focus on therapeutic exercise.   -KR        PT Plan    PT Plan Comments Continue to work on improving cervicothoracic mobitliy. Progressing with postural and BLE strengthening. Progress her HEP next visit as tolerated  -KR       User Key  (r) = Recorded By, (t) = Taken By, (c) = Cosigned By    Initials Name Provider Type    KAIN Tillman, PT DPT Physical Therapist                    Exercises     Row Name 18 7528             Subjective Comments    Subjective Comments She reports working on her posture, but her middle back still hurts. She reports 7-8/10 pain in her knees. she reports having menstral cramps and her lower back is sore too.   -KR         Subjective Pain    Able to rate subjective pain? yes  -KR      Pre-Treatment Pain Level 3   mid back.   -KR      Post-Treatment Pain Level 0  -KR         Exercise 1    Exercise Name 1 sidelying clams  -KR      Cueing 1 Verbal  -KR      Sets 1 2  -KR      Reps 1 10  -KR         Exercise 2    Exercise Name 2 thoracic rotation sidelying (open books)  -KR      Cueing 2 Verbal   -KR      Sets 2 2  -KR      Reps 2 10  -KR         Exercise 3    Exercise Name 3 sitting on SB marches  -KR      Cueing 3 Verbal  -KR      Sets 3 2  -KR      Time 3 2 min  -KR      Additional Comments min A at times for balance.   -KR        User Key  (r) = Recorded By, (t) = Taken By, (c) = Cosigned By    Initials Name Provider Type    KAIN Tillman, PT DPT Physical Therapist                        Manual Rx (last 36 hours)      Manual Treatments     Row Name 05/22/18 1345             Manual Rx 1    Manual Rx 1 Location Thoracic   -KR      Manual Rx 1 Type STM to thoracic paraspinals in prone  -KR      Manual Rx 1 Grade min  -KR      Manual Rx 1 Duration 4  -KR         Manual Rx 2    Manual Rx 2 Location Thoracic   -KR      Manual Rx 2 Type Extension mobilizations in prone  -KR      Manual Rx 2 Grade 1-2  -KR      Manual Rx 2 Duration 4  -KR         Manual Rx 3    Manual Rx 3 Location --  -KR      Manual Rx 3 Type STM to cervical parasinals and UT/LS  -KR      Manual Rx 3 Grade min OP  -KR      Manual Rx 3 Duration 4  -KR        User Key  (r) = Recorded By, (t) = Taken By, (c) = Cosigned By    Initials Name Provider Type    KAIN Tillman, PT DPT Physical Therapist                PT OP Goals     Row Name 05/22/18 1345          PT Short Term Goals    STG Date to Achieve 05/11/18  -KR     STG 1 Improve facet closing mobility on right lower neck  -KR     STG 1 Progress Ongoing  -KR     STG 2 Improve thoracic extension for improved posture  -KR     STG 2 Progress Ongoing  -KR        Long Term Goals    LTG Date to Achieve 05/25/18  -KR     LTG 1 Pt will be I with HEP to improve posture and decrease strain on muscles and spine   -KR     LTG 1 Progress Ongoing  -KR     LTG 2 Improve cervical extension to 50+% and full cervical ning rotation  -KR     LTG 2 Progress Ongoing  -KR     LTG 2 Progress Comments R 50 L 48  -KR     LTG 3 Able to do a full squat and return to stand without severe exacerbation of pain   -KR     LTG 3 Progress Ongoing  -KR     LTG 4 Able to ascend/descend stairs with no pain or hesitation  -KR     LTG 4 Progress Ongoing  -KR        Time Calculation    PT Goal Re-Cert Due Date 05/27/18  -KR       User Key  (r) = Recorded By, (t) = Taken By, (c) = Cosigned By    Initials Name Provider Type    KAIN Tillman, PT DPT Physical Therapist          Therapy Education  Given: Symptoms/condition management, Posture/body mechanics, HEP  Program: Reinforced  How Provided: Verbal  Provided to: Patient  Level of Understanding: Verbalized              Time Calculation:   Start Time: 1345  Stop Time: 1430  Time Calculation (min): 45 min  Total Timed Code Minutes- PT: 45 minute(s)    Therapy Charges for Today     Code Description Service Date Service Provider Modifiers Qty    67865844044 HC PT MANUAL THERAPY EA 15 MIN 5/22/2018 Angela Tillman, PT DPT GP 1    01828780235 HC PT THER PROC EA 15 MIN 5/22/2018 Angela Tillman, PT DPT GP 2                    Angela Tillman PT DPT  5/22/2018

## 2018-05-25 ENCOUNTER — HOSPITAL ENCOUNTER (OUTPATIENT)
Dept: PHYSICAL THERAPY | Facility: HOSPITAL | Age: 28
Setting detail: THERAPIES SERIES
Discharge: HOME | End: 2018-05-25

## 2018-05-25 DIAGNOSIS — M25.562 CHRONIC PAIN OF BOTH KNEES: ICD-10-CM

## 2018-05-25 DIAGNOSIS — M25.561 CHRONIC PAIN OF BOTH KNEES: ICD-10-CM

## 2018-05-25 DIAGNOSIS — G89.29 CHRONIC PAIN OF BOTH KNEES: ICD-10-CM

## 2018-05-25 DIAGNOSIS — M54.2 NECK PAIN: Primary | ICD-10-CM

## 2018-05-25 PROCEDURE — 97110 THERAPEUTIC EXERCISES: CPT

## 2018-05-25 NOTE — THERAPY PROGRESS REPORT/RE-CERT
Outpatient Physical Therapy Ortho Progress Note  [facility]     Patient Name: Elisa Barrett  : 1990  MRN: [id number]  Today's Date: 2018      Visit Date: 2018    Visit Dx:    ICD-10-CM ICD-9-CM   1. Neck pain M54.2 723.1   2. Chronic pain of both knees M25.561 719.46    M25.562 338.29    G89.29        There is no problem list on file for this patient.       Past Medical History:   Diagnosis Date   • Attention deficit disorder    • Back pain    • Neck pain         No past surgical history on file.                          PT Assessment/Plan     Row Name 18 1348          PT Assessment    Functional Limitations Limitations in community activities;Limitations in functional capacity and performance;Limitation in home management;Performance in self-care ADL  -TB     Impairments Range of motion;Pain;Joint mobility;Posture  -TB     Assessment Comments Pt has not met any goals at this time; however, is progressing towards most. Pt has not been able to tolerate manual therapy much due to difficulty with positioning and other various reasons. Pt eports 60% improvement since begining with therapy. Pt's cervical ROM is still limited with B cervical rotation as well as extension with increase in pain. Pt has poor mechanics with stair training and demonstrates weakness and decreased control with R quad. She still has pain in the R knee with squats. Pt does show slight improvement with B hip strengthening and given new HEP component to continue with this.   -TR     Rehab Potential Good  -TB     Patient/caregiver participated in establishment of treatment plan and goals Yes  -TB     Patient would benefit from skilled therapy intervention Yes  -TB        PT Plan    PT Frequency 2x/week  -TB     Predicted Duration of Therapy Intervention (OT Eval) 4 weeks  -TB     Planned CPT's? PT THER PROC EA 15 MIN: 22007;PT MANUAL THERAPY EA 15 MIN: 94813;PT GAIT TRAINING EA 15 MIN: 53140;PT ELECTRICAL STIM  UNATTEND: ;PT ELECTRICAL STIM ATTD EA 15 MIN: 38191  -TB     PT Plan Comments Continue to work on improving mobility as tolerated. Progress with postural as well as BLE strengthening. Continue to enforce postural education   -TR       User Key  (r) = Recorded By, (t) = Taken By, (c) = Cosigned By    Initials Name Provider Type    TB James Laguna, PT Physical Therapist    TR Chastity Jean Baptiste, MAINOR Physical Therapy Assistant                    Exercises     Row Name 05/25/18 5736             Subjective Comments    Subjective Comments Pt reports that she has been walking alot lately without any increase in pain.  Pt reports that her voice is hoarse.  -TR         Subjective Pain    Able to rate subjective pain? yes  -TR      Pre-Treatment Pain Level 0  -TR      Post-Treatment Pain Level 0  -TR         Exercise 1    Exercise Name 1 Attempted to have pt in massage chair; however she refused to do anything in the massage chair or in prone due to being hungry.   -TR         Exercise 2    Exercise Name 2 Hooklying on towel roll B  unilateral horizontal abduction with yellow band   -TR      Cueing 2 Verbal  -TR      Sets 2 2  -TR      Reps 2 10  -TR         Exercise 3    Exercise Name 3 Hooklying on towel roll BUE X pattern with yellow band  -TR      Cueing 3 Verbal  -TR      Sets 3 1  -TR      Reps 3 20  -TR         Exercise 4    Exercise Name 4 Bridges with iso ER with red band   -TR      Cueing 4 Verbal  -TR      Sets 4 2  -TR      Reps 4 10  -TR         Exercise 5    Exercise Name 5 Hooklying clams with red band   -TR      Cueing 5 Verbal  -TR      Sets 5 2  -TR      Reps 5 10  -TR      Additional Comments Added to HEP   -TR         Exercise 6    Exercise Name 6 Stair training   -TR      Cueing 6 Verbal  -TR      Time 6 7 steps, 1 handrail  -TR         Exercise 7    Exercise Name 7 Wall ball squats   -TR      Cueing 7 Verbal  -TR      Sets 7 1  -TR      Reps 7 10  -TR         Exercise 8    Exercise Name 8 Seated  on 75 cm SB scapular retraction with yellow band  -TR      Cueing 8 Verbal  -TR      Sets 8 1  -TR      Reps 8 20  -TR         Exercise 9    Exercise Name 9 Seated on 75cm SB marching   -TR      Cueing 9 Tactile  -TR      Sets 9 3  -TR      Time 9 1 min each   -TR         Exercise 10    Exercise Name 10 Addressed all goals for progress note   -TR        User Key  (r) = Recorded By, (t) = Taken By, (c) = Cosigned By    Initials Name Provider Type    TR Chastity Jean Baptiste, PTA Physical Therapy Assistant                               PT OP Goals     Row Name 05/25/18 1348          PT Short Term Goals    STG Date to Achieve 05/11/18  -TR     STG 1 Improve facet closing mobility on right lower neck  -TR     STG 1 Progress Ongoing  -TR     STG 1 Progress Comments Pt still stiff and not tolerating manual therapy well   -TR     STG 2 Improve thoracic extension for improved posture  -TR     STG 2 Progress Ongoing  -TR     STG 2 Progress Comments continues to require cues for posture   -TR        Long Term Goals    LTG Date to Achieve 05/25/18  -TR     LTG 1 Pt will be I with HEP to improve posture and decrease strain on muscles and spine   -TR     LTG 1 Progress Ongoing  -TR     LTG 1 Progress Comments complaint thus far  -TR     LTG 2 Improve cervical extension to 50+% and full cervical ning rotation  -TR     LTG 2 Progress Ongoing  -TR     LTG 2 Progress Comments 45 degrees cervical rotation bilaterally, cervical extension 25%, all with increased pain  -TR     LTG 3 Able to do a full squat and return to stand without severe exacerbation of pain  -TR     LTG 3 Progress Ongoing  -TR     LTG 3 Progress Comments increased pain   -TR     LTG 4 Able to ascend/descend stairs with no pain or hesitation  -TR     LTG 4 Progress Ongoing  -TR     LTG 4 Progress Comments hesitation with descending with LLE due to R knee pain, used handrail   -TR        Time Calculation    PT Goal Re-Cert Due Date 06/24/18  -TR       User Key  (r) =  Recorded By, (t) = Taken By, (c) = Cosigned By    Initials Name Provider Type    MONTY Jean Baptiste, MAINOR Physical Therapy Assistant          Therapy Education  Education Details: Hooklying clams with red band   Given: HEP  Program: New  How Provided: Verbal, Demonstration, Written  Provided to: Patient  Level of Understanding: Verbalized, Demonstrated          The plan of care and all goals were reviewed and updated as needed by James Laguna, PT 5/25/2018 4:33 PM      Time Calculation:   Start Time: 1348  Stop Time: 1430  Time Calculation (min): 42 min  Total Timed Code Minutes- PT: 42 minute(s)                  James Laguna, PT  5/25/2018

## 2018-06-01 ENCOUNTER — HOSPITAL ENCOUNTER (OUTPATIENT)
Dept: PHYSICAL THERAPY | Facility: HOSPITAL | Age: 28
Setting detail: THERAPIES SERIES
Discharge: HOME | End: 2018-06-01

## 2018-06-01 DIAGNOSIS — G89.29 CHRONIC PAIN OF BOTH KNEES: ICD-10-CM

## 2018-06-01 DIAGNOSIS — M54.2 NECK PAIN: Primary | ICD-10-CM

## 2018-06-01 DIAGNOSIS — M25.561 CHRONIC PAIN OF BOTH KNEES: ICD-10-CM

## 2018-06-01 DIAGNOSIS — M25.562 CHRONIC PAIN OF BOTH KNEES: ICD-10-CM

## 2018-06-01 PROCEDURE — 97110 THERAPEUTIC EXERCISES: CPT

## 2018-06-01 PROCEDURE — 97140 MANUAL THERAPY 1/> REGIONS: CPT

## 2018-06-01 NOTE — THERAPY TREATMENT NOTE
"    Outpatient Physical Therapy Ortho Treatment Note  [facility]     Patient Name: Elisa Barrett  : 1990  MRN: [id number]  Today's Date: 2018      Visit Date: 2018    Visit Dx:    ICD-10-CM ICD-9-CM   1. Neck pain M54.2 723.1   2. Chronic pain of both knees M25.561 719.46    M25.562 338.29    G89.29        There is no problem list on file for this patient.       Past Medical History:   Diagnosis Date   • Attention deficit disorder    • Back pain    • Neck pain         No past surgical history on file.                          PT Assessment/Plan     Row Name 18 6768          PT Assessment    Assessment Comments Pt reports that she has been complaint with HEP and has been going to the gym and reports losing 12 pounds. Pt reports no pain anywhere the last week or so. Pt was able to tolerate more manual therapy today so we addressed soft tissue restrictions throughout cervical paraspinals as well as B UT and LS. Also focused on improving thoracic mobility. Continued with postural strengthening as well as core control.   -TR        PT Plan    PT Plan Comments Continue to work cervicothoracic mobiltiy as tolerated. Progress core and postural strengthening.  Bolster HEP to reflect the same.   -TR       User Key  (r) = Recorded By, (t) = Taken By, (c) = Cosigned By    Initials Name Provider Type    TR Chastity Jean Baptiste PTA Physical Therapy Assistant                    Exercises     Row Name 18 1348             Subjective Comments    Subjective Comments Pt reports compliance with HEP   -TR         Subjective Pain    Able to rate subjective pain? yes  -TR      Pre-Treatment Pain Level 0  -TR      Post-Treatment Pain Level 0  -TR         Exercise 1    Exercise Name 1 progressive foam roller stretch wiht / foam roller   -TR      Cueing 1 Verbal  -TR      Time 1 2 min   -TR         Exercise 2    Exercise Name 2 Hooklying on  foam roller \"X\" pattern with red band   -TR      Cueing 2 Verbal "  -TR      Sets 2 1  -TR      Reps 2 20  -TR         Exercise 3    Exercise Name 3 Hooklying on 1/2 foam roller B horizntal abduction with red band   -TR      Cueing 3 Verbal  -TR      Sets 3 2  -TR      Reps 3 20  -TR         Exercise 4    Exercise Name 4 Hooklying on 1/2 foam roller marches   -TR      Cueing 4 Verbal  -TR      Sets 4 1  -TR      Reps 4 20  -TR        User Key  (r) = Recorded By, (t) = Taken By, (c) = Cosigned By    Initials Name Provider Type    TR Chastity Jean Baptiste PTA Physical Therapy Assistant                        Manual Rx (last 36 hours)      Manual Treatments     Row Name 06/01/18 1348             Manual Rx 1    Manual Rx 1 Location Cervical   -TR      Manual Rx 1 Type Subboccipital releases and manual traction  -TR      Manual Rx 1 Grade 2  -TR      Manual Rx 1 Duration 7  -TR         Manual Rx 2    Manual Rx 2 Location Cervical paraspinals and B UT  and LS  -TR      Manual Rx 2 Type STM in hooklying position   -TR      Manual Rx 2 Grade min-mod  -TR      Manual Rx 2 Duration 10  -TR         Manual Rx 3    Manual Rx 3 Location Mid-upper thoracic   -TR      Manual Rx 3 Type STM with blue foam roller   -TR      Manual Rx 3 Grade min-mod  -TR      Manual Rx 3 Duration 10  -TR        User Key  (r) = Recorded By, (t) = Taken By, (c) = Cosigned By    Initials Name Provider Type    TR Chastity Jean Baptiste PTA Physical Therapy Assistant                PT OP Goals     Row Name 06/01/18 1348          PT Short Term Goals    STG Date to Achieve 05/11/18  -TR     STG 1 Improve facet closing mobility on right lower neck  -TR     STG 1 Progress Ongoing  -TR     STG 2 Improve thoracic extension for improved posture  -TR     STG 2 Progress Ongoing  -TR     STG 2 Progress Comments Required cueing for posture today; however pt reports she has been doing better remembering her posture at home.   -TR        Long Term Goals    LTG Date to Achieve 05/25/18  -TR     LTG 1 Pt will be I with HEP to improve  posture and decrease strain on muscles and spine   -TR     LTG 1 Progress Ongoing  -TR     LTG 2 Improve cervical extension to 50+% and full cervical ning rotation  -TR     LTG 2 Progress Ongoing  -TR     LTG 3 Able to do a full squat and return to stand without severe exacerbation of pain  -TR     LTG 3 Progress Ongoing  -TR     LTG 4 Able to ascend/descend stairs with no pain or hesitation  -TR     LTG 4 Progress Ongoing  -TR        Time Calculation    PT Goal Re-Cert Due Date 06/24/18  -TR       User Key  (r) = Recorded By, (t) = Taken By, (c) = Cosigned By    Initials Name Provider Type    TR Chastity Jean Baptiste PTA Physical Therapy Assistant          Therapy Education  Education Details: reviewed clams and UE band exercises previously given   Given: HEP, Symptoms/condition management  Program: Reinforced  How Provided: Verbal  Provided to: Patient  Level of Understanding: Verbalized              Time Calculation:   Start Time: 1348  Stop Time: 1428  Time Calculation (min): 40 min  Total Timed Code Minutes- PT: 40 minute(s)    Therapy Charges for Today     Code Description Service Date Service Provider Modifiers Qty    84901901813 HC PT THER PROC EA 15 MIN 6/1/2018 Chastity Jean Baptiste PTA GP 1    65597623211 HC PT MANUAL THERAPY EA 15 MIN 6/1/2018 Chastity Jean Baptiste PTA GP 2                    Chastity Jean Baptiste PTA  6/1/2018

## 2018-06-05 ENCOUNTER — HOSPITAL ENCOUNTER (OUTPATIENT)
Dept: NUCLEAR MEDICINE | Age: 28
Discharge: HOME | End: 2018-06-07
Payer: MEDICARE

## 2018-06-05 ENCOUNTER — HOSPITAL ENCOUNTER (OUTPATIENT)
Dept: PHYSICAL THERAPY | Facility: HOSPITAL | Age: 28
Setting detail: THERAPIES SERIES
Discharge: HOME | End: 2018-06-05

## 2018-06-05 DIAGNOSIS — K30 FUNCTIONAL DYSPEPSIA: ICD-10-CM

## 2018-06-05 DIAGNOSIS — G89.29 CHRONIC PAIN OF BOTH KNEES: ICD-10-CM

## 2018-06-05 DIAGNOSIS — M25.562 CHRONIC PAIN OF BOTH KNEES: ICD-10-CM

## 2018-06-05 DIAGNOSIS — R10.11 RUQ PAIN: ICD-10-CM

## 2018-06-05 DIAGNOSIS — M25.561 CHRONIC PAIN OF BOTH KNEES: ICD-10-CM

## 2018-06-05 DIAGNOSIS — M54.2 NECK PAIN: Primary | ICD-10-CM

## 2018-06-05 PROCEDURE — 97110 THERAPEUTIC EXERCISES: CPT

## 2018-06-05 PROCEDURE — A9537 TC99M MEBROFENIN: HCPCS | Performed by: FAMILY MEDICINE

## 2018-06-05 PROCEDURE — 6360000004 HC RX CONTRAST MEDICATION: Performed by: FAMILY MEDICINE

## 2018-06-05 PROCEDURE — 97140 MANUAL THERAPY 1/> REGIONS: CPT

## 2018-06-05 PROCEDURE — 3430000000 HC RX DIAGNOSTIC RADIOPHARMACEUTICAL: Performed by: FAMILY MEDICINE

## 2018-06-05 PROCEDURE — 78227 HEPATOBIL SYST IMAGE W/DRUG: CPT

## 2018-06-05 RX ADMIN — SINCALIDE 2 MCG: 5 INJECTION, POWDER, LYOPHILIZED, FOR SOLUTION INTRAVENOUS at 12:00

## 2018-06-05 RX ADMIN — MEBROFENIN 5 MILLICURIE: 45 INJECTION, POWDER, LYOPHILIZED, FOR SOLUTION INTRAVENOUS at 13:01

## 2018-06-05 NOTE — THERAPY TREATMENT NOTE
"    Outpatient Physical Therapy Ortho Treatment Note  [facility]     Patient Name: Elisa Barrett  : 1990  MRN: [id number]  Today's Date: 2018      Visit Date: 2018    Visit Dx:    ICD-10-CM ICD-9-CM   1. Neck pain M54.2 723.1   2. Chronic pain of both knees M25.561 719.46    M25.562 338.29    G89.29        There is no problem list on file for this patient.       Past Medical History:   Diagnosis Date   • Attention deficit disorder    • Back pain    • Neck pain         No past surgical history on file.                          PT Assessment/Plan     Row Name 18 1600          PT Assessment    Assessment Comments Pt reports home exercises are going well and she is happy with her overall improvement. Squattting is still very painful for her, especially in her R knee. We worked on proper squat form.  -RA        PT Plan    PT Plan Comments Continue previous mentioned plan of care. Progress toward more functional exercises as tolerated.   -RA       User Key  (r) = Recorded By, (t) = Taken By, (c) = Cosigned By    Initials Name Provider Type    RA Jessi Cantu, PT Physical Therapist                    Exercises     Row Name 18 1500             Subjective Comments    Subjective Comments She reports she's really tired. Execises at home have been going great.   -RA         Subjective Pain    Able to rate subjective pain? yes  -RA      Pre-Treatment Pain Level 5  -RA      Post-Treatment Pain Level 0  -RA         Exercise 1    Exercise Name 1 progressive foam roller stretch wiht 1/2 foam roller   -RA      Cueing 1 Verbal  -RA      Time 1 2 min  -RA         Exercise 2    Exercise Name 2 Hooklying on 1/2 foam roller \"X\" pattern with red band   -RA      Cueing 2 Verbal  -RA      Sets 2 1  -RA      Reps 2 20  -RA         Exercise 3    Exercise Name 3 Hooklying on 1/2 foam roller B horizntal abduction with red band   -RA      Cueing 3 Verbal  -RA      Sets 3 2  -RA      Reps 3 20  -RA         " Exercise 4    Exercise Name 4 Seated marches on ball  -RA      Cueing 4 Verbal  -RA      Sets 4 2  -RA      Reps 4 20  -RA         Exercise 5    Exercise Name 5 Seated anterior pelvic tilts on ball  -RA      Cueing 5 Verbal  -RA      Sets 5 2  -RA      Reps 5 20  -RA         Exercise 6    Exercise Name 6 Squat assessment  -RA      Cueing 6 Verbal;Demo  -RA      Additional Comments See assessment  -RA        User Key  (r) = Recorded By, (t) = Taken By, (c) = Cosigned By    Initials Name Provider Type    DENIS Bowen S Alondra, PT Physical Therapist                        Manual Rx (last 36 hours)      Manual Treatments     Row Name 06/05/18 1500             Manual Rx 1    Manual Rx 1 Location Cervical   -RA      Manual Rx 1 Type Subboccipital releases and manual traction  -RA      Manual Rx 1 Grade 2  -RA      Manual Rx 1 Duration 5  -RA         Manual Rx 3    Manual Rx 3 Location Mid-upper thoracic   -RA      Manual Rx 3 Type STM with blue foam roller   -RA      Manual Rx 3 Grade min-mod  -RA      Manual Rx 3 Duration 5  -RA        User Key  (r) = Recorded By, (t) = Taken By, (c) = Cosigned By    Initials Name Provider Type    DENIS Cantu, PT Physical Therapist                PT OP Goals     Row Name 06/05/18 1600 06/05/18 1500       PT Short Term Goals    STG Date to Achieve  -- 05/11/18  -RA    STG 1  -- Improve facet closing mobility on right lower neck  -RA    STG 1 Progress  -- Ongoing  -RA    STG 2  -- Improve thoracic extension for improved posture  -RA    STG 2 Progress  -- Ongoing  -RA       Long Term Goals    LTG Date to Achieve  -- 05/25/18  -RA    LTG 1  -- Pt will be I with HEP to improve posture and decrease strain on muscles and spine   -RA    LTG 1 Progress  -- Ongoing  -RA    LTG 2  -- Improve cervical extension to 50+% and full cervical ning rotation  -RA    LTG 2 Progress  -- Ongoing  -RA    LTG 3  -- Able to do a full squat and return to stand without severe exacerbation of pain  -RA    LTG  3 Progress  -- Ongoing  -RA    LTG 3 Progress Comments  -- Squats are still very painful  -    LTG 4  -- Able to ascend/descend stairs with no pain or hesitation  -    LTG 4 Progress  -- Ongoing  -RA       Time Calculation    PT Goal Re-Cert Due Date 06/24/18  -RA  --      User Key  (r) = Recorded By, (t) = Taken By, (c) = Cosigned By    Initials Name Provider Type    RA Jessi Cantu, PT Physical Therapist          Therapy Education  Given: HEP, Posture/body mechanics  Program: Reinforced  How Provided: Verbal  Provided to: Patient  Level of Understanding: Verbalized              Time Calculation:   Start Time: 1534  Stop Time: 1618  Time Calculation (min): 44 min    Therapy Charges for Today     Code Description Service Date Service Provider Modifiers Qty    31232773545 HC PT MANUAL THERAPY EA 15 MIN 6/5/2018 Jessi Cantu, PT GP 1    25887442386 HC PT THER PROC EA 15 MIN 6/5/2018 Jessi Cantu PT GP 2                    Jessi Cantu PT  6/5/2018

## 2018-06-12 ENCOUNTER — HOSPITAL ENCOUNTER (OUTPATIENT)
Dept: PHYSICAL THERAPY | Facility: HOSPITAL | Age: 28
Setting detail: THERAPIES SERIES
Discharge: HOME | End: 2018-06-12

## 2018-06-12 DIAGNOSIS — M25.561 CHRONIC PAIN OF BOTH KNEES: ICD-10-CM

## 2018-06-12 DIAGNOSIS — G89.29 CHRONIC PAIN OF BOTH KNEES: ICD-10-CM

## 2018-06-12 DIAGNOSIS — M54.2 NECK PAIN: Primary | ICD-10-CM

## 2018-06-12 DIAGNOSIS — M25.562 CHRONIC PAIN OF BOTH KNEES: ICD-10-CM

## 2018-06-12 PROCEDURE — 97110 THERAPEUTIC EXERCISES: CPT

## 2018-06-12 PROCEDURE — 97140 MANUAL THERAPY 1/> REGIONS: CPT

## 2018-06-12 NOTE — THERAPY TREATMENT NOTE
Outpatient Physical Therapy Ortho Treatment Note  [facility]     Patient Name: Elisa Barrett  : 1990  MRN: [id number]  Today's Date: 2018      Visit Date: 2018    Visit Dx:    ICD-10-CM ICD-9-CM   1. Neck pain M54.2 723.1   2. Chronic pain of both knees M25.561 719.46    M25.562 338.29    G89.29        There is no problem list on file for this patient.       Past Medical History:   Diagnosis Date   • Attention deficit disorder    • Back pain    • Neck pain         No past surgical history on file.                          PT Assessment/Plan     Row Name 18 1435          PT Assessment    Assessment Comments Patient reports that her neck is feeling better, and that her knees are hurting more.  However, she continues to have poor posture with increased forward shoulders.  Today's focus was on postural strengthening.  At this point there was minimal tissue guarding present in her cervical/scapular region, therefore we will move away from manual therapy in the future.  She does continue to need constant cueing for maintaining correct posture.  -AYALA        PT Plan    PT Plan Comments We will continue with postural strengthening and endurance training.  -AYALA       User Key  (r) = Recorded By, (t) = Taken By, (c) = Cosigned By    Initials Name Provider Type    AYALA Melendez PTA Physical Therapy Assistant                    Exercises     Row Name 18 1438 18 1435          Subjective Comments    Subjective Comments  -- Patient reports her bilateral knees are hurting her all the time.  She reports her neck seems to be a little better  -AYALA        Subjective Pain    Able to rate subjective pain?  -- yes  -AYALA     Pre-Treatment Pain Level  -- 0  -AYALA     Post-Treatment Pain Level  -- 0  -AYALA        Total Minutes    14587 - PT Therapeutic Exercise Minutes  -- 27  -AYALA     09980 - PT Manual Therapy Minutes 11  -AYALA  --        Exercise 1    Exercise Name 1  -- progressive foam roller  "stretch with 1/2 foam roller   -AYALA     Cueing 1  -- Verbal  -AYALA     Time 1  -- 2 min  -AYALA        Exercise 2    Exercise Name 2  -- Hooklying on 1/2 foam roller \"X\" pattern with red band   -AYALA     Cueing 2  -- Verbal  -AYALA     Sets 2  -- 2  -AYALA     Reps 2  -- 10  -AYALA        Exercise 3    Exercise Name 3  -- seated on 75cm ball: seated W's  -AYALA     Cueing 3  -- Verbal;Tactile;Demo  -AYALA     Sets 3  -- 2  -AYALA     Reps 3  -- 10  -AYALA     Additional Comments  -- red band, added to HEP  -AYALA       User Key  (r) = Recorded By, (t) = Taken By, (c) = Cosigned By    Initials Name Provider Type    AYALA Melendez PTA Physical Therapy Assistant                        Manual Rx (last 36 hours)      Manual Treatments     Row Name 06/12/18 1438             Total Minutes    58585 - PT Manual Therapy Minutes 11  -AYALA         Manual Rx 1    Manual Rx 1 Location Mid-upper thoracic   -AYALA      Manual Rx 1 Type STM with blue foam roller   -AYALA      Manual Rx 1 Grade min-mod  -AYALA      Manual Rx 1 Duration 5  -AYALA         Manual Rx 2    Manual Rx 2 Location bilateral UT/LS  -AYALA      Manual Rx 2 Type STM  -AYALA      Manual Rx 2 Grade moderate  -AYALA      Manual Rx 2 Duration 6  -AYALA        User Key  (r) = Recorded By, (t) = Taken By, (c) = Cosigned By    Initials Name Provider Type    AYALA Melendez PTA Physical Therapy Assistant                PT OP Goals     Row Name 06/12/18 1435          PT Short Term Goals    STG Date to Achieve 05/11/18  -AYAAL     STG 1 Improve facet closing mobility on right lower neck  -AYALA     STG 1 Progress Ongoing  -AYALA     STG 2 Improve thoracic extension for improved posture  -AYALA     STG 2 Progress Ongoing  -AYALA     STG 2 Progress Comments She continues to have forward slumped posture  -AYALA        Long Term Goals    LTG Date to Achieve 05/25/18  -AYALA     LTG 1 Pt will be I with HEP to improve posture and decrease strain on muscles and spine   -AYALA     LTG 1 Progress Ongoing  -AYALA     LTG 2 Improve cervical extension to " 50+% and full cervical ning rotation  -AYALA     LTG 2 Progress Ongoing  -AYALA     LTG 3 Able to do a full squat and return to stand without severe exacerbation of pain  -AYALA     LTG 3 Progress Ongoing  -AYALA     LTG 4 Able to ascend/descend stairs with no pain or hesitation  -AYALA     LTG 4 Progress Ongoing  -AYALA        Time Calculation    PT Goal Re-Cert Due Date 06/24/18  -AYALA       User Key  (r) = Recorded By, (t) = Taken By, (c) = Cosigned By    Initials Name Provider Type    AYALA Melendez PTA Physical Therapy Assistant          Therapy Education  Education Details: seated W's with red band  Given: HEP  Program: New  How Provided: Verbal, Demonstration, Written  Provided to: Patient  Level of Understanding: Verbalized, Demonstrated              Time Calculation:   Start Time: 1435  Stop Time: 1515  Time Calculation (min): 40 min  Total Timed Code Minutes- PT: 40 minute(s)  Therapy Suggested Charges     Code   Minutes Charges    73033 (CPT®) Hc Pt Neuromusc Re Education Ea 15 Min      47404 (CPT®) Hc Pt Ther Proc Ea 15 Min 27 2    57460 (CPT®) Hc Gait Training Ea 15 Min      91707 (CPT®) Hc Pt Therapeutic Act Ea 15 Min      52782 (CPT®) Hc Pt Manual Therapy Ea 15 Min 11 1    35002 (CPT®) Hc Pt Ther Massage- Per 15 Min      00804 (CPT®) Hc Pt Iontophoresis Ea 15 Min      09998 (CPT®) Hc Pt Elec Stim Ea-Per 15 Min      85650 (CPT®) Hc Pt Ultrasound Ea 15 Min      00322 (CPT®) Hc Pt Self Care/Mgmt/Train Ea 15 Min      Total  38 3        Therapy Charges for Today     Code Description Service Date Service Provider Modifiers Qty    35205226536 HC PT THER PROC EA 15 MIN 6/12/2018 Navin Melendez PTA GP 2    15348288375 HC PT MANUAL THERAPY EA 15 MIN 6/12/2018 Navin Melendez PTA GP 1                    Navin Melendez PTA  6/13/2018

## 2018-06-15 ENCOUNTER — HOSPITAL ENCOUNTER (OUTPATIENT)
Dept: PHYSICAL THERAPY | Facility: HOSPITAL | Age: 28
Setting detail: THERAPIES SERIES
Discharge: HOME | End: 2018-06-15

## 2018-06-15 DIAGNOSIS — M54.2 NECK PAIN: Primary | ICD-10-CM

## 2018-06-15 DIAGNOSIS — M25.562 CHRONIC PAIN OF BOTH KNEES: ICD-10-CM

## 2018-06-15 DIAGNOSIS — M25.561 CHRONIC PAIN OF BOTH KNEES: ICD-10-CM

## 2018-06-15 DIAGNOSIS — G89.29 CHRONIC PAIN OF BOTH KNEES: ICD-10-CM

## 2018-06-15 PROCEDURE — 97110 THERAPEUTIC EXERCISES: CPT

## 2018-06-15 NOTE — THERAPY TREATMENT NOTE
Outpatient Physical Therapy Ortho Treatment Note  [facility]     Patient Name: Elisa Barrett  : 1990  MRN: [id number]  Today's Date: 6/15/2018      Visit Date: 06/15/2018    Visit Dx:    ICD-10-CM ICD-9-CM   1. Neck pain M54.2 723.1   2. Chronic pain of both knees M25.561 719.46    M25.562 338.29    G89.29        There is no problem list on file for this patient.       Past Medical History:   Diagnosis Date   • Attention deficit disorder    • Back pain    • Neck pain         No past surgical history on file.                          PT Assessment/Plan     Row Name 06/15/18 1348          PT Assessment    Assessment Comments Patient continues to have poor posture with increased forward shoulders.  Therefore, today's focus was on postural strengthening and core strengthening.  As soon as she is finished with exercises she quickly reverts back to poor postural habits.  She also stands in anterior pelvic tilt  -AYALA        PT Plan    PT Plan Comments We will continue to progress with postural strengthening and endurance.  -AYALA       User Key  (r) = Recorded By, (t) = Taken By, (c) = Cosigned By    Initials Name Provider Type    AYALA Melendez, PTA Physical Therapy Assistant                    Exercises     Row Name 06/15/18 1345             Subjective Comments    Subjective Comments Patient reports she is not having pain.  She reports he is tired today.  -AYALA         Subjective Pain    Able to rate subjective pain? yes  -AYALA      Pre-Treatment Pain Level 0  -AYALA      Post-Treatment Pain Level 0  -AYALA         Total Minutes    28496 - PT Therapeutic Exercise Minutes 40  -AYALA         Exercise 1    Exercise Name 1 progressive foam roller stretch with 1/2 foam roller   -AYALA      Cueing 1 Verbal  -AYALA      Time 1 2 minutes  -AYALA         Exercise 2    Exercise Name 2 Hooklying on 1/2 foam roller horizontal abduction  -AYALA      Cueing 2 Verbal;Tactile  -AYALA      Sets 2 3  -AYALA      Reps 2 10  -AYALA      Additional Comments  red band  -AYALA         Exercise 3    Exercise Name 3 1/2 foam roller: marching  -AYALA      Cueing 3 Verbal;Tactile  -AYALA      Sets 3 3  -AYALA      Reps 3 10  -AYALA         Exercise 4    Exercise Name 4 seated on 75cm ball: seated W's  -AYALA      Cueing 4 Verbal;Tactile  -AYALA      Sets 4 3  -AYALA      Reps 4 10  -AYALA      Additional Comments red band  -AYALA         Exercise 5    Exercise Name 5 standing at wall at pink foam roller: horizontal abduction  -AYALA      Cueing 5 Tactile;Verbal;Demo  -AYALA      Sets 5 2  -AYALA      Reps 5 10  -AYALA      Additional Comments red band  -AYALA         Exercise 6    Exercise Name 6 prone scapular retraction  -AYALA      Cueing 6 Verbal;Tactile  -AYALA      Sets 6 2  -AYALA      Reps 6 10  -AYALA      Time 6 3 second holds  -AYALA         Exercise 7    Exercise Name 7 prone unilateral Y's eccentric lowering  -AYALA      Cueing 7 Verbal;Tactile  -AYALA      Sets 7 1  -AYALA      Reps 7 10 bilaterally  -AYALA      Additional Comments she was unable to hold against gravity, therefore worked on lowering  -AYALA        User Key  (r) = Recorded By, (t) = Taken By, (c) = Cosigned By    Initials Name Provider Type    AYALA Melendez, PTA Physical Therapy Assistant                               PT OP Goals     Row Name 06/15/18 1348          PT Short Term Goals    STG Date to Achieve 05/11/18  -AYALA     STG 1 Improve facet closing mobility on right lower neck  -AYALA     STG 1 Progress Ongoing  -AYALA     STG 2 Improve thoracic extension for improved posture  -AYALA     STG 2 Progress Ongoing  -AYALA     STG 2 Progress Comments she continues to present with poor posture with forward shoulder posture  -AYALA        Long Term Goals    LTG Date to Achieve 05/25/18  -AYALA     LTG 1 Pt will be I with HEP to improve posture and decrease strain on muscles and spine   -AYALA     LTG 1 Progress Ongoing  -AYALA     LTG 1 Progress Comments --  -AYALA     LTG 2 Improve cervical extension to 50+% and full cervical ning rotation  -AYALA     LTG 2 Progress Ongoing  -AYALA     LTG 3 Able to  do a full squat and return to stand without severe exacerbation of pain  -AYALA     LTG 3 Progress Ongoing  -AYALA     LTG 4 Able to ascend/descend stairs with no pain or hesitation  -AYALA     LTG 4 Progress Ongoing  -AYALA        Time Calculation    PT Goal Re-Cert Due Date 06/24/18  -AYALA       User Key  (r) = Recorded By, (t) = Taken By, (c) = Cosigned By    Initials Name Provider Type    AYALA Melendez PTA Physical Therapy Assistant          Therapy Education  Given: HEP  Program: Reinforced  How Provided: Verbal  Provided to: Patient  Level of Understanding: Verbalized              Time Calculation:   Start Time: 1348  Stop Time: 1430  Time Calculation (min): 42 min  Total Timed Code Minutes- PT: 42 minute(s)  Therapy Suggested Charges     Code   Minutes Charges    17734 (CPT®) Hc Pt Neuromusc Re Education Ea 15 Min      68368 (CPT®) Hc Pt Ther Proc Ea 15 Min 40 3    15453 (CPT®) Hc Gait Training Ea 15 Min      33035 (CPT®) Hc Pt Therapeutic Act Ea 15 Min      65523 (CPT®) Hc Pt Manual Therapy Ea 15 Min      49984 (CPT®) Hc Pt Ther Massage- Per 15 Min      65224 (CPT®) Hc Pt Iontophoresis Ea 15 Min      97558 (CPT®) Hc Pt Elec Stim Ea-Per 15 Min      63537 (CPT®) Hc Pt Ultrasound Ea 15 Min      22987 (CPT®) Hc Pt Self Care/Mgmt/Train Ea 15 Min      Total  40 3        Therapy Charges for Today     Code Description Service Date Service Provider Modifiers Qty    21704268670 HC PT THER PROC EA 15 MIN 6/15/2018 Navin Melendez PTA GP 3                    Navin Melendez PTA  6/15/2018

## 2018-06-18 ENCOUNTER — HOSPITAL ENCOUNTER (OUTPATIENT)
Dept: PHYSICAL THERAPY | Facility: HOSPITAL | Age: 28
Setting detail: THERAPIES SERIES
Discharge: HOME | End: 2018-06-18

## 2018-06-18 DIAGNOSIS — M25.562 CHRONIC PAIN OF BOTH KNEES: ICD-10-CM

## 2018-06-18 DIAGNOSIS — M54.2 NECK PAIN: Primary | ICD-10-CM

## 2018-06-18 DIAGNOSIS — M25.561 CHRONIC PAIN OF BOTH KNEES: ICD-10-CM

## 2018-06-18 DIAGNOSIS — G89.29 CHRONIC PAIN OF BOTH KNEES: ICD-10-CM

## 2018-06-18 PROCEDURE — 97110 THERAPEUTIC EXERCISES: CPT

## 2018-06-18 NOTE — THERAPY TREATMENT NOTE
Outpatient Physical Therapy Ortho Treatment Note  [facility]     Patient Name: Elisa Barrett  : 1990  MRN: [id number]  Today's Date: 2018      Visit Date: 2018    Visit Dx:    ICD-10-CM ICD-9-CM   1. Neck pain M54.2 723.1   2. Chronic pain of both knees M25.561 719.46    M25.562 338.29    G89.29        There is no problem list on file for this patient.       Past Medical History:   Diagnosis Date   • Attention deficit disorder    • Back pain    • Neck pain         No past surgical history on file.                          PT Assessment/Plan     Row Name 18 9391          PT Assessment    Assessment Comments Patient continues to demonstrate decreased strength and recruitment of her core muscles, therefore placing her in more of an anterior pelvic tilt. She continues to have severe forward shoulder posture as well with needing cueing 75% of the time to correct.  She continues to have difficulty following cueing, and she needed moderate to maximum assistance for task today.  However, today's activities were more challenging with movement in all extremities.  -AYALA        PT Plan    PT Plan Comments We will contine with core strengthening and postural strengthening in functional positions.  -AYALA       User Key  (r) = Recorded By, (t) = Taken By, (c) = Cosigned By    Initials Name Provider Type    AYALA Melendez PTA Physical Therapy Assistant                    Exercises     Row Name 18 0867             Subjective Comments    Subjective Comments Patient reports she is not having pain today.  She reports compliance with HEP.    -AYALA         Subjective Pain    Able to rate subjective pain? yes  -AYALA      Pre-Treatment Pain Level 3  -AYALA      Post-Treatment Pain Level 0  -AYALA      Subjective Pain Comment right side of neck  -AYALA         Total Minutes    95803 - PT Therapeutic Exercise Minutes 41  -AYALA         Exercise 1    Exercise Name 1 progressive foam roller stretch with 1/2 foam roller    -AYALA      Cueing 1 Verbal  -AYALA      Time 1 2 minutes  -AYALA         Exercise 2    Exercise Name 2 1/2 foam roller: marching  -AYALA      Cueing 2 Verbal;Tactile  -AYALA      Sets 2 3  -AYALA      Reps 2 10  -AYALA         Exercise 3    Exercise Name 3 core roll outs with feet on 55cm ball  -AYALA      Cueing 3 Verbal;Tactile  -AYALA      Sets 3 4  -AYALA      Reps 3 10  -AYALA      Additional Comments 3rd set added UE chops, 4th set added 2# ball to UEs.  She needed moderate cueing  -AYALA         Exercise 4    Exercise Name 4 attempted DLPS with alternating arms and legs, however patient was unable to complete  -AYALA         Exercise 5    Exercise Name 5 hooklying reverse marching with hips flexed to 90 degrees  -AYALA      Cueing 5 Verbal;Tactile  -AYALA      Sets 5 1  -AYALA      Reps 5 10  -AYALA      Additional Comments max cueing needed  -AYALA         Exercise 6    Exercise Name 6 tall kneeling on blue therafoam: scapular retraction  -AYALA      Cueing 6 Verbal;Tactile;Demo  -AYALA      Sets 6 1  -AYALA      Reps 6 10  -AYALA      Additional Comments attempted with elbows extended and flexed, but patient continued to have difficulty understand the task and was not performing correctly  -AYALA         Exercise 7    Exercise Name 7 wall squats against 55cm ball  -AYALA      Cueing 7 Verbal;Tactile;Demo  -AYALA      Sets 7 2  -AYALA      Reps 7 10  -AYALA        User Key  (r) = Recorded By, (t) = Taken By, (c) = Cosigned By    Initials Name Provider Type    AYALA Melendez, PTA Physical Therapy Assistant                               PT OP Goals     Row Name 06/18/18 1349          PT Short Term Goals    STG Date to Achieve 05/11/18  -AYALA     STG 1 Improve facet closing mobility on right lower neck  -AYALA     STG 1 Progress Ongoing  -AYALA     STG 2 Improve thoracic extension for improved posture  -AYALA     STG 2 Progress Ongoing  -AYALA        Long Term Goals    LTG Date to Achieve 05/25/18  -AYALA     LTG 1 Pt will be I with HEP to improve posture and decrease strain on muscles and spine    -AYALA     LTG 1 Progress Ongoing  -AYALA     LTG 2 Improve cervical extension to 50+% and full cervical ning rotation  -AYALA     LTG 2 Progress Ongoing  -AYALA     LTG 3 Able to do a full squat and return to stand without severe exacerbation of pain  -AYALA     LTG 3 Progress Ongoing  -AYALA     LTG 3 Progress Comments completed wall ball squats painfree  -AYALA     LTG 4 Able to ascend/descend stairs with no pain or hesitation  -AYALA     LTG 4 Progress Ongoing  -AYALA        Time Calculation    PT Goal Re-Cert Due Date 06/24/18  -AYALA       User Key  (r) = Recorded By, (t) = Taken By, (c) = Cosigned By    Initials Name Provider Type    AYALA Melendez PTA Physical Therapy Assistant          Therapy Education  Education Details:    Given: HEP  Program: Reinforced  How Provided: Verbal  Provided to: Patient  Level of Understanding: Verbalized              Time Calculation:   Start Time: 1349  Stop Time: 1430  Time Calculation (min): 41 min  Total Timed Code Minutes- PT: 41 minute(s)  Therapy Suggested Charges     Code   Minutes Charges    83594 (CPT®) Hc Pt Neuromusc Re Education Ea 15 Min      55857 (CPT®) Hc Pt Ther Proc Ea 15 Min 41 3    61563 (CPT®) Hc Gait Training Ea 15 Min      12689 (CPT®) Hc Pt Therapeutic Act Ea 15 Min      21143 (CPT®) Hc Pt Manual Therapy Ea 15 Min      44449 (CPT®) Hc Pt Ther Massage- Per 15 Min      39832 (CPT®) Hc Pt Iontophoresis Ea 15 Min      53998 (CPT®) Hc Pt Elec Stim Ea-Per 15 Min      83020 (CPT®) Hc Pt Ultrasound Ea 15 Min      90546 (CPT®) Hc Pt Self Care/Mgmt/Train Ea 15 Min      Total  41 3        Therapy Charges for Today     Code Description Service Date Service Provider Modifiers Qty    13882123572 HC PT THER PROC EA 15 MIN 6/18/2018 Navin Melendez PTA GP 3                    Navin Melendez PTA  6/18/2018

## 2018-06-29 ENCOUNTER — APPOINTMENT (OUTPATIENT)
Dept: PHYSICAL THERAPY | Facility: HOSPITAL | Age: 28
End: 2018-06-29

## 2018-08-15 ENCOUNTER — DOCUMENTATION (OUTPATIENT)
Dept: PHYSICAL THERAPY | Facility: HOSPITAL | Age: 28
End: 2018-08-15

## 2018-08-15 DIAGNOSIS — M25.562 CHRONIC PAIN OF BOTH KNEES: ICD-10-CM

## 2018-08-15 DIAGNOSIS — M25.561 CHRONIC PAIN OF BOTH KNEES: ICD-10-CM

## 2018-08-15 DIAGNOSIS — M54.2 NECK PAIN: Primary | ICD-10-CM

## 2018-08-15 DIAGNOSIS — G89.29 CHRONIC PAIN OF BOTH KNEES: ICD-10-CM

## 2018-08-23 ENCOUNTER — DOCUMENTATION (OUTPATIENT)
Dept: PHYSICAL THERAPY | Facility: HOSPITAL | Age: 28
End: 2018-08-23

## 2018-08-23 DIAGNOSIS — M54.2 NECK PAIN: Primary | ICD-10-CM

## 2018-08-23 DIAGNOSIS — M25.562 CHRONIC PAIN OF BOTH KNEES: ICD-10-CM

## 2018-08-23 DIAGNOSIS — G89.29 CHRONIC PAIN OF BOTH KNEES: ICD-10-CM

## 2018-08-23 DIAGNOSIS — M25.561 CHRONIC PAIN OF BOTH KNEES: ICD-10-CM

## 2018-08-23 NOTE — THERAPY DISCHARGE NOTE
Outpatient Physical Therapy Discharge Summary         Patient Name: Elisa Barrett  : 1990  MRN: [id number]    Today's Date: 2018    Visit Dx:    ICD-10-CM ICD-9-CM   1. Neck pain M54.2 723.1   2. Chronic pain of both knees M25.561 719.46    M25.562 338.29    G89.29              PT OP Goals     Row Name 18 1415          PT Short Term Goals    STG Date to Achieve 18  -AYALA     STG 1 Improve facet closing mobility on right lower neck  -AYALA     STG 1 Progress Not Met  -AYALA     STG 1 Progress Comments Patient is not in the clinic to formally assess today.  -AYALA     STG 2 Improve thoracic extension for improved posture  -AYALA     STG 2 Progress Not Met  -AYALA     STG 2 Progress Comments She continued to have increased thoracic kyphosis.  -AYALA        Long Term Goals    LTG Date to Achieve 18  -AYALA     LTG 1 Pt will be I with HEP to improve posture and decrease strain on muscles and spine   -AYALA     LTG 1 Progress Partially Met  -AYALA     LTG 1 Progress Comments She is not in the clinic to formally assess today. However, she reported compliance while coming to therapy.  -AYALA     LTG 2 Improve cervical extension to 50+% and full cervical ning rotation  -AYALA     LTG 2 Progress Not Met  -AYALA     LTG 2 Progress Comments She had 45 degrees bilaterally, and had 25% of cervical extension during the last measurements with pain.  -AYALA     LTG 3 Able to do a full squat and return to stand without severe exacerbation of pain  -AYALA     LTG 3 Progress Not Met  -AYALA     LTG 3 Progress Comments She is not in the clinic to formally assess,  but she did perform wall squats painfree during her last visit.  -AYALA     LTG 4 Able to ascend/descend stairs with no pain or hesitation  -AYALA     LTG 4 Progress Not Met  -AYALA     LTG 4 Progress Comments She is not in the clinic to formally assess today,  but she did continue to have hesitation while she was in the clinic.  -AYALA       User Key  (r) = Recorded By, (t) = Taken By, (c) =  Cosigned By    Initials Name Provider Type    Navin Solares PTA Physical Therapy Assistant          OP PT Discharge Summary  Date of Discharge: 08/23/18  Reason for Discharge: other (comment)  Outcomes Achieved: Refer to plan of care for updates on goals achieved  Discharge Destination: Home without follow-up, Home with home program  Discharge Instructions/Additional Comments: Patient has not been seen since 6/18/18 due to no showing her cancelling her last appointments.  While she was in the clinic we were working to improve her posture, but she continued to have increased thoracic kyphosis.  She was also being progressed with LE control due to knee pain, but she continued to lack eccentric control.        Time Calculation:        Therapy Suggested Charges     Code   Minutes Charges    None                       Navin Melendez PTA  8/23/2018

## 2018-10-08 ENCOUNTER — APPOINTMENT (OUTPATIENT)
Dept: LAB | Facility: HOSPITAL | Age: 28
End: 2018-10-08
Attending: PSYCHIATRY & NEUROLOGY

## 2018-10-08 ENCOUNTER — TRANSCRIBE ORDERS (OUTPATIENT)
Dept: ADMINISTRATIVE | Facility: HOSPITAL | Age: 28
End: 2018-10-08

## 2018-10-08 DIAGNOSIS — Z79.899 DRUG THERAPY: Primary | ICD-10-CM

## 2018-10-08 LAB
ARTICHOKE IGE QN: 79 MG/DL (ref 0–99)
CHOLEST SERPL-MCNC: 145 MG/DL (ref 130–200)
GLUCOSE P FAST SERPL-MCNC: 89 MG/DL (ref 70–100)
HBA1C MFR BLD: 5 %
HDLC SERPL-MCNC: 52 MG/DL
LDLC/HDLC SERPL: 1.55 {RATIO}
TRIGL SERPL-MCNC: 63 MG/DL (ref 0–149)

## 2018-10-08 PROCEDURE — 82947 ASSAY GLUCOSE BLOOD QUANT: CPT | Performed by: PSYCHIATRY & NEUROLOGY

## 2018-10-08 PROCEDURE — 80061 LIPID PANEL: CPT | Performed by: PSYCHIATRY & NEUROLOGY

## 2018-10-08 PROCEDURE — 36415 COLL VENOUS BLD VENIPUNCTURE: CPT | Performed by: PSYCHIATRY & NEUROLOGY

## 2018-10-08 PROCEDURE — 83036 HEMOGLOBIN GLYCOSYLATED A1C: CPT | Performed by: PSYCHIATRY & NEUROLOGY

## 2019-03-09 ENCOUNTER — APPOINTMENT (OUTPATIENT)
Dept: CT IMAGING | Facility: HOSPITAL | Age: 29
End: 2019-03-09

## 2019-03-09 ENCOUNTER — HOSPITAL ENCOUNTER (EMERGENCY)
Facility: HOSPITAL | Age: 29
Discharge: HOME | End: 2019-03-09
Admitting: EMERGENCY MEDICINE

## 2019-03-09 VITALS
TEMPERATURE: 98.6 F | WEIGHT: 180 LBS | HEIGHT: 65 IN | HEART RATE: 67 BPM | SYSTOLIC BLOOD PRESSURE: 135 MMHG | RESPIRATION RATE: 18 BRPM | BODY MASS INDEX: 29.99 KG/M2 | OXYGEN SATURATION: 100 % | DIASTOLIC BLOOD PRESSURE: 88 MMHG

## 2019-03-09 DIAGNOSIS — S05.01XA ABRASION OF RIGHT CORNEA, INITIAL ENCOUNTER: ICD-10-CM
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
## 2019-03-09 LAB — HCG SERPL QL: NEGATIVE

## 2019-03-09 PROCEDURE — 99284 EMERGENCY DEPT VISIT MOD MDM: CPT

## 2019-03-09 PROCEDURE — 84703 CHORIONIC GONADOTROPIN ASSAY: CPT | Performed by: PHYSICIAN ASSISTANT

## 2019-03-09 PROCEDURE — 70450 CT HEAD/BRAIN W/O DYE: CPT

## 2019-03-09 PROCEDURE — 70480 CT ORBIT/EAR/FOSSA W/O DYE: CPT

## 2019-03-09 PROCEDURE — 70486 CT MAXILLOFACIAL W/O DYE: CPT

## 2019-03-09 RX ORDER — ERYTHROMYCIN 5 MG/G
OINTMENT OPHTHALMIC NIGHTLY
Qty: 3.5 G | Refills: 0 | Status: SHIPPED | OUTPATIENT
Start: 2019-03-09 | End: 2019-03-22

## 2019-03-09 RX ORDER — PROPARACAINE HYDROCHLORIDE 5 MG/ML
2 SOLUTION/ DROPS OPHTHALMIC ONCE
Status: COMPLETED | OUTPATIENT
Start: 2019-03-09 | End: 2019-03-09

## 2019-03-09 RX ADMIN — PROPARACAINE HYDROCHLORIDE 2 DROP: 5 SOLUTION/ DROPS OPHTHALMIC at 15:31

## 2019-03-09 RX ADMIN — FLUORESCEIN SODIUM 1 STRIP: 1 STRIP OPHTHALMIC at 15:45
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
## 2019-03-09 NOTE — DISCHARGE INSTRUCTIONS
Corneal Abrasion  A corneal abrasion is a scratch or injury to the clear covering over the front of your eye (cornea). This can be painful. It is important to get treatment for a corneal abrasion. If this problem is not treated, it can affect your eyesight (vision).  Follow these instructions at home:  Medicines  · Use eye drops or ointments as told by your doctor.  · If you were prescribed antibiotic drops or ointment, use them as told by your doctor. Do not stop using the antibiotic even if you start to feel better.  · Take over-the-counter and prescription medicines only as told by your doctor.  · Do not drive or use heavy machinery while taking prescription pain medicine.  General instructions  · If you have an eye patch, wear it as told by your doctor.  ? Do not drive or use machinery while wearing an eye patch.  ? Follow instructions from your doctor about when to take off the patch.  · Ask your doctor if you can use a cold, wet cloth (compress) on your eye to help with pain.  · Do not rub or touch your eye. Do not wash out your eye.  · Do not wear contact lenses until your doctor says that this is okay.  · Avoid bright light.  · Avoid straining your eyes.  · Keep all follow-up visits as told by your doctor. Doing this can help to prevent infection and loss of eyesight.  Contact a doctor if:  · You continue to have eye pain and other symptoms for more than 2 days.  · You get new symptoms, such as:  ? Redness.  ? Watery eyes (tearing).  ? Discharge.  · You have discharge that makes your eyelids stick together in the morning.  · Symptoms come back after your eye heals.  Get help right away if:  · You have very bad eye pain that does not get better with medicine.  · You lose eyesight.  Summary  · A corneal abrasion is a scratch or injury to the clear covering over the front of your eye (cornea).  · It is important to get treatment for a corneal abrasion. If this problem is not treated, it can affect your eyesight  (vision).  · Use eye drops or ointments as told by your doctor.  · If you have an eye patch, do not drive or use machinery while wearing it.  This information is not intended to replace advice given to you by your health care provider. Make sure you discuss any questions you have with your health care provider.  Document Released: 06/05/2009 Document Revised: 12/02/2017 Document Reviewed: 12/02/2017  [employer] Interactive Patient Education © 2017 [employer]

## 2019-03-22 PROCEDURE — 87081 CULTURE SCREEN ONLY: CPT | Performed by: NURSE PRACTITIONER

## 2019-04-10 ENCOUNTER — TELEPHONE (OUTPATIENT)
Dept: PHYSICAL THERAPY | Facility: HOSPITAL | Age: 29
End: 2019-04-10

## 2019-04-10 ENCOUNTER — TRANSCRIBE ORDERS (OUTPATIENT)
Dept: PHYSICAL THERAPY | Facility: HOSPITAL | Age: 29
End: 2019-04-10

## 2019-04-10 ENCOUNTER — HOSPITAL ENCOUNTER (OUTPATIENT)
Dept: PHYSICAL THERAPY | Facility: HOSPITAL | Age: 29
Setting detail: THERAPIES SERIES
Discharge: HOME | End: 2019-04-10

## 2019-04-10 DIAGNOSIS — M54.2 CERVICALGIA: Primary | ICD-10-CM

## 2019-04-29 ENCOUNTER — HOSPITAL ENCOUNTER (OUTPATIENT)
Dept: PHYSICAL THERAPY | Facility: HOSPITAL | Age: 29
Setting detail: THERAPIES SERIES
Discharge: HOME | End: 2019-04-29

## 2019-04-29 DIAGNOSIS — M54.2 CERVICALGIA: Primary | ICD-10-CM

## 2019-04-29 DIAGNOSIS — M54.2 NECK PAIN: ICD-10-CM

## 2019-04-29 PROCEDURE — 97161 PT EVAL LOW COMPLEX 20 MIN: CPT | Performed by: PHYSICAL THERAPIST
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
## 2019-05-06 ENCOUNTER — HOSPITAL ENCOUNTER (OUTPATIENT)
Dept: PHYSICAL THERAPY | Facility: HOSPITAL | Age: 29
Setting detail: THERAPIES SERIES
Discharge: HOME | End: 2019-05-06

## 2019-05-06 DIAGNOSIS — M54.2 CERVICALGIA: Primary | ICD-10-CM

## 2019-05-06 DIAGNOSIS — M54.2 NECK PAIN: ICD-10-CM

## 2019-05-06 PROCEDURE — 97140 MANUAL THERAPY 1/> REGIONS: CPT

## 2019-05-06 PROCEDURE — 97110 THERAPEUTIC EXERCISES: CPT

## 2019-05-06 NOTE — THERAPY TREATMENT NOTE
Outpatient Physical Therapy Ortho Treatment Note  [facility]     Patient Name: Elisa Barrett  : 1990  MRN: [id number]  Today's Date: 2019      Visit Date: 2019    Visit Dx:    ICD-10-CM ICD-9-CM   1. Cervicalgia M54.2 723.1   2. Neck pain M54.2 723.1       There is no problem list on file for this patient.       Past Medical History:   Diagnosis Date   • Attention deficit disorder    • Back pain    • Neck pain         No past surgical history on file.                    PT Assessment/Plan     Row Name 19 1358          PT Assessment    Assessment Comments  This is her first visit since the initial evaluation, and therefore no goals were met.  She does have hypomobility in her thoracic spine, which places more stress on her cervical spine and middle thoracic spine.  She rest in a forward shoulder posture in sitting and standing.   -AYALA        PT Plan    PT Plan Comments  We will continue to work to progress her postural awareness and strength.  -AYALA       User Key  (r) = Recorded By, (t) = Taken By, (c) = Cosigned By    Initials Name Provider Type    Navin Solares, MAINOR Physical Therapy Assistant            Exercises     Row Name 19 135             Subjective Comments    Subjective Comments  Patient reports her back still hurts.  She reports the middle of her back hurts more now.   -AYALA         Subjective Pain    Able to rate subjective pain?  yes  -AYALA      Pre-Treatment Pain Level  5  -AYALA      Post-Treatment Pain Level  5  -AYALA         Total Minutes    20368 - PT Therapeutic Exercise Minutes  28  -AYALA      57892 - PT Manual Therapy Minutes  19  -AYALA         Exercise 1    Exercise Name 1  hooklying horizontal abduction   -AYALA      Cueing 1  Verbal;Demo;Tactile  -AYALA      Sets 1  2  -AYALA      Reps 1  10  -AYALA      Additional Comments  red band   -AYALA         Exercise 2    Exercise Name 2  seated UE phasic   -AYALA      Cueing 2  Verbal;Tactile  -AYALA      Sets 2  2  -AYALA      Reps 2  10  -AYALA       Additional Comments  first set with red band, second set without band  -AYALA         Exercise 3    Exercise Name 3  standing posture against 1/2 foam roller   -AYALA      Cueing 3  Verbal;Demo;Tactile  -AYALA      Sets 3  3  -AYALA      Time 3  1 minute holds   -AYALA         Exercise 4    Exercise Name 4  weighed patient per request: 211 pounds   -AYALA        User Key  (r) = Recorded By, (t) = Taken By, (c) = Cosigned By    Initials Name Provider Type    Navin Solares PTA Physical Therapy Assistant                      Manual Rx (last 36 hours)      Manual Treatments     Row Name 05/06/19 1358             Total Minutes    16012 - PT Manual Therapy Minutes  19  -AYALA         Manual Rx 1    Manual Rx 1 Location  prone thoracic and lumbar   -AYALA      Manual Rx 1 Type  IASTM with pink foam roller   -AYALA      Manual Rx 1 Grade  min-mod  -AYALA      Manual Rx 1 Duration  10  -AYALA         Manual Rx 2    Manual Rx 2 Location  prone thoracic  -AYALA      Manual Rx 2 Type  extension mobilizations  -AYALA      Manual Rx 2 Grade  2  -AYALA      Manual Rx 2 Duration  5  -AYALA         Manual Rx 3    Manual Rx 3 Location  cervical distraction   -AYALA      Manual Rx 3 Grade  1-2  -AYALA      Manual Rx 3 Duration  4  -AYALA        User Key  (r) = Recorded By, (t) = Taken By, (c) = Cosigned By    Initials Name Provider Type    Navin Solares, MAINOR Physical Therapy Assistant          PT OP Goals     Row Name 05/06/19 1358          Long Term Goals    LTG Date to Achieve  06/10/19  -AYALA     LTG 1  Pt demonstrates independence with HEP.  -AYALA     LTG 1 Progress  Ongoing  -AYALA     LTG 2  Improve cervical rotation to 65 deg bilaterally.  -AYALA     LTG 2 Progress  Ongoing  -AYALA     LTG 3  Pt will demonstrate ning lower trap, mid trap and rhomboid strength of 4/5.  -AYALA     LTG 3 Progress  Ongoing  -AYALA     LTG 4  Pt will demonstrate improvements in posture and postural awareness with the ability to correct without cues.  -AYALA     LTG 4 Progress  Ongoing  -AYALA     LTG 5  Pt  will reports pain levels of <3/10 for 1 week.  -AYALA     LTG 5 Progress  Ongoing  -AYALA     LTG 5 Progress Comments  5/10 today   -AYALA        Time Calculation    PT Goal Re-Cert Due Date  05/29/19  -AYALA       User Key  (r) = Recorded By, (t) = Taken By, (c) = Cosigned By    Initials Name Provider Type    Navin Solares PTA Physical Therapy Assistant          Therapy Education  Education Details: seated UE phasic   Given: HEP  Program: New  How Provided: Verbal  Provided to: Patient  Level of Understanding: Verbalized              Time Calculation:   Start Time: 1358  Stop Time: 1445  Time Calculation (min): 47 min  Total Timed Code Minutes- PT: 47 minute(s)  Therapy Charges for Today     Code Description Service Date Service Provider Modifiers Qty    76831504587 HC PT THER PROC EA 15 MIN 5/6/2019 Navin Melendez PTA GP 2    83692442321 HC PT MANUAL THERAPY EA 15 MIN 5/6/2019 Navin Melendez PTA GP 1                    Navin Melendez PTA  5/6/2019

## 2019-05-20 ENCOUNTER — HOSPITAL ENCOUNTER (OUTPATIENT)
Dept: PHYSICAL THERAPY | Facility: HOSPITAL | Age: 29
Setting detail: THERAPIES SERIES
Discharge: HOME | End: 2019-05-20

## 2019-05-20 DIAGNOSIS — M54.2 NECK PAIN: ICD-10-CM

## 2019-05-20 DIAGNOSIS — M54.2 CERVICALGIA: Primary | ICD-10-CM

## 2019-05-20 PROCEDURE — 97140 MANUAL THERAPY 1/> REGIONS: CPT

## 2019-05-20 PROCEDURE — 97110 THERAPEUTIC EXERCISES: CPT

## 2019-05-20 NOTE — THERAPY TREATMENT NOTE
Outpatient Physical Therapy Ortho Treatment Note  [facility]     Patient Name: Elisa Barrett  : 1990  MRN: [id number]  Today's Date: 2019      Visit Date: 2019    Visit Dx:    ICD-10-CM ICD-9-CM   1. Cervicalgia M54.2 723.1   2. Neck pain M54.2 723.1       There is no problem list on file for this patient.       Past Medical History:   Diagnosis Date   • Attention deficit disorder    • Back pain    • Neck pain         No past surgical history on file.                    PT Assessment/Plan     Row Name 19 1118          PT Assessment    Assessment Comments  Patient has decreased cervical rotation bilaterally, but she lacks more motion towards the left with pain at end range.  Her postural faults continue to increased unwanted stress onto her cervical and middle thoracic spine, which is the locations she is experiencing pain.  She lacks postural strengthening needed for endurance, and therefore she is unable to maintain corrected posture at this time.   -AYALA        PT Plan    PT Plan Comments  Continue to improve postural strength and endurance.  -AYALA       User Key  (r) = Recorded By, (t) = Taken By, (c) = Cosigned By    Initials Name Provider Type    Navin Solares, PTA Physical Therapy Assistant            Exercises     Row Name 19 1118             Subjective Comments    Subjective Comments  Patient reports she doesn't feel well today.  She continues to have pain in the neck and into the middle back.  -AYALA         Subjective Pain    Able to rate subjective pain?  yes  -AYALA      Pre-Treatment Pain Level  5  -AYALA      Post-Treatment Pain Level  0  -AYALA         Total Minutes    01309 - PT Therapeutic Exercise Minutes  32  -AYALA      78150 - PT Manual Therapy Minutes  10  -AYALA         Exercise 1    Exercise Name 1  hooklying thoracic stretch on / foam roller   -AYALA      Time 1  2 minutes   -AYALA         Exercise 2    Exercise Name 2  hooklying horizontal abduction   -AYALA      Cueing 2   Verbal;Tactile  -AYALA      Sets 2  3  -AYALA      Reps 2  10  -AYALA      Additional Comments  red band  -AYALA         Exercise 3    Exercise Name 3  hooklying SA punch   -AYALA      Cueing 3  Demo;Tactile;Verbal  -AYALA      Sets 3  3  -AYALA      Reps 3  10  -AYALA      Additional Comments  each arm, added to HEP  -AYALA        User Key  (r) = Recorded By, (t) = Taken By, (c) = Cosigned By    Initials Name Provider Type    Navin Solares, MAINOR Physical Therapy Assistant                      Manual Rx (last 36 hours)      Manual Treatments     Row Name 05/20/19 1118             Total Minutes    48491 - PT Manual Therapy Minutes  10  -AYALA         Manual Rx 1    Manual Rx 1 Location  UT,LS, cervical paraspinals  in supine   -AYALA      Manual Rx 1 Type  STM with free up   -AYALA      Manual Rx 1 Grade  min-mod   -AYALA      Manual Rx 1 Duration  10  -AYALA        User Key  (r) = Recorded By, (t) = Taken By, (c) = Cosigned By    Initials Name Provider Type    Navin Solares, PTA Physical Therapy Assistant          PT OP Goals     Row Name 05/20/19 1118          PT Short Term Goals    STG Date to Achieve  --  -AYALA     STG 1  --  -AYALA     STG 1 Progress  --  -AYALA     STG 2  --  -AYALA     STG 2 Progress  --  -AYALA        Long Term Goals    LTG Date to Achieve  06/10/19  -AYALA     LTG 1  Pt demonstrates independence with HEP.  -AYALA     LTG 1 Progress  Ongoing  -AYALA     LTG 2  Improve cervical rotation to 65 deg bilaterally.  -AYALA     LTG 2 Progress  Ongoing  -AYLAA     LTG 2 Progress Comments  (L) 42 degrees with 6/10 pain at end range  (R) 52 degrees   -AYALA     LTG 3  Pt will demonstrate ning lower trap, mid trap and rhomboid strength of 4/5.  -AYALA     LTG 3 Progress  Ongoing  -AYALA     LTG 4  Pt will demonstrate improvements in posture and postural awareness with the ability to correct without cues.  -AYALA     LTG 4 Progress  Ongoing  -AYALA     LTG 5  Pt will reports pain levels of <3/10 for 1 week.  -AYALA     LTG 5 Progress  Ongoing  -AYALA        Time Calculation    PT  Goal Re-Cert Due Date  05/29/19  -AYALA       User Key  (r) = Recorded By, (t) = Taken By, (c) = Cosigned By    Initials Name Provider Type    Navin Solares PTA Physical Therapy Assistant          Therapy Education  Education Details: SA punch 3x 10 reps every day   Given: HEP  Program: New  How Provided: Verbal, Demonstration, Written  Provided to: Patient  Level of Understanding: Verbalized, Demonstrated              Time Calculation:   Start Time: 1118  Stop Time: 1200  Time Calculation (min): 42 min  Total Timed Code Minutes- PT: 42 minute(s)  Therapy Charges for Today     Code Description Service Date Service Provider Modifiers Qty    60510826297 HC PT THER PROC EA 15 MIN 5/20/2019 Navin Melendez PTA GP 2    39127016965 HC PT MANUAL THERAPY EA 15 MIN 5/20/2019 Navin Melendez PTA GP 1                    Navin Melendez PTA  5/20/2019

## 2019-05-29 ENCOUNTER — APPOINTMENT (OUTPATIENT)
Dept: PHYSICAL THERAPY | Facility: HOSPITAL | Age: 29
End: 2019-05-29

## 2019-06-03 ENCOUNTER — HOSPITAL ENCOUNTER (OUTPATIENT)
Dept: PHYSICAL THERAPY | Facility: HOSPITAL | Age: 29
Setting detail: THERAPIES SERIES
Discharge: HOME | End: 2019-06-03

## 2019-06-03 DIAGNOSIS — M54.2 CERVICALGIA: Primary | ICD-10-CM

## 2019-06-03 PROCEDURE — 97140 MANUAL THERAPY 1/> REGIONS: CPT

## 2019-06-03 PROCEDURE — 97110 THERAPEUTIC EXERCISES: CPT

## 2019-06-03 NOTE — THERAPY PROGRESS REPORT/RE-CERT
Outpatient Physical Therapy Ortho Treatment Note  [facility]     Patient Name: Elisa Barrett  : 1990  MRN: [id number]  Today's Date: 6/3/2019      Visit Date: 2019    Visit Dx:    ICD-10-CM ICD-9-CM   1. Cervicalgia M54.2 723.1       There is no problem list on file for this patient.       Past Medical History:   Diagnosis Date   • Attention deficit disorder    • Back pain    • Neck pain         No past surgical history on file.                    PT Assessment/Plan     Row Name 19 1306          PT Assessment    Functional Limitations  Limitations in community activities;Limitations in functional capacity and performance;Limitation in home management;Performance in self-care ADL  -WJ     Impairments  Range of motion;Pain;Joint mobility;Posture  -WJ     Assessment Comments  Pt reports feeling 25% improved since starting therapy. Pt has not acheived any goals at this time but is making progress towards most including good progress with her ROM and thoracic and cervical mobility. She has only attended 3 visits since inital evaluation so her progress has been slow. We reviewed attendance policy today and pt reports that she will be more compliant. She willc ontinue to beenfit from therapy to improve scapular and postural strength as she continues to be weak and requires increased cueing for correct posture.   -TR     Rehab Potential  Good  -WJ     Patient/caregiver participated in establishment of treatment plan and goals  Yes  -WJ     Patient would benefit from skilled therapy intervention  Yes  -WJ        PT Plan    PT Frequency  1x/week;2x/week  -WJ     Predicted Duration of Therapy Intervention (Therapy Eval)  4 weeks  -WJ     Planned CPT's?  PT RE-EVAL: 34116;PT THER PROC EA 15 MIN: 47470;PT THER ACT EA 15 MIN: 23488;PT MANUAL THERAPY EA 15 MIN: 49975;PT NEUROMUSC RE-EDUCATION EA 15 MIN: 00774;PT SELF CARE/HOME MGMT/TRAIN EA 15: 91717;PT HOT OR COLD PACK TREAT MCARE;PT ELECTRICAL STIM  UNATTEND: ;PT ELECTRICAL STIM ATTD EA 15 MIN: 26708  -WJ     PT Plan Comments  Continue to work on mobility as needed and progress with strengthening of postural and scapular muscles.   -TR       User Key  (r) = Recorded By, (t) = Taken By, (c) = Cosigned By    Initials Name Provider Type    TR Chastity Jean Baptiste PTA Physical Therapy Assistant    WChip Mon, PT DPT Physical Therapist            Exercises     Row Name 06/03/19 1306             Subjective Comments    Subjective Comments  Pt reports   -TR         Subjective Pain    Able to rate subjective pain?  yes  -TR      Pre-Treatment Pain Level  5  -TR      Subjective Pain Comment  lower back, neck 1/10   -TR         Total Minutes    94066 - PT Therapeutic Exercise Minutes  16  -TR      61884 - PT Manual Therapy Minutes  23  -TR         Exercise 1    Exercise Name 1  Addressed all goals for progress not e  -TR         Exercise 2    Exercise Name 2  B SA punches with 1# weight   -TR      Cueing 2  Verbal;Tactile  -TR      Sets 2  2  -TR      Reps 2  10  -TR        User Key  (r) = Recorded By, (t) = Taken By, (c) = Cosigned By    Initials Name Provider Type    Chastity Ramirez PTA Physical Therapy Assistant                      Manual Rx (last 36 hours)      Manual Treatments     Row Name 06/03/19 1306             Total Minutes    32371 - PT Manual Therapy Minutes  23  -TR         Manual Rx 1    Manual Rx 1 Location  prone thoracic and lumbar   -TR      Manual Rx 1 Type  IASTM with blue foam roller   -TR      Manual Rx 1 Grade  min-mod  -TR      Manual Rx 1 Duration  10  -TR         Manual Rx 2    Manual Rx 2 Location  prone thoracic  -TR      Manual Rx 2 Type  extension mobilizations  -TR      Manual Rx 2 Grade  2  -TR      Manual Rx 2 Duration  5  -TR         Manual Rx 3    Manual Rx 3 Location  cervical distraction   -TR      Manual Rx 3 Grade  1-2  -TR      Manual Rx 3 Duration  8  -TR        User Key  (r) = Recorded By, (t) = Taken By,  (c) = Cosigned By    Initials Name Provider Type    Chastity Ramirez PTA Physical Therapy Assistant          PT OP Goals     Row Name 06/03/19 1306          Long Term Goals    LTG Date to Achieve  06/10/19  -TR     LTG 1  Pt demonstrates independence with HEP.  -TR     LTG 1 Progress  Ongoing  -TR     LTG 1 Progress Comments  demonstrated all components and report complaince daily   -TR     LTG 2  Improve cervical rotation to 65 deg bilaterally.  -TR     LTG 2 Progress  Ongoing  -TR     LTG 2 Progress Comments  (L) 46 degrees with 5/10 pain, (R) 59 degrees with no pain   -TR     LTG 3  Pt will demonstrate ning lower trap, mid trap and rhomboid strength of 4/5.  -TR     LTG 3 Progress  Ongoing  -TR     LTG 3 Progress Comments  R mid trap and rhomboids 4-/5, R lower trap 3+/5, L mid trap and rhomboids 3+/5, L lower trap 3/5   -TR     LTG 4  Pt will demonstrate improvements in posture and postural awareness with the ability to correct without cues.  -TR     LTG 4 Progress  Ongoing  -TR     LTG 4 Progress Comments  COntinues to require cues for posture in sitting and standing   -TR     LTG 5  Pt will reports pain levels of <3/10 for 1 week.  -TR     LTG 5 Progress  Ongoing  -TR     LTG 5 Progress Comments  4/10 on average   -TR     LTG 6     -TR        Time Calculation    PT Goal Re-Cert Due Date  07/03/19  -TR       User Key  (r) = Recorded By, (t) = Taken By, (c) = Cosigned By    Initials Name Provider Type    Chastity Ramirez PTA Physical Therapy Assistant          Therapy Education  Given: HEP  Program: Reinforced  How Provided: Verbal  Provided to: Patient  Level of Understanding: Verbalized              Time Calculation:   Start Time: 1306  Stop Time: 1345  Time Calculation (min): 39 min  Total Timed Code Minutes- PT: 39 minute(s)                Chip Ford, PT DPT  6/3/2019

## 2019-06-10 ENCOUNTER — APPOINTMENT (OUTPATIENT)
Dept: PHYSICAL THERAPY | Facility: HOSPITAL | Age: 29
End: 2019-06-10

## 2019-06-12 ENCOUNTER — APPOINTMENT (OUTPATIENT)
Dept: PHYSICAL THERAPY | Facility: HOSPITAL | Age: 29
End: 2019-06-12

## 2019-06-17 ENCOUNTER — HOSPITAL ENCOUNTER (OUTPATIENT)
Dept: PHYSICAL THERAPY | Facility: HOSPITAL | Age: 29
Setting detail: THERAPIES SERIES
Discharge: HOME | End: 2019-06-17

## 2019-06-17 DIAGNOSIS — M54.2 NECK PAIN: ICD-10-CM

## 2019-06-17 DIAGNOSIS — M54.2 CERVICALGIA: Primary | ICD-10-CM

## 2019-06-17 PROCEDURE — 97110 THERAPEUTIC EXERCISES: CPT | Performed by: PHYSICAL THERAPIST

## 2019-06-17 PROCEDURE — 97140 MANUAL THERAPY 1/> REGIONS: CPT | Performed by: PHYSICAL THERAPIST

## 2019-06-17 NOTE — THERAPY TREATMENT NOTE
Outpatient Physical Therapy Ortho Treatment Note  [facility]     Patient Name: Elisa Barrett  : 1990  MRN: [id number]  Today's Date: 2019      Visit Date: 2019    Visit Dx:    ICD-10-CM ICD-9-CM   1. Cervicalgia M54.2 723.1   2. Neck pain M54.2 723.1       There is no problem list on file for this patient.       Past Medical History:   Diagnosis Date   • Attention deficit disorder    • Back pain    • Neck pain         No past surgical history on file.                    PT Assessment/Plan     Row Name 19 1300          PT Assessment    Assessment Comments  Pt was tired today and reported a slight increase in pain. She demonstrated increased dififuclty with prone Y, T and I's reporitng that they were difficult. Verbal and tactile cues were required for correct form. She fatigues quickly during postural strengthenig exercises.  -WJ        PT Plan    PT Plan Comments  Continue to work on her spinal mobility and progress with postural strengthening as tolerated.  -WJ       User Key  (r) = Recorded By, (t) = Taken By, (c) = Cosigned By    Initials Name Provider Type    WJ Chip Ford, PT DPT Physical Therapist            Exercises     Row Name 19 1300             Subjective Comments    Subjective Comments  Pt reports that she is doing well since her last visit.  -WJ         Subjective Pain    Able to rate subjective pain?  yes  -WJ      Pre-Treatment Pain Level  6  -WJ      Subjective Pain Comment  neck, shooulders  -WJ         Total Minutes    84878 - PT Therapeutic Exercise Minutes  22  -WJ      24027 - PT Manual Therapy Minutes  20  -WJ         Exercise 1    Exercise Name 1  prone Y  -WJ      Cueing 1  Verbal;Tactile  -WJ      Sets 1  1  -WJ      Reps 1  10  -WJ      Additional Comments  each side  -WJ         Exercise 2    Exercise Name 2  prone T  -WJ      Cueing 2  Verbal;Tactile  -WJ      Sets 2  1  -WJ      Reps 2  10  -WJ      Additional Comments  each side  -WJ          Exercise 3    Exercise Name 3  prone I  -WJ      Cueing 3  Verbal;Tactile  -WJ      Sets 3  1  -WJ      Reps 3  10  -WJ      Additional Comments  each side  -WJ         Exercise 4    Exercise Name 4  standing scapular rows with RTB  -WJ      Cueing 4  Verbal;Tactile;Demo  -WJ      Sets 4  2  -WJ      Reps 4  10  -WJ         Exercise 5    Exercise Name 5  standing W's with RTB  -WJ      Cueing 5  Verbal;Demo  -WJ      Sets 5  2  -WJ      Reps 5  10  -WJ        User Key  (r) = Recorded By, (t) = Taken By, (c) = Cosigned By    Initials Name Provider Type    Chip Lorenz, PT DPT Physical Therapist                      Manual Rx (last 36 hours)      Manual Treatments     Row Name 06/17/19 1300             Total Minutes    68181 - PT Manual Therapy Minutes  20  -WJ         Manual Rx 1    Manual Rx 1 Location  thoracic spine  -WJ      Manual Rx 1 Type  IASTM with blue foam roller   -WJ      Manual Rx 1 Grade  mod  -WJ      Manual Rx 1 Duration  10  -WJ         Manual Rx 2    Manual Rx 2 Location  prone thoracic  -WJ      Manual Rx 2 Type  extension mobilizations  -WJ      Manual Rx 2 Grade  2  -WJ      Manual Rx 2 Duration  5  -WJ         Manual Rx 3    Manual Rx 3 Location  pront UT  -WJ      Manual Rx 3 Type  STM  -WJ      Manual Rx 3 Grade  mod  -WJ      Manual Rx 3 Duration  5  -WJ        User Key  (r) = Recorded By, (t) = Taken By, (c) = Cosigned By    Initials Name Provider Type    AKUAChip Mon, PT DPT Physical Therapist          PT OP Goals     Row Name 06/17/19 1300          Long Term Goals    LTG Date to Achieve  06/10/19  -WJ     LTG 1  Pt demonstrates independence with HEP.  -WJ     LTG 1 Progress  Ongoing  -WJ     LTG 2  Improve cervical rotation to 65 deg bilaterally.  -WJ     LTG 2 Progress  Ongoing  -WJ     LTG 3  Pt will demonstrate ning lower trap, mid trap and rhomboid strength of 4/5.  -WJ     LTG 3 Progress  Ongoing  -WJ     LTG 3 Progress Comments  Pt had difficulty with prone Y, I, T today   -WJ     LTG 4  Pt will demonstrate improvements in posture and postural awareness with the ability to correct without cues.  -WJ     LTG 4 Progress  Ongoing  -WJ     LTG 5  Pt will reports pain levels of <3/10 for 1 week.  -WJ     LTG 5 Progress  Ongoing  -W     LTG 6     -WJ        Time Calculation    PT Goal Re-Cert Due Date  07/03/19  -WJ       User Key  (r) = Recorded By, (t) = Taken By, (c) = Cosigned By    Initials Name Provider Type    Chip Lorenz, PT DPT Physical Therapist          Therapy Education  Education Details: added rows with RTB  Given: HEP  Program: Reinforced, New  How Provided: Verbal  Provided to: Patient  Level of Understanding: Verbalized              Time Calculation:   Start Time: 1306  Stop Time: 1348  Time Calculation (min): 42 min  Total Timed Code Minutes- PT: 42 minute(s)  Therapy Charges for Today     Code Description Service Date Service Provider Modifiers Qty    93561204285  PT THER PROC EA 15 MIN 6/17/2019 Chip Ford, PT DPT GP 2    [id number]  PT MANUAL THERAPY EA 15 MIN 6/17/2019 Chip Ford PT DPT GP 1                    NALLELY ArzateT  6/17/2019

## 2019-06-19 ENCOUNTER — HOSPITAL ENCOUNTER (OUTPATIENT)
Dept: PHYSICAL THERAPY | Facility: HOSPITAL | Age: 29
Setting detail: THERAPIES SERIES
Discharge: HOME | End: 2019-06-19

## 2019-06-19 DIAGNOSIS — M54.2 CERVICALGIA: Primary | ICD-10-CM

## 2019-06-19 DIAGNOSIS — M54.2 NECK PAIN: ICD-10-CM

## 2019-06-19 PROCEDURE — 97140 MANUAL THERAPY 1/> REGIONS: CPT

## 2019-06-19 PROCEDURE — 97110 THERAPEUTIC EXERCISES: CPT

## 2019-06-19 NOTE — THERAPY TREATMENT NOTE
Outpatient Physical Therapy Ortho Treatment Note  [facility]     Patient Name: Elisa Barrett  : 1990  MRN: [id number]  Today's Date: 2019      Visit Date: 2019    Visit Dx:    ICD-10-CM ICD-9-CM   1. Cervicalgia M54.2 723.1   2. Neck pain M54.2 723.1       There is no problem list on file for this patient.       Past Medical History:   Diagnosis Date   • Attention deficit disorder    • Back pain    • Neck pain         No past surgical history on file.                    PT Assessment/Plan     Row Name 19 1320          PT Assessment    Assessment Comments  Pt reports that her low back is hurting today and requested to not be placed on her stomach. We did continue to work on thoracic and cervical mobility which is improving. We continued with scpaualr strengthening and pt continues to fatigue quickly and require several rest breaks.   -TR        PT Plan    PT Plan Comments  COntinue to work on scapular strengthening and bolster HEP.   -TR       User Key  (r) = Recorded By, (t) = Taken By, (c) = Cosigned By    Initials Name Provider Type    TR Chastity Jean Baptiste, MAINOR Physical Therapy Assistant            Exercises     Row Name 19 1320             Subjective Comments    Subjective Comments  Pt denies any changes, she was last   -TR         Subjective Pain    Able to rate subjective pain?  yes  -TR      Pre-Treatment Pain Level  5  -TR      Subjective Pain Comment  Neck and shoulders   -TR         Total Minutes    15451 - PT Therapeutic Exercise Minutes  20  -TR      50521 - PT Manual Therapy Minutes  25  -TR         Exercise 1    Exercise Name 1  Seated scapular retraction against red band   -TR      Cueing 1  Verbal  -TR      Sets 1  3  -TR      Reps 1  15  -TR         Exercise 2    Exercise Name 2  Seated W's against red band   -TR      Cueing 2  Verbal  -TR      Sets 2  2  -TR      Reps 2  15  -TR         Exercise 3    Exercise Name 3  Seated SA punches against red band   -TR       Cueing 3  Verbal  -TR      Sets 3  2  -TR      Reps 3  10  -TR      Additional Comments  BUE   -TR        User Key  (r) = Recorded By, (t) = Taken By, (c) = Cosigned By    Initials Name Provider Type    Chastity Ramirez PTA Physical Therapy Assistant                      Manual Rx (last 36 hours)      Manual Treatments     Row Name 06/19/19 1320             Total Minutes    95075 - PT Manual Therapy Minutes  25  -TR         Manual Rx 1    Manual Rx 1 Location  thoracic spine  -TR      Manual Rx 1 Type  IASTM with blue foam roller in massage chair   -TR      Manual Rx 1 Grade  mod  -TR      Manual Rx 1 Duration  10  -TR         Manual Rx 2    Manual Rx 2 Location  prone thoracic  -TR      Manual Rx 2 Type  extension mobilizations in massage chair   -TR      Manual Rx 2 Grade  2  -TR      Manual Rx 2 Duration  10  -TR         Manual Rx 3    Manual Rx 3 Location  cervical   -TR      Manual Rx 3 Type  distraction   -TR      Manual Rx 3 Grade  mod  -TR      Manual Rx 3 Duration  5  -TR        User Key  (r) = Recorded By, (t) = Taken By, (c) = Cosigned By    Initials Name Provider Type    Chastity Ramirez PTA Physical Therapy Assistant          PT OP Goals     Row Name 06/19/19 1320          Long Term Goals    LTG Date to Achieve  06/10/19  -TR     LTG 1  Pt demonstrates independence with HEP.  -TR     LTG 1 Progress  Ongoing  -TR     LTG 2  Improve cervical rotation to 65 deg bilaterally.  -TR     LTG 2 Progress  Ongoing  -TR     LTG 3  Pt will demonstrate ning lower trap, mid trap and rhomboid strength of 4/5.  -TR     LTG 3 Progress  Ongoing  -TR     LTG 3 Progress Comments  Progressed with strengthening today   -TR     LTG 4  Pt will demonstrate improvements in posture and postural awareness with the ability to correct without cues.  -TR     LTG 4 Progress  Ongoing  -TR     LTG 5  Pt will reports pain levels of <3/10 for 1 week.  -TR     LTG 5 Progress  Ongoing  -TR     LTG 6     -TR        Time  Calculation    PT Goal Re-Cert Due Date  07/03/19  -MONTY       User Key  (r) = Recorded By, (t) = Taken By, (c) = Cosigned By    Initials Name Provider Type    Chastity Ramirez PTA Physical Therapy Assistant          Therapy Education  Given: HEP  Program: Reinforced  How Provided: Verbal  Provided to: Patient  Level of Understanding: Verbalized              Time Calculation:   Start Time: 1320  Stop Time: 1400  Time Calculation (min): 40 min  Total Timed Code Minutes- PT: 40 minute(s)  Therapy Charges for Today     Code Description Service Date Service Provider Modifiers Qty    79953267769 HC PT THER PROC EA 15 MIN 6/19/2019 Chastity Jean Baptiste PTA GP 1    33907830574 HC PT MANUAL THERAPY EA 15 MIN 6/19/2019 Chastity Jean Baptiste PTA GP 2                    Chastity Jean Baptiste PTA  6/19/2019

## 2019-06-21 ENCOUNTER — HOSPITAL ENCOUNTER (EMERGENCY)
Facility: HOSPITAL | Age: 29
Discharge: HOME | End: 2019-06-22
Attending: EMERGENCY MEDICINE | Admitting: EMERGENCY MEDICINE

## 2019-06-21 DIAGNOSIS — N39.0 ACUTE URINARY TRACT INFECTION: Primary | ICD-10-CM

## 2019-06-21 LAB
B-HCG UR QL: NEGATIVE
BACTERIA UR QL AUTO: ABNORMAL /HPF
BILIRUB UR QL STRIP: NEGATIVE
CLARITY UR: CLEAR
COLOR UR: YELLOW
GLUCOSE UR STRIP-MCNC: NEGATIVE MG/DL
HGB UR QL STRIP.AUTO: ABNORMAL
HYALINE CASTS UR QL AUTO: ABNORMAL /LPF
KETONES UR QL STRIP: ABNORMAL
LEUKOCYTE ESTERASE UR QL STRIP.AUTO: ABNORMAL
NITRITE UR QL STRIP: NEGATIVE
PH UR STRIP.AUTO: 6 [PH] (ref 5–8)
PROT UR QL STRIP: NEGATIVE
RBC # UR: ABNORMAL /HPF
REF LAB TEST METHOD: ABNORMAL
SP GR UR STRIP: >1.03 (ref 1–1.03)
SQUAMOUS #/AREA URNS HPF: ABNORMAL /HPF
UROBILINOGEN UR QL STRIP: ABNORMAL
WBC UR QL AUTO: ABNORMAL /HPF

## 2019-06-21 PROCEDURE — 99283 EMERGENCY DEPT VISIT LOW MDM: CPT

## 2019-06-21 PROCEDURE — 81025 URINE PREGNANCY TEST: CPT | Performed by: EMERGENCY MEDICINE

## 2019-06-21 PROCEDURE — 81001 URINALYSIS AUTO W/SCOPE: CPT | Performed by: EMERGENCY MEDICINE

## 2019-06-22 VITALS
TEMPERATURE: 98.3 F | HEIGHT: 65 IN | SYSTOLIC BLOOD PRESSURE: 131 MMHG | DIASTOLIC BLOOD PRESSURE: 65 MMHG | HEART RATE: 71 BPM | WEIGHT: 212 LBS | RESPIRATION RATE: 18 BRPM | OXYGEN SATURATION: 98 % | BODY MASS INDEX: 35.32 KG/M2

## 2019-06-22 RX ORDER — NITROFURANTOIN 25; 75 MG/1; MG/1
100 CAPSULE ORAL 2 TIMES DAILY
Qty: 14 CAPSULE | Refills: 0 | OUTPATIENT
Start: 2019-06-22 | End: 2020-09-06 | Stop reason: HOSPADM

## 2019-06-28 ENCOUNTER — APPOINTMENT (OUTPATIENT)
Dept: PHYSICAL THERAPY | Facility: HOSPITAL | Age: 29
End: 2019-06-28

## 2019-07-05 ENCOUNTER — HOSPITAL ENCOUNTER (OUTPATIENT)
Dept: PHYSICAL THERAPY | Facility: HOSPITAL | Age: 29
Setting detail: THERAPIES SERIES
Discharge: HOME | End: 2019-07-05

## 2019-07-05 DIAGNOSIS — M54.2 CERVICALGIA: Primary | ICD-10-CM

## 2019-07-05 DIAGNOSIS — M54.2 NECK PAIN: ICD-10-CM

## 2019-07-05 PROCEDURE — 97110 THERAPEUTIC EXERCISES: CPT | Performed by: PHYSICAL THERAPIST

## 2019-07-05 PROCEDURE — 97140 MANUAL THERAPY 1/> REGIONS: CPT | Performed by: PHYSICAL THERAPIST

## 2019-07-05 NOTE — THERAPY PROGRESS REPORT/RE-CERT
Outpatient Physical Therapy Ortho Progress Note  [facility]     Patient Name: Elisa Barrett  : 1990  MRN: [id number]  Today's Date: 2019      Visit Date: 2019    Visit Dx:    ICD-10-CM ICD-9-CM   1. Cervicalgia M54.2 723.1   2. Neck pain M54.2 723.1       There is no problem list on file for this patient.       Past Medical History:   Diagnosis Date   • Attention deficit disorder    • Back pain    • Neck pain         No past surgical history on file.                    PT Assessment/Plan     Row Name 19 1300          PT Assessment    Functional Limitations  Limitations in community activities;Limitations in functional capacity and performance;Limitation in home management;Performance in self-care ADL  -WJ     Impairments  Range of motion;Pain;Joint mobility;Posture  -WJ     Assessment Comments  Elisa has partially her cervical ROM goal at this time with increased rotation to the R. She continues to dmeonstrate forward flexed posture and postural weakness with the most diffiuclty performing prone scapular strengthening. Her muscl endurance is poor and she fatigues easily. We will continue to work on her postursal strength and endurance to decrease the strain on the cervical spine.  -WJ     Rehab Potential  Good  -WJ     Patient/caregiver participated in establishment of treatment plan and goals  Yes  -WJ     Patient would benefit from skilled therapy intervention  Yes  -WJ        PT Plan    PT Frequency  1x/week;2x/week  -WJ     Predicted Duration of Therapy Intervention (Therapy Eval)  3-4 weeks  -WJ     Planned CPT's?  PT RE-EVAL: 43295;PT THER PROC EA 15 MIN: 62371;PT THER ACT EA 15 MIN: 71354;PT MANUAL THERAPY EA 15 MIN: 57812;PT NEUROMUSC RE-EDUCATION EA 15 MIN: 59443;PT SELF CARE/HOME MGMT/TRAIN EA 15: 81328;PT ELECTRICAL STIM UNATTEND: ;PT ELECTRICAL STIM ATTD EA 15 MIN: 87809;PT HOT/COLD PACK WC NONMCARE: 45116  -WJ     PT Plan Comments  Cotninue to progress with scapular  strengthening and endurance as she dmeonstrates fatigue quickly with exercises.  -WJ       User Key  (r) = Recorded By, (t) = Taken By, (c) = Cosigned By    Initials Name Provider Type    Chip Lorenz, NALLELY DPT Physical Therapist            Exercises     Row Name 07/05/19 1400 07/05/19 1300          Subjective Comments    Subjective Comments  Pt reports no new changes since last session.  -WJ  --        Subjective Pain    Able to rate subjective pain?  yes  -WJ  --     Pre-Treatment Pain Level  6  -WJ  --        Total Minutes    31627 - PT Therapeutic Exercise Minutes  --  25  -WJ     77166 - PT Manual Therapy Minutes  --  15  -WJ        Exercise 1    Exercise Name 1  addressed all goals for progress note   -WJ  --        Exercise 2    Exercise Name 2  prone Y   -WJ  --     Cueing 2  Verbal;Tactile  -WJ  --     Sets 2  1  -WJ  --     Reps 2  10  -WJ  --        Exercise 3    Exercise Name 3  Prone T  -WJ  --     Cueing 3  Verbal;Tactile  -WJ  --     Sets 3  1  -WJ  --     Reps 3  10  -WJ  --        Exercise 4    Exercise Name 4  UE diagonal with RTB  -WJ  --     Cueing 4  Verbal;Tactile  -WJ  --     Sets 4  1  -WJ  --     Reps 4  10  -WJ  --        Exercise 5    Exercise Name 5  horizontal abduction with RTB  -WJ  --     Cueing 5  Verbal  -WJ  --     Sets 5  1  -WJ  --     Reps 5  10  -WJ  --        Exercise 6    Exercise Name 6  scapular rows with RTB  -WJ  --     Cueing 6  Verbal;Tactile  -WJ  --     Sets 6  2  -WJ  --     Reps 6  10  -WJ  --       User Key  (r) = Recorded By, (t) = Taken By, (c) = Cosigned By    Initials Name Provider Type    Chip Lorenz PT DPT Physical Therapist                      Manual Rx (last 36 hours)      Manual Treatments     Row Name 07/05/19 1300             Total Minutes    45325 - PT Manual Therapy Minutes  15  -WJ         Manual Rx 1    Manual Rx 1 Location  thoracic spine  -WJ      Manual Rx 1 Type  iASTM with blue foam roller  -WJ      Manual Rx 1 Grade  mod  -WJ       Manual Rx 1 Duration  10  -WJ         Manual Rx 2    Manual Rx 2 Location  prone thoracic  -WJ      Manual Rx 2 Type  extension mobilizations  -WJ      Manual Rx 2 Grade  2  -WJ      Manual Rx 2 Duration  5  -WJ        User Key  (r) = Recorded By, (t) = Taken By, (c) = Cosigned By    Initials Name Provider Type    Chip Lorenz, PT DPT Physical Therapist          PT OP Goals     Row Name 07/05/19 1400          Long Term Goals    LTG Date to Achieve  06/10/19  -WJ     LTG 1  Pt demonstrates independence with HEP.  -WJ     LTG 1 Progress  Ongoing  -WJ     LTG 1 Progress Comments  pt reports compliance  -WJ     LTG 2  Improve cervical rotation to 65 deg bilaterally.  -WJ     LTG 2 Progress  Partially Met;Ongoing  -WJ     LTG 2 Progress Comments  57 to the L, 70 to the R  -WJ     LTG 3  Pt will demonstrate ning lower trap, mid trap and rhomboid strength of 4/5.  -WJ     LTG 3 Progress  Ongoing  -WJ     LTG 3 Progress Comments  3+/5  -WJ     LTG 4  Pt will demonstrate improvements in posture and postural awareness with the ability to correct without cues.  -WJ     LTG 4 Progress  Ongoing  -WJ     LTG 4 Progress Comments  Continues to demonstrate forward flexed posture  -WJ     LTG 5  Pt will reports pain levels of <3/10 for 1 week.  -WJ     LTG 5 Progress  Ongoing  -WJ     LTG 5 Progress Comments  6/10 today  -WJ     LTG 6     -WJ        Time Calculation    PT Goal Re-Cert Due Date  08/04/19  -WJ       User Key  (r) = Recorded By, (t) = Taken By, (c) = Cosigned By    Initials Name Provider Type    Chip Lorenz PT DPT Physical Therapist          Therapy Education  Education Details: added D2 diagonal with RTB   Given: HEP  Program: Reinforced, New  How Provided: Verbal  Provided to: Patient  Level of Understanding: Verbalized              Time Calculation:   Start Time: 1352  Stop Time: 1432  Time Calculation (min): 40 min  Total Timed Code Minutes- PT: 40 minute(s)  Therapy Charges for Today     Code  Description Service Date Service Provider Modifiers Qty    39798608836 HC PT THER PROC EA 15 MIN 7/5/2019 Chip Ford, PT DPT GP 2    09915416394 HC PT MANUAL THERAPY EA 15 MIN 7/5/2019 Chip Ford, PT DPT GP 1                    Chip Ford, PT DPT  7/5/2019

## 2019-07-12 ENCOUNTER — HOSPITAL ENCOUNTER (OUTPATIENT)
Dept: PHYSICAL THERAPY | Facility: HOSPITAL | Age: 29
Setting detail: THERAPIES SERIES
Discharge: HOME | End: 2019-07-12

## 2019-07-12 DIAGNOSIS — M54.2 NECK PAIN: ICD-10-CM

## 2019-07-12 DIAGNOSIS — M54.2 CERVICALGIA: Primary | ICD-10-CM

## 2019-07-12 PROCEDURE — 97110 THERAPEUTIC EXERCISES: CPT

## 2019-07-12 NOTE — THERAPY DISCHARGE NOTE
Outpatient Physical Therapy Ortho Treatment Note/Discharge Summary  [facility]     Patient Name: Elisa Barrett  : 1990  MRN: [id number]  Today's Date: 2019      Visit Date: 2019    Visit Dx:    ICD-10-CM ICD-9-CM   1. Cervicalgia M54.2 723.1   2. Neck pain M54.2 723.1       There is no problem list on file for this patient.       Past Medical History:   Diagnosis Date   • Attention deficit disorder    • Back pain    • Neck pain         No past surgical history on file.                    PT Assessment/Plan     Row Name 19 [id number]          PT Assessment    Assessment Comments  Elisa reports she has been pain free all week, and she reported she was 100% since the start of therapy.  She continues to have scapular weakness, but her strength can be improved independently as long as she is compliant with HEP.  She voiced that she is ready to be discharged and work on her HEP.  Therefore, her HEP was reviewed today in depth with a new print out for patient to follow.  Her postural awareness has improved, but she continues to rest in poor posture.  However, she is able to correct without cueing. I believe she will continue to improve as long as she is compliant with HEP.  -AYALA        PT Plan    PT Plan Comments  D/C to HEP   -AYALA       User Key  (r) = Recorded By, (t) = Taken By, (c) = Cosigned By    Initials Name Provider Type    Navin Solares PTA Physical Therapy Assistant              Exercises     Row Name 19 [id number]             Subjective Comments    Subjective Comments  Patient reports that she has been good.  She reports she still has trouble with her back posture.  She reports she thinks she is ready to try HEP on her own.  She does see improvements since she started therapy.  She reports she is 100% improved.    -AYALA         Subjective Pain    Able to rate subjective pain?  yes  -AYALA      Pre-Treatment Pain Level  0  -AYALA      Post-Treatment Pain Level  0  -AYALA         Total  Minutes    41752 - PT Therapeutic Exercise Minutes  51  -AYALA         Exercise 1    Exercise Name 1  addressed goals for discharge  -AYALA         Exercise 2    Exercise Name 2  open books   -AYALA      Cueing 2  Verbal;Tactile;Demo  -AYALA      Sets 2  1  -AYALA      Reps 2  5  -AYALA      Additional Comments  each side, reviewed for HEP   -AYALA         Exercise 3    Exercise Name 3  SA punch   -AYALA      Cueing 3  Verbal;Tactile  -AYALA      Sets 3  2  -AYALA      Reps 3  10  -AYALA      Additional Comments  2# weight, reviewed for HEP   -AYALA         Exercise 4    Exercise Name 4  supine D2 flexion   -AYALA      Cueing 4  Verbal;Demo  -AYALA      Sets 4  2  -AYALA      Reps 4  10  -AYALA      Additional Comments  red band, reviewed for HEP   -AYALA         Exercise 5    Exercise Name 5  standing rows with red  band  -AYALA      Cueing 5  Verbal;Demo  -AYALA      Sets 5  2  -AYALA      Reps 5  15  -AYALA      Additional Comments  red band, reviewed for HEP   -AYALA         Exercise 6    Exercise Name 6  attempted rows at TRX straps, but she was unable to complete due to fear of holding onto the straps   -AYALA         Exercise 7    Exercise Name 7  standing resisted SA wall slides  -AYALA      Cueing 7  Verbal;Tactile;Demo  -AYALA      Sets 7  1  -AYALA      Reps 7  10  -AYALA      Additional Comments  red band   -AYALA        User Key  (r) = Recorded By, (t) = Taken By, (c) = Cosigned By    Initials Name Provider Type    Navin Solares, PTA Physical Therapy Assistant                         PT OP Goals     Row Name 07/12/19 1354          PT Short Term Goals    STG Date to Achieve  --  -AYALA     STG 1  --  -AYALA     STG 1 Progress  --  -AYALA     STG 2  --  -AYALA     STG 2 Progress  --  -AYALA        Long Term Goals    LTG Date to Achieve  06/10/19  -AYALA     LTG 1  Pt demonstrates independence with HEP.  -AYALA     LTG 1 Progress  Ongoing  -AYALA     LTG 1 Progress Comments  Patient reports compliance with HEP and every component was reviewed today with a new print out given   -AYALA     LTG 2  Improve  cervical rotation to 65 deg bilaterally.  -AYALA     LTG 2 Progress  Met  -     LTG 2 Progress Comments  (L) 65 degrees (R) 71 degrees no pain   -AAYLA     LTG 3  Pt will demonstrate marck lower trap, mid trap and rhomboid strength of 4/5.  -AYALA     LTG 3 Progress  Not Met  -     LTG 3 Progress Comments  3+/5 on her last visit, she reports she couldn't lay on stomach today due to her stomach hurting to test today.  Marck UE ER and IR 5/5, (R) flex 4+/5 (L) flex 4/5, Marck abduction 4+/5  -     LTG 4  Pt will demonstrate improvements in posture and postural awareness with the ability to correct without cues.  -     LTG 4 Progress  Met  -     LTG 4 Progress Comments  She is able to correct posture without cueing, but she does have resting poor posture.  However, she has improved since starting therapy in her ability to correct.  -     LTG 5  Pt will reports pain levels of <3/10 for 1 week.  -     LTG 5 Progress  Met  -     LT 5 Progress Comments  She reports she has been pain free all week   -     LTG 6     -        Time Calculation    PT Goal Re-Cert Due Date  08/04/19  -       User Key  (r) = Recorded By, (t) = Taken By, (c) = Cosigned By    Initials Name Provider Type    Navin Solares PTA Physical Therapy Assistant          Therapy Education  Education Details: HEP: reissued comprehensive HEP.  Copy in the chart   Given: HEP  Program: Reinforced  How Provided: Verbal, Demonstration, Written  Provided to: Patient  Level of Understanding: Verbalized, Demonstrated              Time Calculation:   Start Time: 1354  Stop Time: 1445  Time Calculation (min): 51 min  Total Timed Code Minutes- PT: 51 minute(s)  Therapy Charges for Today     Code Description Service Date Service Provider Modifiers Qty    [id number] HC PT THER PROC EA 15 MIN 7/12/2019 Navin Melendez PTA GP 3                OP PT Discharge Summary  Date of Discharge: 07/12/19  Reason for Discharge: Independent  Outcomes Achieved: Patient  able to partially acheive established goals, Refer to plan of care for updates on goals achieved  Discharge Destination: Home with home program  Discharge Instructions/Additional Comments: HEP with D/C      Navin Melendez, PTA  7/12/2019

## 2019-08-30 ENCOUNTER — HOSPITAL ENCOUNTER (EMERGENCY)
Facility: HOSPITAL | Age: 29
Discharge: HOME | End: 2019-08-30
Admitting: EMERGENCY MEDICINE

## 2019-08-30 VITALS
TEMPERATURE: 98.3 F | WEIGHT: 211 LBS | RESPIRATION RATE: 20 BRPM | DIASTOLIC BLOOD PRESSURE: 91 MMHG | OXYGEN SATURATION: 99 % | SYSTOLIC BLOOD PRESSURE: 134 MMHG | HEART RATE: 66 BPM | BODY MASS INDEX: 33.91 KG/M2 | HEIGHT: 66 IN

## 2019-08-30 DIAGNOSIS — Z32.02 NEGATIVE PREGNANCY TEST: Primary | ICD-10-CM

## 2019-08-30 LAB
B-HCG UR QL: NEGATIVE
HOLD SPECIMEN: NORMAL
INTERNAL NEGATIVE CONTROL: NEGATIVE
INTERNAL POSITIVE CONTROL: POSITIVE
Lab: NORMAL

## 2019-08-30 PROCEDURE — 81025 URINE PREGNANCY TEST: CPT | Performed by: PHYSICIAN ASSISTANT

## 2019-08-30 PROCEDURE — 99283 EMERGENCY DEPT VISIT LOW MDM: CPT

## 2019-10-01 ENCOUNTER — TRANSCRIBE ORDERS (OUTPATIENT)
Dept: ADMINISTRATIVE | Facility: HOSPITAL | Age: 29
End: 2019-10-01

## 2019-10-01 ENCOUNTER — APPOINTMENT (OUTPATIENT)
Dept: LAB | Facility: HOSPITAL | Age: 29
End: 2019-10-01

## 2019-10-01 DIAGNOSIS — Z79.899 DRUG THERAPY: Primary | ICD-10-CM

## 2019-10-01 LAB — GLUCOSE P FAST SERPL-MCNC: 86 MG/DL (ref 72–112)

## 2019-10-01 PROCEDURE — 36415 COLL VENOUS BLD VENIPUNCTURE: CPT | Performed by: PSYCHIATRY & NEUROLOGY

## 2019-10-01 PROCEDURE — 82947 ASSAY GLUCOSE BLOOD QUANT: CPT | Performed by: PSYCHIATRY & NEUROLOGY

## 2019-10-01 PROCEDURE — 80061 LIPID PANEL: CPT | Performed by: PSYCHIATRY & NEUROLOGY

## 2019-10-01 PROCEDURE — 83036 HEMOGLOBIN GLYCOSYLATED A1C: CPT | Performed by: PSYCHIATRY & NEUROLOGY

## 2019-10-02 LAB
CHOLEST SERPL-MCNC: 125 MG/DL (ref 0–200)
HBA1C MFR BLD: 5.1 % (ref 4.8–5.6)
HDLC SERPL-MCNC: 68 MG/DL (ref 40–60)
LDLC SERPL CALC-MCNC: 49 MG/DL (ref 0–100)
LDLC/HDLC SERPL: 0.72 {RATIO}
TRIGL SERPL-MCNC: 41 MG/DL (ref 0–150)
VLDLC SERPL-MCNC: 8.2 MG/DL (ref 5–40)

## 2020-06-09 ENCOUNTER — HOSPITAL ENCOUNTER (EMERGENCY)
Age: 30
Discharge: HOME | End: 2020-06-09
Attending: PEDIATRICS
Payer: MEDICARE

## 2020-06-09 VITALS
WEIGHT: 217 LBS | DIASTOLIC BLOOD PRESSURE: 80 MMHG | TEMPERATURE: 98.5 F | HEART RATE: 70 BPM | SYSTOLIC BLOOD PRESSURE: 134 MMHG | RESPIRATION RATE: 17 BRPM | HEIGHT: 65 IN | OXYGEN SATURATION: 96 % | BODY MASS INDEX: 36.15 KG/M2

## 2020-06-09 LAB
ALBUMIN SERPL-MCNC: 4 G/DL (ref 3.5–5.2)
ALP BLD-CCNC: 74 U/L (ref 35–104)
ALT SERPL-CCNC: 10 U/L (ref 5–33)
AMPHETAMINE SCREEN, URINE: NEGATIVE
ANION GAP SERPL CALCULATED.3IONS-SCNC: 9 MMOL/L (ref 7–19)
AST SERPL-CCNC: 14 U/L (ref 5–32)
BARBITURATE SCREEN URINE: NEGATIVE
BASOPHILS ABSOLUTE: 0 K/UL (ref 0–0.2)
BASOPHILS RELATIVE PERCENT: 0.6 % (ref 0–1)
BENZODIAZEPINE SCREEN, URINE: NEGATIVE
BILIRUB SERPL-MCNC: <0.2 MG/DL (ref 0.2–1.2)
BILIRUBIN URINE: NEGATIVE
BLOOD, URINE: NEGATIVE
BUN BLDV-MCNC: 9 MG/DL (ref 6–20)
CALCIUM SERPL-MCNC: 9 MG/DL (ref 8.6–10)
CANNABINOID SCREEN URINE: POSITIVE
CHLORIDE BLD-SCNC: 106 MMOL/L (ref 98–111)
CLARITY: CLEAR
CO2: 24 MMOL/L (ref 22–29)
COCAINE METABOLITE SCREEN URINE: NEGATIVE
COLOR: YELLOW
CREAT SERPL-MCNC: 0.6 MG/DL (ref 0.5–0.9)
EOSINOPHILS ABSOLUTE: 0.1 K/UL (ref 0–0.6)
EOSINOPHILS RELATIVE PERCENT: 1.3 % (ref 0–5)
ETHANOL: <10 MG/DL (ref 0–0.08)
GFR NON-AFRICAN AMERICAN: >60
GLUCOSE BLD-MCNC: 100 MG/DL (ref 74–109)
GLUCOSE URINE: NEGATIVE MG/DL
HCG QUALITATIVE: NEGATIVE
HCT VFR BLD CALC: 37.6 % (ref 37–47)
HEMOGLOBIN: 12.6 G/DL (ref 12–16)
IMMATURE GRANULOCYTES #: 0 K/UL
KETONES, URINE: NEGATIVE MG/DL
LEUKOCYTE ESTERASE, URINE: NEGATIVE
LYMPHOCYTES ABSOLUTE: 2 K/UL (ref 1.1–4.5)
LYMPHOCYTES RELATIVE PERCENT: 37.7 % (ref 20–40)
Lab: ABNORMAL
MCH RBC QN AUTO: 30.7 PG (ref 27–31)
MCHC RBC AUTO-ENTMCNC: 33.5 G/DL (ref 33–37)
MCV RBC AUTO: 91.5 FL (ref 81–99)
MONOCYTES ABSOLUTE: 0.3 K/UL (ref 0–0.9)
MONOCYTES RELATIVE PERCENT: 5.9 % (ref 0–10)
NEUTROPHILS ABSOLUTE: 2.9 K/UL (ref 1.5–7.5)
NEUTROPHILS RELATIVE PERCENT: 54.3 % (ref 50–65)
NITRITE, URINE: NEGATIVE
OPIATE SCREEN URINE: NEGATIVE
PDW BLD-RTO: 12 % (ref 11.5–14.5)
PH UA: 6 (ref 5–8)
PLATELET # BLD: 276 K/UL (ref 130–400)
PMV BLD AUTO: 10.9 FL (ref 9.4–12.3)
POTASSIUM SERPL-SCNC: 4 MMOL/L (ref 3.5–5)
PROTEIN UA: NEGATIVE MG/DL
RBC # BLD: 4.11 M/UL (ref 4.2–5.4)
SODIUM BLD-SCNC: 139 MMOL/L (ref 136–145)
SPECIFIC GRAVITY UA: 1.02 (ref 1–1.03)
TOTAL PROTEIN: 7.1 G/DL (ref 6.6–8.7)
UROBILINOGEN, URINE: 1 E.U./DL
WBC # BLD: 5.4 K/UL (ref 4.8–10.8)

## 2020-06-09 PROCEDURE — 80307 DRUG TEST PRSMV CHEM ANLYZR: CPT

## 2020-06-09 PROCEDURE — G0480 DRUG TEST DEF 1-7 CLASSES: HCPCS

## 2020-06-09 PROCEDURE — 99285 EMERGENCY DEPT VISIT HI MDM: CPT

## 2020-06-09 PROCEDURE — 81003 URINALYSIS AUTO W/O SCOPE: CPT

## 2020-06-09 PROCEDURE — 84703 CHORIONIC GONADOTROPIN ASSAY: CPT

## 2020-06-09 PROCEDURE — 36415 COLL VENOUS BLD VENIPUNCTURE: CPT

## 2020-06-09 PROCEDURE — 80053 COMPREHEN METABOLIC PANEL: CPT

## 2020-06-09 PROCEDURE — 85025 COMPLETE CBC W/AUTO DIFF WBC: CPT

## 2020-06-10 ASSESSMENT — ENCOUNTER SYMPTOMS
EYE DISCHARGE: 0
RHINORRHEA: 0
COLOR CHANGE: 0
BACK PAIN: 0
ABDOMINAL PAIN: 0
VOMITING: 0
COUGH: 0
NAUSEA: 0
SHORTNESS OF BREATH: 0

## 2020-06-10 ASSESSMENT — LIFESTYLE VARIABLES: HISTORY_ALCOHOL_USE: NO

## 2020-06-10 NOTE — BH NOTE
Amoxicillin 500 MG TABS, Take 500 mg by mouth 2 times daily, Disp: 20 tablet, Rfl: 0    fluticasone (FLONASE) 50 MCG/ACT nasal spray, 2 sprays by Nasal route daily, Disp: 1 Bottle, Rfl: 0     Mental Status Evaluation:     Appearance:  disheveled and overweight   Behavior:  Restless & fidgety   Speech:  normal pitch and normal volume   Mood:  anxious   Affect:  mood-congruent   Thought Process:  circumstantial   Thought Content:  denies   Sensorium:  person, place, time/date, situation, day of week, month of year and year   Cognition:  grossly intact   Insight:  good       Collateral Information:     Name: Ermelinda Hoyos  Relationship: mother  Phone Number: [phone]  Collateral:   \"She throws things and breaks things. I called police cause she went crazy and put a hole in the wall. She hasn't been taking her meds. I found a bunch of pills behind her bed a few months ago. [facility] won't help. I told them that she needed different medications but they haven't done anything. She is never going to be a typical adult but she gets so angry. \"      Current living arrangement:  Lives with mother  Current Support System:  mother  Employment:  disability    Disposition:     Choose one of the options below for disposition:     1. Decision to admit to [facility]:   no    If yes, which unit Adult or Geriatric Unit:   Is patient voluntary:   If no has a 72 hold been initiated:   Admission Diagnosis:     Does the patient have a guardian or Medical POA: No  Has the guardian been notified or Medical POA:       2. Decision to Discharge:   Does not meet criteria for acceptance to   unit due to: Patient is not SI, HI, AVH, delusional, psychotic, or paranoid. Patient is discharged with instructions to follow up with [facility] for medication management. 3. Transferred:       Patient was transferred due to:      Other follow up information provided:      Kristian Greenwood RN

## 2020-06-10 NOTE — ED PROVIDER NOTES
of clubs or organizations: Not on file     Relationship status: Not on file    Intimate partner violence     Fear of current or ex partner: Not on file     Emotionally abused: Not on file     Physically abused: Not on file     Forced sexual activity: Not on file   Other Topics Concern    Not on file   Social History Narrative    Not on file       SCREENINGS             PHYSICAL EXAM    (up to 7 for level 4, 8 or more for level 5)     ED Triage Vitals [06/09/20 2007]   BP Temp Temp Source Pulse Resp SpO2 Height Weight   135/82 98.5 °F (36.9 °C) Oral 71 16 96 % 5' 5\" (1.651 m) 217 lb (98.4 kg)       Physical Exam  Vitals signs and nursing note reviewed. Constitutional:       General: She is not in acute distress. Appearance: Normal appearance. HENT:      Head: Normocephalic and atraumatic. Right Ear: External ear normal.      Left Ear: External ear normal.      Nose: Nose normal.      Mouth/Throat:      Mouth: Mucous membranes are moist.      Pharynx: Oropharynx is clear. No oropharyngeal exudate. Eyes:      General: No scleral icterus. Conjunctiva/sclera: Conjunctivae normal.      Pupils: Pupils are equal, round, and reactive to light. Neck:      Musculoskeletal: Neck supple. No neck rigidity. Cardiovascular:      Rate and Rhythm: Normal rate and regular rhythm. Pulses: Normal pulses. Heart sounds: Normal heart sounds. Pulmonary:      Effort: Pulmonary effort is normal.      Breath sounds: Normal breath sounds. Abdominal:      General: Bowel sounds are normal.      Palpations: Abdomen is soft. Tenderness: There is no abdominal tenderness. There is no guarding. Musculoskeletal:         General: No tenderness or deformity. Skin:     General: Skin is warm and dry. Capillary Refill: Capillary refill takes less than 2 seconds. Coloration: Skin is not jaundiced. Neurological:      General: No focal deficit present.       Mental Status: She is alert and oriented to

## 2020-06-10 NOTE — ED NOTES
Bed: 18  Expected date: 6/9/20  Expected time:   Means of arrival: [place]:  [facility] manic episode     Mary Amador RN  06/09/20 2005

## 2020-09-06 ENCOUNTER — HOSPITAL ENCOUNTER (EMERGENCY)
Facility: HOSPITAL | Age: 30
Discharge: HOME | End: 2020-09-06
Admitting: FAMILY MEDICINE

## 2020-09-06 VITALS
BODY MASS INDEX: 35.99 KG/M2 | OXYGEN SATURATION: 98 % | RESPIRATION RATE: 14 BRPM | SYSTOLIC BLOOD PRESSURE: 142 MMHG | HEIGHT: 65 IN | TEMPERATURE: 98.7 F | HEART RATE: 81 BPM | WEIGHT: 216 LBS | DIASTOLIC BLOOD PRESSURE: 87 MMHG

## 2020-09-06 DIAGNOSIS — Z20.2 POSSIBLE EXPOSURE TO STD: Primary | ICD-10-CM

## 2020-09-06 LAB
CLUE CELLS SPEC QL WET PREP: NORMAL
HYDATID CYST SPEC WET PREP: NORMAL
T VAGINALIS SPEC QL WET PREP: NORMAL
WBC SPEC QL WET PREP: NORMAL
YEAST GENITAL QL WET PREP: NORMAL

## 2020-09-06 PROCEDURE — 96372 THER/PROPH/DIAG INJ SC/IM: CPT

## 2020-09-06 PROCEDURE — 99284 EMERGENCY DEPT VISIT MOD MDM: CPT

## 2020-09-06 PROCEDURE — 87491 CHLMYD TRACH DNA AMP PROBE: CPT | Performed by: PHYSICIAN ASSISTANT

## 2020-09-06 PROCEDURE — 25010000002 CEFTRIAXONE PER 250 MG: Performed by: PHYSICIAN ASSISTANT

## 2020-09-06 PROCEDURE — 25010000003 LIDOCAINE 1 % SOLUTION 20 ML VIAL: Performed by: PHYSICIAN ASSISTANT

## 2020-09-06 PROCEDURE — 87210 SMEAR WET MOUNT SALINE/INK: CPT | Performed by: PHYSICIAN ASSISTANT

## 2020-09-06 PROCEDURE — 87591 N.GONORRHOEAE DNA AMP PROB: CPT | Performed by: PHYSICIAN ASSISTANT

## 2020-09-06 RX ORDER — AZITHROMYCIN 250 MG/1
1000 TABLET, FILM COATED ORAL ONCE
Status: COMPLETED | OUTPATIENT
Start: 2020-09-06 | End: 2020-09-06

## 2020-09-06 RX ADMIN — CEFTRIAXONE SODIUM 1 G: 1 INJECTION, POWDER, FOR SOLUTION INTRAMUSCULAR; INTRAVENOUS at 17:18

## 2020-09-06 RX ADMIN — AZITHROMYCIN 1000 MG: 250 TABLET, FILM COATED ORAL at 17:23

## 2020-09-06 NOTE — DISCHARGE INSTRUCTIONS
Today you were tested for gonorrhea and chlamydia.  If these results come back positive you will be contacted in 2 days.  Even if you test positive you have already completed your treatment here today.  Should you test positive it is important that you document this in your medical record and make all of your sexual partners aware.  It is important that you have an OB/GYN provider to follow-up with 40s female concerns.  Please find the number below for our OB/GYN provider on call, Dr. Peterson.  Please read below for further information regarding safe sexual practices.      Safe Sex  Practicing safe sex means taking steps before and during sex to reduce your risk of:  · Getting an STI (sexually transmitted infection).  · Giving your partner an STI.  · Unwanted or unplanned pregnancy.  How can I practice safe sex?         Ways you can practice safe sex  · Limit your sexual partners to only one partner who is having sex with only you.  · Avoid using alcohol and drugs before having sex. Alcohol and drugs can affect your judgment.  · Before having sex with a new partner:  ? Talk to your partner about past partners, past STIs, and drug use.  ? Get screened for STIs and discuss the results with your partner. Ask your partner to get screened, too.  · Check your body regularly for sores, blisters, rashes, or unusual discharge. If you notice any of these problems, visit your health care provider.  · Avoid sexual contact if you have symptoms of an infection or you are being treated for an STI.  · While having sex, use a condom. Make sure to:  ? Use a condom every time you have vaginal, oral, or anal sex. Both females and males should wear condoms during oral sex.  ? Keep condoms in place from the beginning to the end of sexual activity.  ? Use a latex condom, if possible. Latex condoms offer the best protection.  ? Use only water-based lubricants with a condom. Using petroleum-based lubricants or oils will weaken the condom  and increase the chance that it will break.  Ways your health care provider can help you practice safe sex  · See your health care provider for regular screenings, exams, and tests for STIs.  · Talk with your health care provider about what kind of birth control (contraception) is best for you.  · Get vaccinated against hepatitis B and human papillomavirus (HPV).  · If you are at risk of being infected with HIV (human immunodeficiency virus), talk with your health care provider about taking a prescription medicine to prevent HIV infection. You are at risk for HIV if you:  ? Are a man who has sex with other men.  ? Are sexually active with more than one partner.  ? Take drugs by injection.  ? Have a sex partner who has HIV.  ? Have unprotected sex.  ? Have sex with someone who has sex with both men and women.  ? Have had an STI.  Follow these instructions at home:  · Take over-the-counter and prescription medicines as told by your health care provider.  · Keep all follow-up visits as told by your health care provider. This is important.  Where to find more information  · Centers for Disease Control and Prevention: https://www.cdc.gov/std/prevention/default.htm  · Planned Parenthood: https://www.plannedparenthood.org/  · Office on Women's Health: https://www.womenshealth.gov/a-z-topics/sexually-transmitted-infections  Summary  · Practicing safe sex means taking steps before and during sex to reduce your risk of STIs, giving your partner STIs, and having an unwanted or unplanned pregnancy.  · Before having sex with a new partner, talk to your partner about past partners, past STIs, and drug use.  · Use a condom every time you have vaginal, oral, or anal sex. Both females and males should wear condoms during oral sex.  · Check your body regularly for sores, blisters, rashes, or unusual discharge. If you notice any of these problems, visit your health care provider.  · See your health care provider for regular  screenings, exams, and tests for STIs.  This information is not intended to replace advice given to you by your health care provider. Make sure you discuss any questions you have with your health care provider.  Document Released: 01/25/2006 Document Revised: 04/10/2020 Document Reviewed: 09/30/2019  Elsevier Patient Education © 2020 [employer]

## 2020-09-06 NOTE — ED PROVIDER NOTES
"Subjective   History of Present Illness    Patient is a 30-year-old female presenting to ED with concerns for chlamydia.  Patient reported that for the past couple days she has had vaginal itching as well as possibly some discharge.  Patient reported she has no dysuria, hematuria, flank pain, suprapubic pain, fevers, chills, diaphoresis, nausea, or vomiting.  Patient reported that she has had chlamydia 3 previous times with the last episode approximately a year ago.  Patient reported that it feels the same however she did note that she does not have the best  hygiene and could possibly have some other type of \"urine infection.\"  Patient denies any medications for her symptoms.  Patient reported she last had sexual intercourse 2 days ago.  Patient denies any dyspareunia.  Patient denies any vaginal bleeding.  Patient reported she is unable to get pregnant due to \"all the birth control they have me on because of my psych meds.\"  Patient denies any other recent illnesses or symptoms including cough/cold, chest pains, troubles breathing, or any other upper abdominal pain, nausea, vomiting, or diarrhea.  Patient denies any known recent sick contacts.    Patient has no known medication allergies.  Patient takes daily Abilify, clonidine, PO birth control, and Strattera.  Patient is medical history positive for ADHD, bipolar disorder.  Patient has no previous surgical history.  Patient reports she is a current 1 pack/day cigarette smoker but denies use of any other tobacco products, alcohol, marijuana, or any other IV/recreational/illicit drugs.    Records reviewed show patient was last seen in the ED on 6/9/2020 for behavior disturbance and bipolar 1 disorder.  Patient has been seen for previous /GI complaints with a negative pregnancy test on 8/30/2019, acute UTI on 6/21/2019, as well as at the OB/GYN office on 9/24/2019 for pelvic and perineal pain, high risk heterosexual behavior, obesity, as well as " pelvic/abdominal mass and lump follow-up.  Patient has no previous CT imaging of the abdomen/pelvis or any noted ultrasounds.  Patient's last urine culture was from 6/9/2020 with no growth.    Review of Systems   Constitutional: Negative.  Negative for chills, diaphoresis and fever.   HENT: Negative.    Eyes: Negative.    Respiratory: Negative.    Cardiovascular: Negative.    Gastrointestinal: Negative.  Negative for abdominal distention, abdominal pain, diarrhea, nausea and vomiting.   Genitourinary: Positive for vaginal discharge (white) and vaginal pain (external itching). Negative for dyspareunia, dysuria, flank pain, frequency, hematuria, pelvic pain and vaginal bleeding.        Denies pregnancy (oral birth control)   Musculoskeletal: Negative.    Skin: Negative.    Neurological: Negative.    Psychiatric/Behavioral: Negative.    All other systems reviewed and are negative.      Past Medical History:   Diagnosis Date   • Attention deficit disorder    • Back pain    • Neck pain        No Known Allergies    History reviewed. No pertinent surgical history.    Family History   Problem Relation Age of Onset   • No Known Problems Mother    • No Known Problems Father    • No Known Problems Brother        Social History     Socioeconomic History   • Marital status: Single     Spouse name: Not on file   • Number of children: Not on file   • Years of education: Not on file   • Highest education level: Not on file   Tobacco Use   • Smoking status: Current Every Day Smoker     Types: Electronic Cigarette   • Smokeless tobacco: Never Used   • Tobacco comment: vape    Substance and Sexual Activity   • Alcohol use: No   • Drug use: No   • Sexual activity: Not Currently   Social History Narrative    SINGLE AND LIVES WITH PARENT           Objective   Physical Exam   Constitutional: She is oriented to person, place, and time. She appears well-developed and well-nourished. No distress.   HENT:   Head: Normocephalic and atraumatic.    Mouth/Throat: Oropharynx is clear and moist.   Eyes: Pupils are equal, round, and reactive to light. Conjunctivae and EOM are normal. No scleral icterus.   Neck: Normal range of motion. Neck supple.   Cardiovascular: Normal rate, regular rhythm, normal heart sounds and intact distal pulses.   Pulmonary/Chest: Effort normal and breath sounds normal. No respiratory distress.   Abdominal: Soft. Normal appearance and bowel sounds are normal. There is no tenderness. There is no CVA tenderness.   Genitourinary: Uterus normal. Pelvic exam was performed with patient supine. There is no rash or lesion on the right labia. There is no rash or lesion on the left labia. Cervix exhibits no motion tenderness, no discharge and no friability. Right adnexum displays no tenderness. Left adnexum displays no tenderness. No erythema, tenderness or bleeding in the vagina. Vaginal discharge (Scant amount, thin white) found.   Genitourinary Comments: Chaperone present for entire examination, Yue RODRIGUEZ   Neurological: She is alert and oriented to person, place, and time.   Skin: Skin is warm and dry. Capillary refill takes less than 2 seconds. She is not diaphoretic.   Psychiatric: She has a normal mood and affect. Her behavior is normal. Judgment normal. Her speech is rapid and/or pressured.   Nursing note and vitals reviewed.      Procedures           ED Course  ED Course as of Sep 06 1654   Sun Sep 06, 2020   1502 pt    [JS]   1524 Pelvic examination performed at bedside at this time a chaperone present for entire examination, Yue RODRIGUEZ.    [JS]   1626 Wet prep negative.Patient continues to decline ability to provide a urine sample.  Discussed with patient risk, benefits, and alternatives to leaving without completion of urinalysis.  Patient would like to think about continuing to provide a urine sample.    [JS]   1641 Upon reevaluation discussed with patient wet prep results.  Discussed with patient that she has been tested for  "gonorrhea and chlamydia and will receive the results in 2 days if she is positive however she will of already completed her treatment and will only need to document in her medical records as well as make all of her sexual partners aware.  Once again asked patient to provide a urine sample to assure that she does not have any urinary tract infections.  Patient continues to decline stating \"I want her before I got here so it is going to be a while before I make any.\"  Discussed with patient importance of follow-up through her primary care provider but also provided patient information for an OB/GYN provider and discussed need for routine female examinations, as well as need for follow-up in regards to safe sexual practices.  Discussed with patient strict return precautions and need for immediate return to ED should she develop any new or worsening symptoms.  Patient with no further questions, concerns, or needs at this time and is now stable for discharge.    [JS]      ED Course User Index  [JS] Israel Modi PA-C                                           MDM  Number of Diagnoses or Management Options  Possible exposure to STD:      Amount and/or Complexity of Data Reviewed  Clinical lab tests: ordered and reviewed  Tests in the medicine section of CPT®: reviewed and ordered  Decide to obtain previous medical records or to obtain history from someone other than the patient: yes  Review and summarize past medical records: yes    Patient Progress  Patient progress: stable      Final diagnoses:   Possible exposure to STD            Israel Modi PA-C  09/06/20 9818    "

## 2020-09-06 NOTE — ED NOTES
"Patient states she wants to be checked for chlamydia. States she is \"burning down there but doesn't take showers as much as she should so it could be a UTI\" patient admits she is having unprotected sex.      Umair Powell, RN  09/06/20 4645    "

## 2020-09-08 LAB
C TRACH RRNA CVX QL NAA+PROBE: NOT DETECTED
N GONORRHOEA RRNA SPEC QL NAA+PROBE: NOT DETECTED

## 2020-09-08 NOTE — ED PROVIDER NOTES
Patient called for pelvic swab results.  Results were negative for gonorrhea and chlamydia.  Patient notified.  Followup with PCP.     Nery Shaffer MD  09/08/20 0030

## 2020-09-20 ENCOUNTER — HOSPITAL ENCOUNTER (EMERGENCY)
Facility: HOSPITAL | Age: 30
Discharge: HOME | End: 2020-09-20
Admitting: EMERGENCY MEDICINE

## 2020-09-20 VITALS
OXYGEN SATURATION: 100 % | SYSTOLIC BLOOD PRESSURE: 121 MMHG | TEMPERATURE: 98.5 F | DIASTOLIC BLOOD PRESSURE: 81 MMHG | RESPIRATION RATE: 20 BRPM | HEIGHT: 65 IN | WEIGHT: 212 LBS | HEART RATE: 66 BPM | BODY MASS INDEX: 35.32 KG/M2

## 2020-09-20 DIAGNOSIS — T78.40XA ALLERGIC REACTION, INITIAL ENCOUNTER: Primary | ICD-10-CM

## 2020-09-20 PROCEDURE — 25010000002 METHYLPREDNISOLONE PER 125 MG: Performed by: NURSE PRACTITIONER

## 2020-09-20 PROCEDURE — 99282 EMERGENCY DEPT VISIT SF MDM: CPT

## 2020-09-20 PROCEDURE — 96375 TX/PRO/DX INJ NEW DRUG ADDON: CPT

## 2020-09-20 PROCEDURE — 96374 THER/PROPH/DIAG INJ IV PUSH: CPT

## 2020-09-20 PROCEDURE — 25010000002 DIPHENHYDRAMINE PER 50 MG: Performed by: NURSE PRACTITIONER

## 2020-09-20 RX ORDER — METHYLPREDNISOLONE SODIUM SUCCINATE 125 MG/2ML
125 INJECTION, POWDER, LYOPHILIZED, FOR SOLUTION INTRAMUSCULAR; INTRAVENOUS ONCE
Status: COMPLETED | OUTPATIENT
Start: 2020-09-20 | End: 2020-09-20

## 2020-09-20 RX ORDER — FAMOTIDINE 10 MG/ML
20 INJECTION, SOLUTION INTRAVENOUS ONCE
Status: COMPLETED | OUTPATIENT
Start: 2020-09-20 | End: 2020-09-20

## 2020-09-20 RX ORDER — METHYLPREDNISOLONE 4 MG/1
TABLET ORAL
Qty: 21 EACH | Refills: 0 | OUTPATIENT
Start: 2020-09-20 | End: 2021-08-22

## 2020-09-20 RX ORDER — DIPHENHYDRAMINE HCL 25 MG
25 TABLET ORAL EVERY 6 HOURS PRN
Qty: 20 TABLET | Refills: 0 | Status: SHIPPED | OUTPATIENT
Start: 2020-09-20 | End: 2022-11-21

## 2020-09-20 RX ORDER — FAMOTIDINE 20 MG/1
20 TABLET, FILM COATED ORAL 2 TIMES DAILY
Qty: 10 TABLET | Refills: 0 | Status: SHIPPED | OUTPATIENT
Start: 2020-09-20 | End: 2020-09-25

## 2020-09-20 RX ORDER — DIPHENHYDRAMINE HYDROCHLORIDE 50 MG/ML
50 INJECTION INTRAMUSCULAR; INTRAVENOUS ONCE
Status: COMPLETED | OUTPATIENT
Start: 2020-09-20 | End: 2020-09-20

## 2020-09-20 RX ADMIN — METHYLPREDNISOLONE SODIUM SUCCINATE 125 MG: 125 INJECTION, POWDER, FOR SOLUTION INTRAMUSCULAR; INTRAVENOUS at 13:53

## 2020-09-20 RX ADMIN — DIPHENHYDRAMINE HYDROCHLORIDE 50 MG: 50 INJECTION, SOLUTION INTRAMUSCULAR; INTRAVENOUS at 13:53

## 2020-09-20 RX ADMIN — FAMOTIDINE 20 MG: 10 INJECTION INTRAVENOUS at 13:53

## 2020-09-20 NOTE — DISCHARGE INSTRUCTIONS
Return to ER if symptoms worsen   Avoid the antibiotic that you have at home for your urinary tract infection (pt is not sure of the name)

## 2020-09-20 NOTE — ED PROVIDER NOTES
"Subjective   Patient is a 30-year-old white female presents emergency department with generalized rash that started about 3 to 4 days ago.  She states that she thinks it started after she started some antibiotics for urinary tract infection.  She is not sure of the name but states they are a \"big white pill I take twice a day.\"  Most likely this is Bactrim.  She states she started having a rash about 2 days after she started antibiotics.  She denies any shortness of breath or difficulty swallowing.  She denies any other complaints.  She has not taken anything for the rash.      History provided by:  Patient   used: No        Review of Systems   Constitutional: Negative.    HENT: Negative.    Eyes: Negative.    Respiratory: Negative.    Cardiovascular: Negative.    Gastrointestinal: Negative.    Endocrine: Negative.    Genitourinary: Negative.    Musculoskeletal: Negative.    Skin:        Patient is a 30-year-old white female presents emergency department with generalized rash that started about 3 to 4 days ago.  She states that she thinks it started after she started some antibiotics for urinary tract infection.  She is not sure of the name but states they are a \"big white pill I take twice a day.\"  Most likely this is Bactrim.  She states she started having a rash about 2 days after she started antibiotics.  She denies any shortness of breath or difficulty swallowing.  She denies any other complaints.  She has not taken anything for the rash.   Allergic/Immunologic: Negative.    Neurological: Negative.    Hematological: Negative.    Psychiatric/Behavioral: Negative.    All other systems reviewed and are negative.      Past Medical History:   Diagnosis Date   • Attention deficit disorder    • Back pain    • Neck pain        No Known Allergies    History reviewed. No pertinent surgical history.    Family History   Problem Relation Age of Onset   • No Known Problems Mother    • No Known Problems " "Father    • No Known Problems Brother        Social History     Socioeconomic History   • Marital status: Single     Spouse name: Not on file   • Number of children: Not on file   • Years of education: Not on file   • Highest education level: Not on file   Tobacco Use   • Smoking status: Current Every Day Smoker     Types: Electronic Cigarette   • Smokeless tobacco: Never Used   • Tobacco comment: vape    Substance and Sexual Activity   • Alcohol use: No   • Drug use: No   • Sexual activity: Not Currently   Social History Narrative    SINGLE AND LIVES WITH PARENT       Prior to Admission medications    Medication Sig Start Date End Date Taking? Authorizing Provider   ARIPiprazole (ABILIFY) 15 MG tablet Take 15 mg by mouth Daily. 8/22/16  Yes ProviderDonavan MD   atomoxetine (STRATTERA) 60 MG capsule TAKE 1 CAPSULE  60 MG  BY ORAL ROUTE 1 TIME PER DAY 3/15/19  Yes Emergency, Nurse JENNIFER Verdugo   cloNIDine (CATAPRES) 0.1 MG tablet Take 0.1 mg by mouth Every Night. For sleep 8/22/16  Yes ProviderDonavan MD   Norgestimate-Eth Estradiol (ORTHO-CYCLEN, 28, PO) Take 1 tablet/day by mouth.   Yes Provider, MD Donavan   ORAP 1 MG tablet Take 1 mg by mouth Daily. 8/22/16  Yes Provider, MD Donavan   TRINESSA, 28, 0.18/0.215/0.25 MG-35 MCG per tablet Take 1 tablet by mouth Daily. 11/13/18  Yes Emergency, Nurse Lucina, RN       /92 (BP Location: Right arm, Patient Position: Lying)   Pulse 90   Temp 98.3 °F (36.8 °C) (Oral)   Resp 18   Ht 165.1 cm (65\")   Wt 96.2 kg (212 lb)   LMP  (Within Weeks)   SpO2 97%   BMI 35.28 kg/m²     Objective   Physical Exam  Vitals signs and nursing note reviewed.   Constitutional:       Appearance: She is well-developed.   HENT:      Head: Normocephalic and atraumatic.   Eyes:      Conjunctiva/sclera: Conjunctivae normal.      Pupils: Pupils are equal, round, and reactive to light.   Neck:      Musculoskeletal: Normal range of motion and neck supple.      Thyroid: No " thyromegaly.      Trachea: No tracheal deviation.   Cardiovascular:      Rate and Rhythm: Normal rate and regular rhythm.      Heart sounds: Normal heart sounds.   Pulmonary:      Effort: Pulmonary effort is normal. No respiratory distress.      Breath sounds: Normal breath sounds. No wheezing or rales.   Chest:      Chest wall: No tenderness.   Abdominal:      General: Bowel sounds are normal.      Palpations: Abdomen is soft.   Musculoskeletal: Normal range of motion.   Skin:     General: Skin is warm and dry.      Comments: Generalized macular papular rash noted to the trunk and bilateral upper extremities and lower extremities.  Appears to be consistent with allergic reaction   Neurological:      Mental Status: She is alert and oriented to person, place, and time.      Cranial Nerves: No cranial nerve deficit.      Deep Tendon Reflexes: Reflexes are normal and symmetric.   Psychiatric:         Behavior: Behavior normal.         Thought Content: Thought content normal.         Judgment: Judgment normal.         Procedures         Lab Results (last 24 hours)     ** No results found for the last 24 hours. **          No orders to display       ED Course  ED Course as of Sep 20 1423   Sun Sep 20, 2020   1417 Patient rash is much improved after the Benadryl, Solu-Medrol, and Pepcid.  Spoke with the mother on the phone and advised that the patient was most likely allergic to the antibiotic that she started for UTI 5 days ago.  The mother is unsure of the name of the antibiotic and will check when she gets home.  Advised that she needs to avoid this medication in the future.  Patient be sent home shortly in stable condition.  Advised to follow-up with her primary care provider next week.  Advised to return before symptoms worsen.    [CW]      ED Course User Index  [CW] Jessica Loera APRN          MDM  Number of Diagnoses or Management Options  Allergic reaction, initial encounter: minor  Patient  Progress  Patient progress: stable      Final diagnoses:   Allergic reaction, initial encounter          Jessica Loera, APRN  09/20/20 7403

## 2020-10-06 NOTE — TELEPHONE ENCOUNTER
Procedure Date: 10/06/2020      SURGEON:  Jacinta Duenas MD      ASSISTANT:  Malini Evans PA-C.      Malini Evans PA-C skilled assistant, was required for retraction of soft tissues, protection of neurovascular structures, visualization of the knee, positioning of the leg, layered wound closure of skin and patient transfer.      PREOPERATIVE DIAGNOSIS:  Left knee end-stage osteoarthritis.      POSTOPERATIVE DIAGNOSIS:  Left knee end-stage osteoarthritis.      PROCEDURE:  Total knee arthroplasty, left.      IMPLANTS:  [place] Tri[employer] size 6 femur, size 5 tibia, 35 mm patellar button, and 11 mm polyethylene insert.      INDICATIONS:  Caro Elder is a 75-year-old woman who has had advanced end-stage arthritis of her knee and has exhausted nonoperative measures.  Risks, benefits and alternatives of continuation of these measures versus operative replacement of her knee were discussed, questions answered, she elected to proceed.      DESCRIPTION OF PROCEDURE:  After informed consent was obtained and operative site marked, the patient was brought to the operating room suite.  Spinal anesthetic and sedation were administered.  Her left lower extremity was prepped and draped in the usual sterile fashion.  A surgical timeout was taken.        An anterior incision was made on the knee.  Sharp dissection carried out through skin and subcutaneous fat.  A parapatellar arthrotomy was created and the knee was brought into hyperflexion.  At this time, the anterior horns of the menisci and fat pad were removed.  The intramedullary drill was used to find the intramedullary canal and a canal based distal femoral articular surface resecting guide was positioned.  The distal femur was resected and subsequently sized and rotational alignment was selected.  Once this cutting jig was secured, anterior, posterior and chamfer cuts followed by PCL sacrificing box cut were completed.  Marginal osteophytes were removed off the  Pt initially fell asleep filling out her paperwork. Evaluation was started, however pt had difficulty staying awake and oriented. She was dozing on and off throughout questioning and during cervical ROM testing. Pt reported that she had taken a muscle relaxer prior to therapy. Upon palpation of her shoulder and cervical musculature she was very lax. Pt fell asleep again when in the prone position on the table. I did not feel that an accurate assessment could be conducted due to inconsistent response to questions and inability to actively participate. Discussion was made to call pt emergency contact to come pick her up. Pt was asleep on treatment table for approx 10-15 minutes. Once she was awake I discussed with her I was no comfortable with her driving. She resisted us calling anyone and claimed she would be fine. I made the patient wait another 10 minutes before leaving and provided water and some crackers. She was able to stay awake and seemed more alert before leaving. Her eval was rescheduled at a later date.   femur.      The tibia was then addressed with extramedullary guide, correcting for coronal and sagittal alignment, 9 mm of proximal tibial articular surface were removed along with remainder of the cruciates and menisci.  Tibial marginal osteophytes were then removed and sizing and rotational alignment of the tibial side was completed.  Once completed, a trial femur was placed followed by trial polyethylene inserts.  With the knee in extension, the articular surface of the patella was resected, sized and punched.  The knee was then taken through a range of motion and ligamentous balancing completed.  The knee was found to have full extension, flexion, stability throughout the range of motion and normal patellar tracking.  This was done with an 11 mm polyethylene insert.  All trials were removed.  Local anesthetic was administered in the pericapsular soft tissues.  Irrigation was performed and the bone ends were dried and prepared for final cementation.  Final components were cemented into place.  Once the cement was hard, a Betadine wash was completed and the final polyethylene insert placed.  The arthrotomy was closed followed by layered wound closure of skin and sterile dressing application.  The patient was taken to PACU in stable condition.      ESTIMATED BLOOD LOSS:  Minimal.      COMPLICATIONS:  None apparent.      POSTOPERATIVE PLAN:  Bear weight as tolerated.  Once the block wears off she has unrestricted range of motion.  Ice, elevation, intermittent use of oral medicines for pain and daily aspirin for DVT prophylaxis.  PT to start postop day #0.  Anticipate discharge home postop day #1.  Outpatient followup in 10-14 days, sooner if needed.         FIDELINA WILLSON MD             D: 10/06/2020   T: 10/06/2020   MT: NORMA      Name:     RIP BOLANOS   MRN:      [id number]        Account:        [id number]   :      1945           Procedure Date: 10/06/2020      Document: [id number]

## 2020-12-01 ENCOUNTER — LAB (OUTPATIENT)
Dept: LAB | Facility: HOSPITAL | Age: 30
End: 2020-12-01

## 2020-12-01 ENCOUNTER — TRANSCRIBE ORDERS (OUTPATIENT)
Dept: ADMINISTRATIVE | Facility: HOSPITAL | Age: 30
End: 2020-12-01

## 2020-12-01 DIAGNOSIS — Z79.899 ENCOUNTER FOR LONG-TERM (CURRENT) USE OF OTHER MEDICATIONS: Primary | ICD-10-CM

## 2020-12-01 LAB
CHOLEST SERPL-MCNC: 157 MG/DL (ref 0–200)
GLUCOSE P FAST SERPL-MCNC: 100 MG/DL (ref 74–106)
HBA1C MFR BLD: 5.4 % (ref 4.8–5.6)
HDLC SERPL-MCNC: 69 MG/DL (ref 40–60)
LDLC SERPL CALC-MCNC: 76 MG/DL (ref 0–100)
LDLC/HDLC SERPL: 1.1 {RATIO}
TRIGL SERPL-MCNC: 60 MG/DL (ref 0–150)
VLDLC SERPL-MCNC: 12 MG/DL (ref 5–40)

## 2020-12-01 PROCEDURE — 80061 LIPID PANEL: CPT | Performed by: PSYCHIATRY & NEUROLOGY

## 2020-12-01 PROCEDURE — 36415 COLL VENOUS BLD VENIPUNCTURE: CPT | Performed by: PSYCHIATRY & NEUROLOGY

## 2020-12-01 PROCEDURE — 83036 HEMOGLOBIN GLYCOSYLATED A1C: CPT | Performed by: PSYCHIATRY & NEUROLOGY

## 2020-12-01 PROCEDURE — 82947 ASSAY GLUCOSE BLOOD QUANT: CPT | Performed by: PSYCHIATRY & NEUROLOGY

## 2021-07-09 ENCOUNTER — LAB (OUTPATIENT)
Dept: LAB | Facility: HOSPITAL | Age: 31
End: 2021-07-09

## 2021-07-09 ENCOUNTER — TRANSCRIBE ORDERS (OUTPATIENT)
Dept: ADMINISTRATIVE | Facility: HOSPITAL | Age: 31
End: 2021-07-09

## 2021-07-09 DIAGNOSIS — Z79.899 ENCOUNTER FOR LONG-TERM (CURRENT) USE OF OTHER MEDICATIONS: Primary | ICD-10-CM

## 2021-07-09 LAB
CHOLEST SERPL-MCNC: 142 MG/DL (ref 0–200)
GLUCOSE P FAST SERPL-MCNC: 86 MG/DL (ref 74–106)
HBA1C MFR BLD: 5.22 % (ref 4.8–5.6)
HDLC SERPL-MCNC: 59 MG/DL (ref 40–60)
LDLC SERPL CALC-MCNC: 68 MG/DL (ref 0–100)
LDLC/HDLC SERPL: 1.16 {RATIO}
TRIGL SERPL-MCNC: 74 MG/DL (ref 0–150)
VLDLC SERPL-MCNC: 15 MG/DL (ref 5–40)

## 2021-07-09 PROCEDURE — 36415 COLL VENOUS BLD VENIPUNCTURE: CPT | Performed by: PSYCHIATRY & NEUROLOGY

## 2021-07-09 PROCEDURE — 83036 HEMOGLOBIN GLYCOSYLATED A1C: CPT | Performed by: PSYCHIATRY & NEUROLOGY

## 2021-07-09 PROCEDURE — 82947 ASSAY GLUCOSE BLOOD QUANT: CPT | Performed by: PSYCHIATRY & NEUROLOGY

## 2021-07-09 PROCEDURE — 80061 LIPID PANEL: CPT | Performed by: PSYCHIATRY & NEUROLOGY

## 2021-08-22 ENCOUNTER — HOSPITAL ENCOUNTER (EMERGENCY)
Facility: HOSPITAL | Age: 31
Discharge: HOME | End: 2021-08-22
Admitting: EMERGENCY MEDICINE

## 2021-08-22 ENCOUNTER — APPOINTMENT (OUTPATIENT)
Dept: GENERAL RADIOLOGY | Facility: HOSPITAL | Age: 31
End: 2021-08-22

## 2021-08-22 VITALS
WEIGHT: 220 LBS | RESPIRATION RATE: 17 BRPM | BODY MASS INDEX: 36.65 KG/M2 | HEIGHT: 65 IN | HEART RATE: 94 BPM | TEMPERATURE: 99 F | DIASTOLIC BLOOD PRESSURE: 87 MMHG | SYSTOLIC BLOOD PRESSURE: 133 MMHG | OXYGEN SATURATION: 100 %

## 2021-08-22 DIAGNOSIS — U07.1 COVID-19: Primary | ICD-10-CM

## 2021-08-22 LAB
S PYO AG THROAT QL: NEGATIVE
SARS-COV-2 RNA PNL SPEC NAA+PROBE: DETECTED

## 2021-08-22 PROCEDURE — 87880 STREP A ASSAY W/OPTIC: CPT | Performed by: NURSE PRACTITIONER

## 2021-08-22 PROCEDURE — 99283 EMERGENCY DEPT VISIT LOW MDM: CPT

## 2021-08-22 PROCEDURE — 71045 X-RAY EXAM CHEST 1 VIEW: CPT

## 2021-08-22 PROCEDURE — 87081 CULTURE SCREEN ONLY: CPT | Performed by: NURSE PRACTITIONER

## 2021-08-22 PROCEDURE — 87635 SARS-COV-2 COVID-19 AMP PRB: CPT | Performed by: NURSE PRACTITIONER

## 2021-08-22 NOTE — ED PROVIDER NOTES
Subjective   Pt is a 31-year-old white female presents emergency department with cough, sore throat, congestion for the past 3 days.  She denies any nausea, vomiting, or diarrhea.  She states she has had generalized body aches as well.  She has not received the COVID-19 vaccination.  She denies any chest pain or shortness of breath.      History provided by:  Patient   used: No        Review of Systems   Constitutional: Negative.    HENT: Negative.    Eyes: Negative.    Respiratory:        Pt is a 31-year-old white female presents emergency department with cough, sore throat, congestion for the past 3 days.  She denies any nausea, vomiting, or diarrhea.  She states she has had generalized body aches as well.  She has not received the COVID-19 vaccination.  She denies any chest pain or shortness of breath.     Cardiovascular: Negative.    Gastrointestinal: Negative.    Endocrine: Negative.    Genitourinary: Negative.    Musculoskeletal: Negative.    Skin: Negative.    Allergic/Immunologic: Negative.    Neurological: Negative.    Hematological: Negative.    Psychiatric/Behavioral: Negative.    All other systems reviewed and are negative.      Past Medical History:   Diagnosis Date   • Attention deficit disorder    • Back pain    • Neck pain        No Known Allergies    No past surgical history on file.    Family History   Problem Relation Age of Onset   • No Known Problems Mother    • No Known Problems Father    • No Known Problems Brother        Social History     Socioeconomic History   • Marital status: Single     Spouse name: Not on file   • Number of children: Not on file   • Years of education: Not on file   • Highest education level: Not on file   Tobacco Use   • Smoking status: Current Every Day Smoker     Types: Electronic Cigarette   • Smokeless tobacco: Never Used   • Tobacco comment: vape    Substance and Sexual Activity   • Alcohol use: No   • Drug use: No   • Sexual activity: Not  "Currently       Prior to Admission medications    Medication Sig Start Date End Date Taking? Authorizing Provider   ARIPiprazole (ABILIFY) 15 MG tablet Take 15 mg by mouth Daily. 8/22/16   Donavan Sims MD   atomoxetine (STRATTERA) 60 MG capsule TAKE 1 CAPSULE  60 MG  BY ORAL ROUTE 1 TIME PER DAY 3/15/19   Emergency, Nurse JENNIFER Verdugo   cloNIDine (CATAPRES) 0.1 MG tablet Take 0.1 mg by mouth Every Night. For sleep 8/22/16   Donavan Sims MD   diphenhydrAMINE (BENADRYL) 25 MG tablet Take 1 tablet by mouth Every 6 (Six) Hours As Needed for Itching. 9/20/20   Jessica Lorea APRN   methylPREDNISolone (MEDROL) 4 MG dose pack Take as directed on package instructions. 9/20/20   Jessica Loera APRN   Norgestimate-Eth Estradiol (ORTHO-CYCLEN, 28, PO) Take 1 tablet/day by mouth.    Donavan Sims MD   ORAP 1 MG tablet Take 1 mg by mouth Daily. 8/22/16   Donavan Sims MD TRINESSA, 28, 0.18/0.215/0.25 MG-35 MCG per tablet Take 1 tablet by mouth Daily. 11/13/18   Emergency, Nurse JENNIFER Verdugo       /87   Pulse 94   Temp 99 °F (37.2 °C)   Resp 17   Ht 165.1 cm (65\")   Wt 99.8 kg (220 lb)   LMP 08/18/2021   SpO2 100%   BMI 36.61 kg/m²     Objective   Physical Exam  Vitals and nursing note reviewed.   Constitutional:       Appearance: She is well-developed.   HENT:      Head: Normocephalic and atraumatic.      Comments: Oropharynx is slightly erythematous with thick clear postnasal drainage.  There is no exudate  Eyes:      Conjunctiva/sclera: Conjunctivae normal.      Pupils: Pupils are equal, round, and reactive to light.   Neck:      Thyroid: No thyromegaly.      Trachea: No tracheal deviation.   Cardiovascular:      Rate and Rhythm: Normal rate and regular rhythm.      Heart sounds: Normal heart sounds.   Pulmonary:      Effort: Pulmonary effort is normal. No respiratory distress.      Breath sounds: Normal breath sounds. No wheezing or rales.   Chest:      Chest wall: No " tenderness.   Abdominal:      General: Bowel sounds are normal.      Palpations: Abdomen is soft.   Musculoskeletal:         General: Normal range of motion.      Cervical back: Normal range of motion and neck supple.   Skin:     General: Skin is warm and dry.   Neurological:      Mental Status: She is alert and oriented to person, place, and time.      Cranial Nerves: No cranial nerve deficit.      Deep Tendon Reflexes: Reflexes are normal and symmetric.   Psychiatric:         Behavior: Behavior normal.         Thought Content: Thought content normal.         Judgment: Judgment normal.         Procedures         Lab Results (last 24 hours)     Procedure Component Value Units Date/Time    COVID PRE-OP / PRE-PROCEDURE SCREENING ORDER (NO ISOLATION) - Swab, Nasal Cavity [[id number]]  (Abnormal) Collected: 08/22/21 1550    Specimen: Swab from Nasal Cavity Updated: 08/22/21 1647    Narrative:      The following orders were created for panel order COVID PRE-OP / PRE-PROCEDURE SCREENING ORDER (NO ISOLATION) - Swab, Nasal Cavity.  Procedure                               Abnormality         Status                     ---------                               -----------         ------                     COVID-19,[employer] [facility]...[[id number]]  Abnormal            Final result                 Please view results for these tests on the individual orders.    Rapid Strep A Screen - Swab, Throat [[id number]]  (Normal) Collected: 08/22/21 1550    Specimen: Swab from Throat Updated: 08/22/21 1647     Strep A Ag Negative    COVID-19,[employer] IN-HOUSE,Nasal Swab No Transport Media 3-4 HR TAT - Swab, Nasal Cavity [[id number]]  (Abnormal) Collected: 08/22/21 1550    Specimen: Swab from Nasal Cavity Updated: 08/22/21 1647     COVID19 Detected    Narrative:      Fact sheet for providers: https://www.fda.gov/media/901459/download     Fact sheet for patients: https://www.fda.gov/media/466862/download    Test performed by PCR.    Consider  negative results in combination with clinical observations, patient history, and epidemiological information.    Beta Strep Culture, Throat - Swab, Throat [[id number]] Collected: 08/22/21 1550    Specimen: Swab from Throat Updated: 08/22/21 1647          XR Chest 1 View   Final Result   Shallow inspiration, no acute cardiopulmonary abnormality.       This report was finalized on 08/22/2021 16:25 by Dr. Benjamin Nunes MD.          ED Course  ED Course as of Aug 22 1841   Sun Aug 22, 2021   1658 Advised pt of test findings. Cxr-negative, strep negative, covid 19 positive. 02 sat 100% on ra throughout stay in the er . Home quarantine for 2 weeks. Increase oral fluids     [CW]      ED Course User Index  [CW] Jessica Loera, ANNETTE          MDM  Number of Diagnoses or Management Options  COVID-19: minor     Amount and/or Complexity of Data Reviewed  Clinical lab tests: ordered and reviewed  Tests in the radiology section of CPT®: ordered and reviewed    Patient Progress  Patient progress: stable      Final diagnoses:   COVID-19          Jessica Loera, ANNETTE  08/22/21 1841

## 2021-08-24 LAB — BACTERIA SPEC AEROBE CULT: NORMAL

## 2021-12-30 ENCOUNTER — HOSPITAL ENCOUNTER (OUTPATIENT)
Dept: ULTRASOUND IMAGING | Age: 31
Discharge: HOME | End: 2021-12-30
Payer: MEDICARE

## 2021-12-30 DIAGNOSIS — R10.9 ABDOMINAL PAIN, UNSPECIFIED ABDOMINAL LOCATION: ICD-10-CM

## 2021-12-30 PROCEDURE — 76705 ECHO EXAM OF ABDOMEN: CPT

## 2022-08-24 ENCOUNTER — OFFICE VISIT (OUTPATIENT)
Dept: FAMILY MEDICINE CLINIC | Facility: CLINIC | Age: 32
End: 2022-08-24

## 2022-08-24 VITALS
HEART RATE: 103 BPM | SYSTOLIC BLOOD PRESSURE: 150 MMHG | BODY MASS INDEX: 39.72 KG/M2 | TEMPERATURE: 98.8 F | WEIGHT: 238.4 LBS | OXYGEN SATURATION: 98 % | HEIGHT: 65 IN | DIASTOLIC BLOOD PRESSURE: 89 MMHG

## 2022-08-24 DIAGNOSIS — F90.2 ATTENTION DEFICIT HYPERACTIVITY DISORDER (ADHD), COMBINED TYPE: ICD-10-CM

## 2022-08-24 DIAGNOSIS — F39 MOOD DISORDER: Primary | ICD-10-CM

## 2022-08-24 DIAGNOSIS — E66.09 CLASS 2 OBESITY DUE TO EXCESS CALORIES WITHOUT SERIOUS COMORBIDITY WITH BODY MASS INDEX (BMI) OF 39.0 TO 39.9 IN ADULT: ICD-10-CM

## 2022-08-24 PROBLEM — E66.812 CLASS 2 OBESITY DUE TO EXCESS CALORIES WITHOUT SERIOUS COMORBIDITY WITH BODY MASS INDEX (BMI) OF 39.0 TO 39.9 IN ADULT: Status: ACTIVE | Noted: 2022-08-24

## 2022-08-24 PROCEDURE — 99204 OFFICE O/P NEW MOD 45 MIN: CPT | Performed by: PEDIATRICS

## 2022-08-24 RX ORDER — LAMOTRIGINE 25 MG/1
TABLET ORAL
Qty: 42 TABLET | Refills: 0 | Status: SHIPPED | OUTPATIENT
Start: 2022-08-24 | End: 2022-09-21 | Stop reason: SDUPTHER

## 2022-08-24 NOTE — ASSESSMENT & PLAN NOTE
When mood is stable we will consider treatment if symptoms persist.      We will discontinue Strattera.  He does not help.

## 2022-08-24 NOTE — ASSESSMENT & PLAN NOTE
Patient's (Body mass index is 39.67 kg/m².) indicates that they are obese (BMI >30) with health conditions that include none . Weight is unchanged. BMI is is above average; BMI management plan is completed. We discussed portion control and increasing exercise.

## 2022-08-24 NOTE — PROGRESS NOTES
"Chief Complaint  ADHD (Pt here for initial ADHD, pt mother states this has been going on since she was younger.) and Establish Care    Subjective        Elisa Barrett presents to [facility]  ADHD   Pt is here for an initial ADHD and mood evaluation.  She is struggling with attention, focus, and mood instability.  HPI reviewed and discussed.    Objective   Vital Signs:  /89   Pulse 103   Temp 98.8 °F (37.1 °C)   Ht 165.1 cm (65\")   Wt 108 kg (238 lb 6.4 oz)   SpO2 98%   BMI 39.67 kg/m²   Estimated body mass index is 39.67 kg/m² as calculated from the following:    Height as of this encounter: 165.1 cm (65\").    Weight as of this encounter: 108 kg (238 lb 6.4 oz).          Physical Exam  Vitals and nursing note reviewed.   Constitutional:       Appearance: Normal appearance. She is well-developed. She is obese.   HENT:      Head: Normocephalic and atraumatic.      Right Ear: External ear normal.      Left Ear: External ear normal.      Nose: Nose normal. No nasal tenderness or congestion.      Mouth/Throat:      Lips: Pink. No lesions.      Mouth: Mucous membranes are moist. No oral lesions.      Dentition: Normal dentition.      Pharynx: Oropharynx is clear. No pharyngeal swelling, oropharyngeal exudate or posterior oropharyngeal erythema.   Eyes:      General: Lids are normal. Vision grossly intact. No scleral icterus.        Right eye: No discharge.         Left eye: No discharge.      Extraocular Movements: Extraocular movements intact.      Conjunctiva/sclera: Conjunctivae normal.      Right eye: Right conjunctiva is not injected.      Left eye: Left conjunctiva is not injected.      Pupils: Pupils are equal, round, and reactive to light.   Cardiovascular:      Rate and Rhythm: Normal rate and regular rhythm.      Heart sounds: Normal heart sounds. No murmur heard.    No gallop.   Pulmonary:      Effort: Pulmonary effort is normal.      Breath sounds: Normal breath " "sounds and air entry. No wheezing, rhonchi or rales.   Musculoskeletal:         General: No tenderness or deformity. Normal range of motion.      Cervical back: Full passive range of motion without pain, normal range of motion and neck supple.      Right lower leg: No edema.      Left lower leg: No edema.   Skin:     General: Skin is warm and dry.      Coloration: Skin is not jaundiced.      Findings: No rash.   Neurological:      Mental Status: She is alert and oriented to person, place, and time.      Cranial Nerves: Cranial nerves are intact.      Sensory: Sensation is intact.      Motor: Motor function is intact.      Coordination: Coordination is intact.      Gait: Gait is intact.   Psychiatric:         Attention and Perception: Attention normal.         Mood and Affect: Mood and affect normal.         Behavior: Behavior is not hyperactive. Behavior is cooperative.         Thought Content: Thought content normal.         Judgment: Judgment normal.        Result Review :                Assessment and Plan   Diagnoses and all orders for this visit:    1. Mood disorder (HCC) (Primary)  Assessment & Plan:  Patient is easily irritated and admits to having a \"short fuse.\"  She is transferring care here from [facility].  We will start her on Lamictal and see her back in 1 month.    She tried and failed Abilify in the past.      Orders:  -     lamoTRIgine (LaMICtal) 25 MG tablet; Take 1 tablet by mouth Every Night for 14 days, THEN 2 tablets Every Night for 14 days.  Dispense: 42 tablet; Refill: 0    2. Attention deficit hyperactivity disorder (ADHD), combined type  Assessment & Plan:  When mood is stable we will consider treatment if symptoms persist.      We will discontinue Strattera.  He does not help.      3. Class 2 obesity due to excess calories without serious comorbidity with body mass index (BMI) of 39.0 to 39.9 in adult  Assessment & Plan:  Patient's (Body mass index is 39.67 kg/m².) indicates that they are " obese (BMI >30) with health conditions that include none . Weight is unchanged. BMI is is above average; BMI management plan is completed. We discussed portion control and increasing exercise.              Follow Up   Return in about 4 weeks (around 9/21/2022) for Recheck.  Patient was given instructions and counseling regarding her condition or for health maintenance advice. Please see specific information pulled into the AVS if appropriate.

## 2022-08-24 NOTE — ASSESSMENT & PLAN NOTE
"Patient is easily irritated and admits to having a \"short fuse.\"  She is transferring care here from [place].  We will start her on Lamictal and see her back in 1 month.    She tried and failed Abilify in the past.    "

## 2022-09-18 DIAGNOSIS — F39 MOOD DISORDER: ICD-10-CM

## 2022-09-19 RX ORDER — LAMOTRIGINE 25 MG/1
TABLET ORAL
Qty: 42 TABLET | Refills: 0 | OUTPATIENT
Start: 2022-09-19 | End: 2022-10-17

## 2022-09-21 ENCOUNTER — LAB (OUTPATIENT)
Dept: LAB | Facility: HOSPITAL | Age: 32
End: 2022-09-21

## 2022-09-21 ENCOUNTER — OFFICE VISIT (OUTPATIENT)
Dept: FAMILY MEDICINE CLINIC | Facility: CLINIC | Age: 32
End: 2022-09-21

## 2022-09-21 ENCOUNTER — TELEPHONE (OUTPATIENT)
Dept: FAMILY MEDICINE CLINIC | Facility: CLINIC | Age: 32
End: 2022-09-21

## 2022-09-21 VITALS
DIASTOLIC BLOOD PRESSURE: 104 MMHG | SYSTOLIC BLOOD PRESSURE: 144 MMHG | BODY MASS INDEX: 40.02 KG/M2 | TEMPERATURE: 98.2 F | WEIGHT: 240.2 LBS | RESPIRATION RATE: 16 BRPM | HEIGHT: 65 IN | HEART RATE: 87 BPM | OXYGEN SATURATION: 98 %

## 2022-09-21 DIAGNOSIS — F39 MOOD DISORDER: Primary | ICD-10-CM

## 2022-09-21 DIAGNOSIS — N91.2 AMENORRHEA: ICD-10-CM

## 2022-09-21 LAB — B-HCG UR QL: NEGATIVE

## 2022-09-21 PROCEDURE — 81025 URINE PREGNANCY TEST: CPT | Performed by: NURSE PRACTITIONER

## 2022-09-21 PROCEDURE — 99214 OFFICE O/P EST MOD 30 MIN: CPT | Performed by: NURSE PRACTITIONER

## 2022-09-21 RX ORDER — LEVONORGESTREL AND ETHINYL ESTRADIOL 0.1-0.02MG
1 KIT ORAL DAILY
COMMUNITY
Start: 2022-06-19

## 2022-09-21 RX ORDER — LAMOTRIGINE 25 MG/1
TABLET ORAL
Qty: 120 TABLET | Refills: 1 | Status: SHIPPED | OUTPATIENT
Start: 2022-09-21 | End: 2022-10-21

## 2022-09-21 NOTE — TELEPHONE ENCOUNTER
Mother called the office today whom is patient's guardian. She states she usually attends her daughters appointments but not able to today. She wanted provider to know that her daughter is on Lamictal twice at night and states within 12 hours, it has worn off. Patient anger and outburst are horrible. Does not want patient to know she called in today. Just wanted to notify provider about this seeing patient will not express this.

## 2022-09-21 NOTE — PROGRESS NOTES
"Chief Complaint  mood disorder (Patient states that she is here for a FU ) and Follow-up (Patient states that she has not had a period in over a month and is wanting to get a pregnancy test done today )    Subjective        Elisa Barrett presents to [facility]  History of Present Illness  mood disorder Patient states that she is here for a FU      Follow-up Patient states that she has not had a period in over a month and is wanting to get a pregnancy test done today            Objective   Vital Signs:  BP (!) 144/104 (BP Location: Right arm, Patient Position: Sitting, Cuff Size: Adult)   Pulse 87   Temp 98.2 °F (36.8 °C) (Temporal)   Resp 16   Ht 165.1 cm (65\")   Wt 109 kg (240 lb 3.2 oz)   SpO2 98%   BMI 39.97 kg/m²   Estimated body mass index is 39.97 kg/m² as calculated from the following:    Height as of this encounter: 165.1 cm (65\").    Weight as of this encounter: 109 kg (240 lb 3.2 oz).           Physical Exam  Constitutional:       Appearance: Normal appearance. She is well-developed.   HENT:      Head: Normocephalic and atraumatic.      Right Ear: External ear normal.      Left Ear: External ear normal.      Nose: Nose normal. No nasal tenderness or congestion.      Mouth/Throat:      Lips: Pink. No lesions.      Mouth: Mucous membranes are moist. No oral lesions.      Dentition: Normal dentition.      Pharynx: Oropharynx is clear. No pharyngeal swelling, oropharyngeal exudate or posterior oropharyngeal erythema.   Eyes:      General: Lids are normal. Vision grossly intact. No scleral icterus.        Right eye: No discharge.         Left eye: No discharge.      Extraocular Movements: Extraocular movements intact.      Conjunctiva/sclera: Conjunctivae normal.      Right eye: Right conjunctiva is not injected.      Left eye: Left conjunctiva is not injected.      Pupils: Pupils are equal, round, and reactive to light.   Cardiovascular:      Rate and Rhythm: Normal " rate and regular rhythm.      Heart sounds: Normal heart sounds. No murmur heard.    No gallop.   Pulmonary:      Effort: Pulmonary effort is normal.      Breath sounds: Normal breath sounds and air entry. No wheezing, rhonchi or rales.   Musculoskeletal:         General: No tenderness or deformity. Normal range of motion.      Cervical back: Full passive range of motion without pain, normal range of motion and neck supple.      Right lower leg: No edema.      Left lower leg: No edema.   Skin:     General: Skin is warm and dry.      Coloration: Skin is not jaundiced.      Findings: No rash.   Neurological:      Mental Status: She is alert and oriented to person, place, and time.      Cranial Nerves: Cranial nerves are intact.      Sensory: Sensation is intact.      Motor: Motor function is intact.      Coordination: Coordination is intact.      Gait: Gait is intact.   Psychiatric:         Attention and Perception: Attention normal.         Mood and Affect: Mood and affect normal.         Behavior: Behavior is not hyperactive. Behavior is cooperative.         Thought Content: Thought content normal.         Judgment: Judgment normal.        Result Review :                Assessment and Plan   Diagnoses and all orders for this visit:    1. Mood disorder (HCC) (Primary)  -     lamoTRIgine (LaMICtal) 25 MG tablet; Take 1 tablet q am and 2 qhs x 2 weeks then 2 tablets bid  Dispense: 120 tablet; Refill: 1    2. Amenorrhea  -     Pregnancy, Urine - Urine, Clean Catch    Patient comes in today to follow-up on mood disorder.  She was started on Lamictal at last visit.  There is still room for improvement.  We will increase the dose and split it to where she takes some in the morning and some at night.    Patient has not had a period in a month.  She did just get told by her mother that she messed up on some of her pills so that is likely why she has not.  We will go ahead and proceed with a pregnancy test to ensure that this  is negative.    The following information was discussed with patient/caregiver at the time of the appointment.    Lamictal (lamotrigine):  Rarely, serious (sometimes fatal) skin rashes have occurred while taking this medication. These rashes are more common in children under 16 than in adults. Rashes may be more likely if you start at too high a dose, if you increase your dose too quickly, or if you take this medication with certain other anti-seizure medications (valproic acid, divalproex). These rashes may occur anytime during use, but most serious rashes have occurred within 2 to 8 weeks of starting lamotrigine. Get medical help right away if you develop any type of skin rash while taking this medication, or if you have other signs of a serious allergic reaction such as hives, fever, swollen lymph glands, painful sores in the mouth or around the eyes, or swelling of the lips or tongue. Your doctor will tell you if you should stop taking lamotrigine. Even after you stop taking this medication, it is still possible for the rash to become life-threatening or cause permanent scars or other problems.    Uses    Lamotrigine is used alone or with other medications to prevent and control seizures. It may also be used to help prevent the extreme mood swings of bipolar disorder in adults and children. Lamotrigine is known as an anticonvulsant or antiepileptic drug. It is thought to work by restoring the balance of certain natural substances in the brain.    How to Use    Read the Medication Guide and, if available, the Patient Information Leaflet provided by your pharmacist before you start taking lamotrigine and each time you get a refill. If you have any questions, ask your doctor or pharmacist. Take this medication by mouth with or without food as directed by your doctor. Swallow the tablets whole since chewing them may leave a bitter taste. Dosage is based on your medical condition, response to treatment, and use of  certain interacting drugs. (See also Drug Interactions section.) For children, the dosage is also based on weight. It is very important to follow your doctor's dosing instructions exactly. The dose must be increased slowly. It may take several weeks or months to reach the best dose for you and to get the full benefit from this medication. Take this medication regularly in order to get the most benefit from it. To help you remember, take it at the same time(s) each day. Do not stop taking this medication without consulting your doctor. Some conditions may become worse when the drug is suddenly stopped. Your dose may need to be gradually decreased. Also, if you have stopped taking this medication, do not restart lamotrigine without consulting your doctor. Tell your doctor if your condition does not improve or if it worsens.    Side Effects    See also Warning section. Dizziness, drowsiness, headache, blurred/double vision, loss of coordination, shaking (tremor), nausea, vomiting, or upset stomach may occur. If any of these effects persist or worsen, tell your doctor or pharmacist promptly. Remember that your doctor has prescribed this medication because he or she has judged that the benefit to you is greater than the risk of side effects. Many people using this medication do not have serious side effects. A small number of people who take anticonvulsants for any condition (such as seizures, bipolar disorder, pain) may experience depression, suicidal thoughts/attempts, or other mental/mood problems. Tell your doctor right away if you or your family/caregiver notice any unusual/sudden changes in your mood, thoughts, or behavior including signs of depression, suicidal thoughts/attempts, thoughts about harming yourself. Tell your doctor right away if any of these rare but serious side effects occur: fainting, easy or unusual bruising/bleeding, unusual tiredness, signs of infection (such as fever, stiff neck, persistent  sore throat), muscle pain/tenderness/weakness, dark urine, yellowing eyes/skin, stomach/abdominal pain, persistent nausea/vomiting, change in the amount of urine. A very serious allergic reaction to this drug is rare. However, get medical help right away if you notice any symptoms of a serious allergic reaction, including: rash, itching/swelling (especially of the face/tongue/throat), severe dizziness, trouble breathing. This is not a complete list of possible side effects. If you notice other effects not listed above, contact your doctor or pharmacist. In the [place] - Call your doctor for medical advice about side effects. You may report side effects to FDA at 4-162-ROP-5357. In [place] - Call your doctor for medical advice about side effects. You may report side effects to [facility] [place] at 1-711.544.6491.    Precautions    Before taking lamotrigine, tell your doctor or pharmacist if you are allergic to it; or if you have any other allergies. This product may contain inactive ingredients, which can cause allergic reactions or other problems. Talk to your pharmacist for more details. Before using this medication, tell your doctor or pharmacist your medical history, especially of: kidney disease, liver disease. This drug may make you dizzy or drowsy or cause blurred vision. Do not drive, use machinery, or do any activity that requires alertness or clear vision until you are sure you can perform such activities safely. Limit alcoholic beverages. Before having surgery, tell your doctor or dentist about all the products you use (including prescription drugs, nonprescription drugs, and herbal products). Older adults may be more sensitive to the side effects of this drug, especially dizziness, loss of coordination, or fainting. These side effects can increase the risk of falling. During pregnancy, this medication should be used only when clearly needed. It may harm an unborn baby. However, since untreated seizures or  mental/mood problems (such as bipolar disorder) are serious conditions that can harm both a pregnant woman and her unborn baby, do not stop taking this medication unless directed by your doctor. If you are planning pregnancy, become pregnant, or think you may be pregnant, immediately talk to your doctor about the benefits and risks of using this medication during pregnancy. Since birth control pills, patches, implants, and injections may not work if taken with this medication (see also Drug Interactions section), discuss reliable forms of birth control with your doctor. This drug passes into breast milk and may have undesirable effects on a nursing infant. Consult your doctor before breast-feeding.    Drug Interaction    Drug interactions may change how your medications work or increase your risk for serious side effects. This document does not contain all possible drug interactions. Keep a list of all the products you use (including prescription/nonprescription drugs and herbal products) and share it with your doctor and pharmacist. Do not start, stop, or change the dosage of any medicines without your doctor's approval. Other medications can affect the removal of lamotrigine from your body, which may affect how lamotrigine works. Examples include hormonal birth control (such as pills, patches), estrogens, other medications to treat seizures (such as carbamazepine, phenobarbital, phenytoin, primidone, valproic acid), certain HIV protease inhibitors (such as lopinavir/ritonavir, atazanavir/ritonavir), and rifampin, among others. Your doctor may need to adjust your dose of lamotrigine if you are on these medications. This medication may decrease the effectiveness of hormonal birth control products (such as pills, patch, ring). This effect can result in pregnancy. Ask your doctor or pharmacist for details. Discuss whether you should use additional reliable birth control methods while using this medication. Also tell  your doctor if you have any new spotting or breakthrough bleeding, because these may be signs that your birth control is not working well. Tell your doctor or pharmacist if you are taking other products that cause drowsiness including alcohol, antihistamines (such as cetirizine, diphenhydramine), drugs for sleep or anxiety (such as alprazolam, diazepam, zolpidem), muscle relaxants, and narcotic pain relievers (such as codeine). Check the labels on all your medicines (such as allergy or cough-and-cold products) because they may contain ingredients that cause drowsiness. Ask your pharmacist about using those products safely. This medication may interfere with certain laboratory tests (including urine drug screening tests), possibly causing false test results. Make sure laboratory personnel and all your doctors know you use this drug.    Overdose    If overdose is suspected, contact a poison control center or emergency room immediately. [place] residents can call their local poison control center at 1-207.893.3043. [place] residents can call a provincial poison control center. Symptoms of overdose may include: severe drowsiness, unusual eye movements, loss of consciousness.    Notes    Do not share this medication with others. Laboratory and/or medical tests (such as liver and kidney function tests, complete blood count) may be performed periodically to monitor your progress or check for side effects. Consult your doctor for more details. There are different types of this medication available. Some do not have the same effects. There are also some medications that sound the same as this product. Make sure you have the right product before taking it.    Missed Dose    It is important to take each dose at the scheduled time. If you miss a dose, take it as soon as you remember. If it is near the time of the next dose, skip the missed dose and resume your usual dosing schedule. Do not double the dose to catch  up.    Storage    Store at room temperature away from light and moisture. Do not store in the bathroom. Keep all medications away from children and pets. Do not flush medications down the toilet or pour them into a drain unless instructed to do so. Properly discard this product when it is  or no longer needed. Consult your pharmacist or local waste disposal company.    Medical Alert    Your condition can cause complications in a medical emergency. For information about enrolling in MedicAlert, call 1-887.213.6177 ([place]) or [phone] ([place]).    Disclaimer    IMPORTANT: HOW TO USE THIS INFORMATION: This is a summary and does NOT have all possible information about this product. This information does not assure that this product is safe, effective, or appropriate for you. This information is not individual medical advice and does not substitute for the advice of your health care professional. Always ask your health care professional for complete information about this product and your specific health needs.    Source  [employer].           Follow Up   Return in about 4 weeks (around 10/19/2022).  Patient was given instructions and counseling regarding her condition or for health maintenance advice. Please see specific information pulled into the AVS if appropriate.

## 2022-09-22 ENCOUNTER — TELEPHONE (OUTPATIENT)
Dept: FAMILY MEDICINE CLINIC | Facility: CLINIC | Age: 32
End: 2022-09-22

## 2022-10-21 ENCOUNTER — OFFICE VISIT (OUTPATIENT)
Dept: FAMILY MEDICINE CLINIC | Facility: CLINIC | Age: 32
End: 2022-10-21

## 2022-10-21 VITALS
HEART RATE: 77 BPM | OXYGEN SATURATION: 98 % | HEIGHT: 65 IN | WEIGHT: 240.5 LBS | BODY MASS INDEX: 40.07 KG/M2 | DIASTOLIC BLOOD PRESSURE: 86 MMHG | TEMPERATURE: 98.2 F | SYSTOLIC BLOOD PRESSURE: 126 MMHG

## 2022-10-21 DIAGNOSIS — F39 MOOD DISORDER: Primary | ICD-10-CM

## 2022-10-21 DIAGNOSIS — E66.01 CLASS 3 SEVERE OBESITY DUE TO EXCESS CALORIES WITHOUT SERIOUS COMORBIDITY WITH BODY MASS INDEX (BMI) OF 40.0 TO 44.9 IN ADULT: ICD-10-CM

## 2022-10-21 PROCEDURE — 99213 OFFICE O/P EST LOW 20 MIN: CPT

## 2022-10-21 RX ORDER — LAMOTRIGINE 100 MG/1
200 TABLET ORAL DAILY
Qty: 60 TABLET | Refills: 0 | Status: SHIPPED | OUTPATIENT
Start: 2022-10-21 | End: 2022-11-21 | Stop reason: SDUPTHER

## 2022-10-21 NOTE — PATIENT INSTRUCTIONS
Discharge Instructions - Mood Disorder Follow Up     - You will need to return to the office as instructed for follow up, or sooner if you develop symptoms  - Medication should be taken first thing in the morning  - It is your responsibility to safe guard your medication  - If you develop any symptoms such as headache, changes in weight, loss of appetite, chest pain, palpitations, or change in behavior, please contact our office immediately or go to your local emergency rooms for any emergent needs.  - Your next appointment will be walk in visit.  Dr. Aragon is here Monday-Thursdays, and you will need to be signed in by 3 pm in order to guarantee your prescription is filled that day.  You may be seen on Fridays or Saturdays for medication follow up as well.

## 2022-11-17 DIAGNOSIS — F39 MOOD DISORDER: ICD-10-CM

## 2022-11-17 RX ORDER — LAMOTRIGINE 100 MG/1
TABLET ORAL
Qty: 60 TABLET | Refills: 0 | OUTPATIENT
Start: 2022-11-17

## 2022-11-21 ENCOUNTER — OFFICE VISIT (OUTPATIENT)
Dept: FAMILY MEDICINE CLINIC | Facility: CLINIC | Age: 32
End: 2022-11-21

## 2022-11-21 VITALS
SYSTOLIC BLOOD PRESSURE: 137 MMHG | BODY MASS INDEX: 40.32 KG/M2 | TEMPERATURE: 97 F | HEART RATE: 76 BPM | OXYGEN SATURATION: 97 % | DIASTOLIC BLOOD PRESSURE: 77 MMHG | WEIGHT: 242 LBS | HEIGHT: 65 IN

## 2022-11-21 DIAGNOSIS — F39 MOOD DISORDER: Primary | ICD-10-CM

## 2022-11-21 DIAGNOSIS — F90.2 ATTENTION DEFICIT HYPERACTIVITY DISORDER (ADHD), COMBINED TYPE: ICD-10-CM

## 2022-11-21 PROCEDURE — 99214 OFFICE O/P EST MOD 30 MIN: CPT | Performed by: NURSE PRACTITIONER

## 2022-11-21 RX ORDER — LAMOTRIGINE 100 MG/1
200 TABLET ORAL DAILY
Qty: 60 TABLET | Refills: 1 | Status: SHIPPED | OUTPATIENT
Start: 2022-11-21 | End: 2022-12-19 | Stop reason: SDUPTHER

## 2022-11-21 NOTE — PROGRESS NOTES
"Chief Complaint  Follow-up and Mood disorder    Subjective    History of Present Illness      Patient presents to [facility] for   History of Present Illness  Pt states wanting to try a new medication for moods and anxiety.         Review of Systems    I have reviewed and agree with the HPI and ROS information as above.  Jaleesa CisseANNETTE Hobbs     Objective   Vital Signs:   /77 (BP Location: Right arm, Patient Position: Sitting, Cuff Size: Adult)   Pulse 76   Temp 97 °F (36.1 °C) (Temporal)   Ht 165.1 cm (65\")   Wt 110 kg (242 lb)   SpO2 97%   BMI 40.27 kg/m²       Physical Exam  Constitutional:       Appearance: Normal appearance. She is well-developed.   HENT:      Head: Normocephalic and atraumatic.      Right Ear: External ear normal.      Left Ear: External ear normal.      Nose: Nose normal. No nasal tenderness or congestion.      Mouth/Throat:      Lips: Pink. No lesions.      Mouth: Mucous membranes are moist. No oral lesions.      Dentition: Normal dentition.      Pharynx: Oropharynx is clear. No pharyngeal swelling, oropharyngeal exudate or posterior oropharyngeal erythema.   Eyes:      General: Lids are normal. Vision grossly intact. No scleral icterus.        Right eye: No discharge.         Left eye: No discharge.      Extraocular Movements: Extraocular movements intact.      Conjunctiva/sclera: Conjunctivae normal.      Right eye: Right conjunctiva is not injected.      Left eye: Left conjunctiva is not injected.      Pupils: Pupils are equal, round, and reactive to light.   Cardiovascular:      Rate and Rhythm: Normal rate and regular rhythm.      Heart sounds: Normal heart sounds. No murmur heard.    No gallop.   Pulmonary:      Effort: Pulmonary effort is normal.      Breath sounds: Normal breath sounds and air entry. No wheezing, rhonchi or rales.   Musculoskeletal:         General: No tenderness or deformity. Normal range of motion.      " Cervical back: Full passive range of motion without pain, normal range of motion and neck supple.      Right lower leg: No edema.      Left lower leg: No edema.   Skin:     General: Skin is warm and dry.      Coloration: Skin is not jaundiced.      Findings: No rash.   Neurological:      Mental Status: She is alert and oriented to person, place, and time.      Cranial Nerves: Cranial nerves are intact.      Sensory: Sensation is intact.      Motor: Motor function is intact.      Coordination: Coordination is intact.      Gait: Gait is intact.   Psychiatric:         Attention and Perception: Attention normal.         Mood and Affect: Mood and affect normal.         Behavior: Behavior is not hyperactive. Behavior is cooperative.         Thought Content: Thought content normal.         Judgment: Judgment normal.          SANIA-7: Over the last two weeks, how often have you been bothered by the following problems?  Feeling nervous, anxious or on edge: More than half the days  Not being able to stop or control worrying: Several days  Worrying too much about different things: Not at all  Trouble Relaxing: Not at all  Being so restless that it is hard to sit still: Not at all  Becoming easily annoyed or irritable: Nearly every day  Feeling afraid as if something awful might happen: Not at all  SANIA 7 Total Score: 6  If you checked any problems, how difficult have these problems made it for you to do your work, take care of things at home, or get along with other people: Somewhat difficult    PHQ-2 Depression Screening  Little interest or pleasure in doing things? 0-->not at all   Feeling down, depressed, or hopeless?     PHQ-2 Total Score 0     PHQ-9 Depression Screening  Little interest or pleasure in doing things? 0-->not at all   Feeling down, depressed, or hopeless?     Trouble falling or staying asleep, or sleeping too much?     Feeling tired or having little energy?     Poor appetite or overeating?     Feeling bad about  yourself - or that you are a failure or have let yourself or your family down?     Trouble concentrating on things, such as reading the newspaper or watching television?     Moving or speaking so slowly that other people could have noticed? Or the opposite - being so fidgety or restless that you have been moving around a lot more than usual?     Thoughts that you would be better off dead, or of hurting yourself in some way?     PHQ-9 Total Score 0   If you checked off any problems, how difficult have these problems made it for you to do your work, take care of things at home, or get along with other people?        Result Review  Data Reviewed:                   Assessment and Plan      Diagnoses and all orders for this visit:    1. Mood disorder (HCC) (Primary)  -     Cariprazine HCl (Vraylar) 1.5 MG capsule capsule; Take 1 capsule by mouth Daily.  Dispense: 30 capsule; Refill: 1  -     lamoTRIgine (LaMICtal) 100 MG tablet; Take 2 tablets by mouth Daily for 30 days.  Dispense: 60 tablet; Refill: 1    2. Attention deficit hyperactivity disorder (ADHD), combined type    Patient comes in today to follow-up on mood.  She does not feel that increasing the Lamictal did enough for her mood.  She still gets irritated very easily.  She is requesting to try Vraylar.  That is what her mother is on and does well on it.  We will give samples of the same.  Follow-up in 1 month or sooner with worsening symptoms.  No suicidal or homicidal thoughts or plans.      Follow Up   Return in about 4 weeks (around 12/19/2022).  Patient was given instructions and counseling regarding her condition or for health maintenance advice. Please see specific information pulled into the AVS if appropriate.

## 2022-11-22 ENCOUNTER — TELEPHONE (OUTPATIENT)
Dept: FAMILY MEDICINE CLINIC | Facility: CLINIC | Age: 32
End: 2022-11-22

## 2022-11-22 NOTE — TELEPHONE ENCOUNTER
Caller: Elisa Barrett    Relationship: Self    Best call back number: [phone]    What is the best time to reach you: ANYTIME    Who are you requesting to speak with (clinical staff, provider,  specific staff member): CLINICAL    What was the call regarding: PATIENT CALLED IN STATING SHE HAD PICKED UP VRAYLAR. ALSO NEEDING CLARIFICATION ON DIRECTIONS FOR CERTAIN MEDICATIONS. EXPRESSED URGENCY IN NEEDING TO KNOW AS SOON AS POSSIBLE    Do you require a callback: YES

## 2022-11-22 NOTE — TELEPHONE ENCOUNTER
Returned the patient's phone call and she states she  the vraylar and she wants to know if she is suppose to take 1 daily like it says on the bottle. Informed her yes take 1 vraylar daily. She asked how long it takes to work. Informed her give it a month and then we will see her back for for follow up appointment on 12/19/22. IRISH

## 2022-12-19 ENCOUNTER — OFFICE VISIT (OUTPATIENT)
Dept: FAMILY MEDICINE CLINIC | Facility: CLINIC | Age: 32
End: 2022-12-19

## 2022-12-19 VITALS
DIASTOLIC BLOOD PRESSURE: 82 MMHG | BODY MASS INDEX: 40.98 KG/M2 | HEART RATE: 89 BPM | WEIGHT: 246 LBS | HEIGHT: 65 IN | SYSTOLIC BLOOD PRESSURE: 130 MMHG

## 2022-12-19 DIAGNOSIS — F39 MOOD DISORDER: ICD-10-CM

## 2022-12-19 PROCEDURE — 99214 OFFICE O/P EST MOD 30 MIN: CPT | Performed by: NURSE PRACTITIONER

## 2022-12-19 RX ORDER — LAMOTRIGINE 100 MG/1
200 TABLET ORAL DAILY
Qty: 60 TABLET | Refills: 1 | Status: SHIPPED | OUTPATIENT
Start: 2022-12-19 | End: 2023-01-19 | Stop reason: SDUPTHER

## 2022-12-19 NOTE — PROGRESS NOTES
"Chief Complaint  mood disorder    Subjective    History of Present Illness      Patient presents to [facility] for   History of Present Illness  Pt states that she is here for a f/u with mood disorder and says her mood is better but she would like to discuss increasing her medications. Pt states that she get irritated easily. Pt states that she sleep good at night.        Review of Systems    I have reviewed and agree with the HPI information as above.  Yesenia Hines, APRN     Objective   Vital Signs:   /82   Pulse 89   Ht 165.1 cm (65\")   Wt 112 kg (246 lb)   BMI 40.94 kg/m²     Class 3 Severe Obesity (BMI >=40). Obesity-related health conditions include the following: none. Obesity is worsening. BMI is is above average; BMI management plan is completed. We discussed portion control and increasing exercise.      Physical Exam  Vitals and nursing note reviewed.   Constitutional:       Appearance: Normal appearance. She is well-developed. She is obese.   HENT:      Head: Normocephalic and atraumatic.      Right Ear: Tympanic membrane, ear canal and external ear normal.      Left Ear: Tympanic membrane, ear canal and external ear normal.      Nose: Nose normal. No septal deviation, nasal tenderness or congestion.      Mouth/Throat:      Lips: Pink. No lesions.      Mouth: Mucous membranes are moist. No oral lesions.      Dentition: Normal dentition.      Pharynx: Oropharynx is clear. No pharyngeal swelling, oropharyngeal exudate or posterior oropharyngeal erythema.   Eyes:      General: Lids are normal. Vision grossly intact. No scleral icterus.        Right eye: No discharge.         Left eye: No discharge.      Extraocular Movements: Extraocular movements intact.      Conjunctiva/sclera: Conjunctivae normal.      Right eye: Right conjunctiva is not injected.      Left eye: Left conjunctiva is not injected.      Pupils: Pupils are equal, round, and reactive to light. "   Neck:      Thyroid: No thyroid mass.      Trachea: Trachea normal.   Cardiovascular:      Rate and Rhythm: Normal rate and regular rhythm.      Heart sounds: Normal heart sounds. No murmur heard.    No gallop.   Pulmonary:      Effort: Pulmonary effort is normal.      Breath sounds: Normal breath sounds and air entry. No wheezing, rhonchi or rales.   Musculoskeletal:         General: No tenderness or deformity. Normal range of motion.      Cervical back: Full passive range of motion without pain, normal range of motion and neck supple.      Thoracic back: Normal.      Right lower leg: No edema.      Left lower leg: No edema.   Skin:     General: Skin is warm and dry.      Coloration: Skin is not jaundiced.      Findings: No rash.   Neurological:      Mental Status: She is alert and oriented to person, place, and time.      Cranial Nerves: Cranial nerves are intact.      Sensory: Sensation is intact.      Motor: Motor function is intact.      Coordination: Coordination is intact.      Gait: Gait is intact.      Deep Tendon Reflexes: Reflexes are normal and symmetric.   Psychiatric:         Mood and Affect: Mood and affect normal.         Behavior: Behavior normal.         Judgment: Judgment normal.               Result Review  Data Reviewed:                   Assessment and Plan      Diagnoses and all orders for this visit:    1. Mood disorder (HCC)  -     Cariprazine HCl (Vraylar) 3 MG capsule capsule; Take 1 capsule by mouth Daily.  Dispense: 30 capsule; Refill: 1  -     lamoTRIgine (LaMICtal) 100 MG tablet; Take 2 tablets by mouth Daily for 30 days.  Dispense: 60 tablet; Refill: 1    States that she is still getting irritable and easily agitated.  She wants to increase her Vraylar to 3 mg at this time.  She did also discussing changing the dose of her Lamictal.  However recommended at this time to keep Lamictal the same and increase the Vraylar only.  Patient is to follow-up in 1 month.        Follow Up   Return  in about 1 month (around 1/19/2023) for Mood follow up.  Patient was given instructions and counseling regarding her condition or for health maintenance advice. Please see specific information pulled into the AVS if appropriate.

## 2023-01-19 ENCOUNTER — OFFICE VISIT (OUTPATIENT)
Dept: FAMILY MEDICINE CLINIC | Facility: CLINIC | Age: 33
End: 2023-01-19
Payer: MEDICARE

## 2023-01-19 VITALS
SYSTOLIC BLOOD PRESSURE: 115 MMHG | DIASTOLIC BLOOD PRESSURE: 79 MMHG | TEMPERATURE: 97.9 F | BODY MASS INDEX: 41.62 KG/M2 | WEIGHT: 249.8 LBS | OXYGEN SATURATION: 100 % | HEART RATE: 80 BPM | HEIGHT: 65 IN | RESPIRATION RATE: 18 BRPM

## 2023-01-19 DIAGNOSIS — F39 MOOD DISORDER: ICD-10-CM

## 2023-01-19 PROCEDURE — 99214 OFFICE O/P EST MOD 30 MIN: CPT | Performed by: NURSE PRACTITIONER

## 2023-01-19 RX ORDER — LAMOTRIGINE 100 MG/1
200 TABLET ORAL DAILY
Qty: 60 TABLET | Refills: 2 | Status: SHIPPED | OUTPATIENT
Start: 2023-01-19 | End: 2023-02-18

## 2023-01-19 NOTE — PROGRESS NOTES
"Chief Complaint  Mood disorder (Patient states that she is here for med check and refill )    Subjective        Elisa Barrett presents to [facility]  History of Present Illness  Mood disorder Patient states that she is here for med check and refill           Objective   Vital Signs:  /79 (BP Location: Left arm, Patient Position: Sitting, Cuff Size: Adult)   Pulse 80   Temp 97.9 °F (36.6 °C) (Temporal)   Resp 18   Ht 165.1 cm (65\")   Wt 113 kg (249 lb 12.8 oz)   SpO2 100%   BMI 41.57 kg/m²   Estimated body mass index is 41.57 kg/m² as calculated from the following:    Height as of this encounter: 165.1 cm (65\").    Weight as of this encounter: 113 kg (249 lb 12.8 oz).       Class 3 Severe Obesity (BMI >=40). Obesity-related health conditions include the following: none. Obesity is unchanged. BMI is is above average; BMI management plan is completed. We discussed portion control and increasing exercise.      Physical Exam  Vitals and nursing note reviewed.   Constitutional:       Appearance: Normal appearance. She is well-developed. She is obese.   HENT:      Head: Normocephalic and atraumatic.      Right Ear: Tympanic membrane, ear canal and external ear normal.      Left Ear: Tympanic membrane, ear canal and external ear normal.      Nose: Nose normal. No septal deviation, nasal tenderness or congestion.      Mouth/Throat:      Lips: Pink. No lesions.      Mouth: Mucous membranes are moist. No oral lesions.      Dentition: Normal dentition.      Pharynx: Oropharynx is clear. No pharyngeal swelling, oropharyngeal exudate or posterior oropharyngeal erythema.   Eyes:      General: Lids are normal. Vision grossly intact. No scleral icterus.        Right eye: No discharge.         Left eye: No discharge.      Extraocular Movements: Extraocular movements intact.      Conjunctiva/sclera: Conjunctivae normal.      Right eye: Right conjunctiva is not injected.      Left eye: " Left conjunctiva is not injected.      Pupils: Pupils are equal, round, and reactive to light.   Neck:      Thyroid: No thyroid mass.      Trachea: Trachea normal.   Cardiovascular:      Rate and Rhythm: Normal rate and regular rhythm.      Heart sounds: Normal heart sounds. No murmur heard.    No gallop.   Pulmonary:      Effort: Pulmonary effort is normal.      Breath sounds: Normal breath sounds and air entry. No wheezing, rhonchi or rales.   Musculoskeletal:         General: No tenderness or deformity. Normal range of motion.      Cervical back: Full passive range of motion without pain, normal range of motion and neck supple.      Thoracic back: Normal.      Right lower leg: No edema.      Left lower leg: No edema.   Skin:     General: Skin is warm and dry.      Coloration: Skin is not jaundiced.      Findings: No rash.   Neurological:      Mental Status: She is alert and oriented to person, place, and time.      Sensory: Sensation is intact.      Motor: Motor function is intact.      Coordination: Coordination is intact.      Gait: Gait is intact.      Deep Tendon Reflexes: Reflexes are normal and symmetric.   Psychiatric:         Mood and Affect: Mood and affect normal.         Behavior: Behavior normal.         Judgment: Judgment normal.        Result Review :                   Assessment and Plan   Diagnoses and all orders for this visit:    1. Mood disorder (HCC)  -     Cariprazine HCl (Vraylar) 3 MG capsule capsule; Take 1 capsule by mouth Daily.  Dispense: 30 capsule; Refill: 2  -     lamoTRIgine (LaMICtal) 100 MG tablet; Take 2 tablets by mouth Daily for 30 days.  Dispense: 60 tablet; Refill: 2             Follow Up   No follow-ups on file.  Patient was given instructions and counseling regarding her condition or for health maintenance advice. Please see specific information pulled into the AVS if appropriate.

## 2023-02-14 ENCOUNTER — APPOINTMENT (OUTPATIENT)
Dept: CT IMAGING | Age: 33
End: 2023-02-14
Payer: OTHER MISCELLANEOUS

## 2023-02-14 ENCOUNTER — HOSPITAL ENCOUNTER (EMERGENCY)
Age: 33
Discharge: HOME | End: 2023-02-14
Attending: EMERGENCY MEDICINE
Payer: OTHER MISCELLANEOUS

## 2023-02-14 VITALS
SYSTOLIC BLOOD PRESSURE: 143 MMHG | HEIGHT: 66 IN | OXYGEN SATURATION: 96 % | HEART RATE: 75 BPM | TEMPERATURE: 98.1 F | WEIGHT: 244 LBS | RESPIRATION RATE: 19 BRPM | BODY MASS INDEX: 39.21 KG/M2 | DIASTOLIC BLOOD PRESSURE: 109 MMHG
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
## 2023-02-14 DIAGNOSIS — S39.012A BACK STRAIN, INITIAL ENCOUNTER: ICD-10-CM

## 2023-02-14 LAB — HCG(URINE) PREGNANCY TEST: NEGATIVE

## 2023-02-14 PROCEDURE — 72125 CT NECK SPINE W/O DYE: CPT

## 2023-02-14 PROCEDURE — 72131 CT LUMBAR SPINE W/O DYE: CPT

## 2023-02-14 PROCEDURE — 70450 CT HEAD/BRAIN W/O DYE: CPT

## 2023-02-14 PROCEDURE — 6370000000 HC RX 637 (ALT 250 FOR IP): Performed by: EMERGENCY MEDICINE

## 2023-02-14 PROCEDURE — 72128 CT CHEST SPINE W/O DYE: CPT

## 2023-02-14 PROCEDURE — 99284 EMERGENCY DEPT VISIT MOD MDM: CPT

## 2023-02-14 PROCEDURE — 84703 CHORIONIC GONADOTROPIN ASSAY: CPT

## 2023-02-14 RX ORDER — LEVONORGESTREL AND ETHINYL ESTRADIOL 0.1-0.02MG
1 KIT ORAL DAILY
COMMUNITY
Start: 2022-06-19

## 2023-02-14 RX ORDER — LAMOTRIGINE 100 MG/1
200 TABLET ORAL DAILY
COMMUNITY
Start: 2023-01-19 | End: 2023-02-19

## 2023-02-14 RX ORDER — IBUPROFEN 200 MG
600 TABLET ORAL ONCE
Status: COMPLETED | OUTPATIENT
Start: 2023-02-14 | End: 2023-02-14

## 2023-02-14 RX ADMIN — IBUPROFEN 600 MG: 200 TABLET, FILM COATED ORAL at 23:02

## 2023-02-14 ASSESSMENT — PAIN - FUNCTIONAL ASSESSMENT: PAIN_FUNCTIONAL_ASSESSMENT: 0-10

## 2023-02-14 ASSESSMENT — PAIN DESCRIPTION - LOCATION
LOCATION: BACK
LOCATION: NECK

## 2023-02-14 ASSESSMENT — PAIN DESCRIPTION - FREQUENCY: FREQUENCY: CONTINUOUS

## 2023-02-14 ASSESSMENT — PAIN DESCRIPTION - DESCRIPTORS: DESCRIPTORS: THROBBING;ACHING

## 2023-02-14 ASSESSMENT — PAIN DESCRIPTION - ORIENTATION: ORIENTATION: MID

## 2023-02-14 ASSESSMENT — PAIN SCALES - GENERAL
PAINLEVEL_OUTOF10: 7
PAINLEVEL_OUTOF10: 7
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
## 2023-02-28 ASSESSMENT — ENCOUNTER SYMPTOMS
BACK PAIN: 1
ABDOMINAL PAIN: 0

## 2023-04-17 ENCOUNTER — OFFICE VISIT (OUTPATIENT)
Dept: FAMILY MEDICINE CLINIC | Facility: CLINIC | Age: 33
End: 2023-04-17
Payer: MEDICARE

## 2023-04-17 VITALS
DIASTOLIC BLOOD PRESSURE: 88 MMHG | BODY MASS INDEX: 42.82 KG/M2 | SYSTOLIC BLOOD PRESSURE: 139 MMHG | HEIGHT: 65 IN | HEART RATE: 81 BPM | WEIGHT: 257 LBS | OXYGEN SATURATION: 97 %

## 2023-04-17 DIAGNOSIS — F39 MOOD DISORDER: Primary | ICD-10-CM

## 2023-04-17 DIAGNOSIS — E66.01 CLASS 3 SEVERE OBESITY DUE TO EXCESS CALORIES WITHOUT SERIOUS COMORBIDITY WITH BODY MASS INDEX (BMI) OF 40.0 TO 44.9 IN ADULT: ICD-10-CM

## 2023-04-17 PROCEDURE — 1159F MED LIST DOCD IN RCRD: CPT

## 2023-04-17 PROCEDURE — 1160F RVW MEDS BY RX/DR IN RCRD: CPT

## 2023-04-17 PROCEDURE — 99214 OFFICE O/P EST MOD 30 MIN: CPT

## 2023-04-17 RX ORDER — LAMOTRIGINE 100 MG/1
200 TABLET ORAL DAILY
Qty: 60 TABLET | Refills: 2 | Status: SHIPPED | OUTPATIENT
Start: 2023-04-17

## 2023-04-17 NOTE — PROGRESS NOTES
"Chief Complaint  Mood Disorder    Subjective    History of Present Illness      Patient presents to [facility] for   History of Present Illness  Pt is here today for 3 month mood f/u. Pt reports no problems or concerns at this time, wishes to continue same.       Review of Systems    I have reviewed and agree with the HPI information as above.  Vanessa MCCOLLUM Khmer, APRN     Objective   Vital Signs:   /88   Pulse 81   Ht 165.1 cm (65\")   Wt 117 kg (257 lb)   SpO2 97%   BMI 42.77 kg/m²     Class 3 Severe Obesity (BMI >=40). Obesity-related health conditions include the following: none. Obesity is unchanged. BMI is is above average; BMI management plan is completed. We discussed portion control, increasing exercise and pharmacologic options including Metformin.      Physical Exam  Vitals and nursing note reviewed.   Constitutional:       General: She is not in acute distress.     Appearance: Normal appearance. She is not ill-appearing.   HENT:      Head: Normocephalic and atraumatic.      Right Ear: External ear normal.      Left Ear: External ear normal.      Nose: Nose normal.   Eyes:      Conjunctiva/sclera: Conjunctivae normal.   Cardiovascular:      Rate and Rhythm: Normal rate and regular rhythm.      Pulses: Normal pulses.      Heart sounds: Normal heart sounds.   Pulmonary:      Effort: Pulmonary effort is normal.      Breath sounds: Normal breath sounds.   Skin:     General: Skin is warm and dry.   Neurological:      Mental Status: She is alert and oriented to person, place, and time. Mental status is at baseline.      GCS: GCS eye subscore is 4. GCS verbal subscore is 5. GCS motor subscore is 6.   Psychiatric:         Mood and Affect: Mood normal.         Behavior: Behavior normal.         Thought Content: Thought content normal.         Judgment: Judgment normal.          SANIA-7:      PHQ-2 Depression Screening  Little interest or pleasure in doing things?     Feeling " down, depressed, or hopeless?     PHQ-2 Total Score       PHQ-9 Depression Screening  Little interest or pleasure in doing things?     Feeling down, depressed, or hopeless?     Trouble falling or staying asleep, or sleeping too much?     Feeling tired or having little energy?     Poor appetite or overeating?     Feeling bad about yourself - or that you are a failure or have let yourself or your family down?     Trouble concentrating on things, such as reading the newspaper or watching television?     Moving or speaking so slowly that other people could have noticed? Or the opposite - being so fidgety or restless that you have been moving around a lot more than usual?     Thoughts that you would be better off dead, or of hurting yourself in some way?     PHQ-9 Total Score     If you checked off any problems, how difficult have these problems made it for you to do your work, take care of things at home, or get along with other people?        Result Review  Data Reviewed:                   Assessment and Plan      Diagnoses and all orders for this visit:    1. Mood disorder (Primary)  -     Cariprazine HCl (Vraylar) 3 MG capsule capsule; Take 1 capsule by mouth Daily.  Dispense: 30 capsule; Refill: 2  -     lamoTRIgine (LaMICtal) 100 MG tablet; Take 2 tablets by mouth Daily.  Dispense: 60 tablet; Refill: 2    2. Class 3 severe obesity due to excess calories without serious comorbidity with body mass index (BMI) of 40.0 to 44.9 in adult  -     metFORMIN (GLUCOPHAGE) 1000 MG tablet; Take 1 tablet by mouth 2 (Two) Times a Day With Meals.  Dispense: 60 tablet; Refill: 0      Patient is seen today following up on mood, she has been taking Lamictal 200 mg nightly as well as Vraylar 3 mg daily.  She reports she is doing very well on this current regimen as her symptoms are well controlled, would like to continue same.  Patient denies HI/SI.  Follow-up in 3 months for mood check.    Patient is also inquiring about weight loss  therapy, reports she has trialed diet and exercise at home with little improvement.  Weight has steadily increased over the last several months.  Patient has Medicare, we discussed metformin to start, she is agreeable.  I have also discussed in depth at the bedside exercise planning as well as healthy food options, I have also provided information in her discharge packet regarding food options.  Follow-up in 1 month for weight loss check.  She does currently vape nicotine which we have discussed she will need to stop, she is not ready to quit.    Plan:  1.  Continue Lamictal 200 mg nightly  2.  Continue Vraylar 3 mg daily  3.  Start metformin 1000 mg twice daily  4.  Diet and exercise changes  5.  Follow-up in 1 month for weight loss recheck, 3 months for mood check        Follow Up   Return in about 1 month (around 5/17/2023).  Patient was given instructions and counseling regarding her condition or for health maintenance advice. Please see specific information pulled into the AVS if appropriate.

## 2023-04-25 ENCOUNTER — TELEPHONE (OUTPATIENT)
Dept: FAMILY MEDICINE CLINIC | Facility: CLINIC | Age: 33
End: 2023-04-25
Payer: MEDICARE

## 2023-04-25 NOTE — TELEPHONE ENCOUNTER
Caller: Elisa Barrett    Relationship: Self    Best call back number: [phone]    What is the best time to reach you: ANYTIME    Who are you requesting to speak with (clinical staff, provider,  specific staff member): CLINICAL      What was the call regarding: PATIENT HAS SOME QUESTIONS ABOUT THE NEW MEDICATION THAT CHASE PUT HER ON BACK FROM 04/17/23 VISIT    Do you require a callback: YES

## 2023-05-17 ENCOUNTER — OFFICE VISIT (OUTPATIENT)
Dept: FAMILY MEDICINE CLINIC | Facility: CLINIC | Age: 33
End: 2023-05-17
Payer: MEDICARE

## 2023-05-17 VITALS
SYSTOLIC BLOOD PRESSURE: 151 MMHG | HEIGHT: 65 IN | WEIGHT: 250 LBS | BODY MASS INDEX: 41.65 KG/M2 | DIASTOLIC BLOOD PRESSURE: 91 MMHG | RESPIRATION RATE: 20 BRPM | HEART RATE: 65 BPM

## 2023-05-17 DIAGNOSIS — F39 MOOD DISORDER: ICD-10-CM

## 2023-05-17 DIAGNOSIS — E66.01 CLASS 3 SEVERE OBESITY DUE TO EXCESS CALORIES WITHOUT SERIOUS COMORBIDITY WITH BODY MASS INDEX (BMI) OF 40.0 TO 44.9 IN ADULT: ICD-10-CM

## 2023-05-17 PROCEDURE — 1159F MED LIST DOCD IN RCRD: CPT | Performed by: NURSE PRACTITIONER

## 2023-05-17 PROCEDURE — 99213 OFFICE O/P EST LOW 20 MIN: CPT | Performed by: NURSE PRACTITIONER

## 2023-05-17 PROCEDURE — 1160F RVW MEDS BY RX/DR IN RCRD: CPT | Performed by: NURSE PRACTITIONER

## 2023-05-17 RX ORDER — LAMOTRIGINE 100 MG/1
200 TABLET ORAL DAILY
Qty: 60 TABLET | Refills: 2 | Status: SHIPPED | OUTPATIENT
Start: 2023-05-17

## 2023-05-17 NOTE — PROGRESS NOTES
"Chief Complaint  mood disorder  and obesity     Subjective    History of Present Illness      Patient presents to [facility] for   History of Present Illness  Patient has lost 7 pounds in 1 month. Doing well on mood medication. Mood stable. No c/o      Review of Systems    I have reviewed and agree with the HPI and ROS information as above.  Jaleesa Ojeda Gisela, ANNETTE     Objective   Vital Signs:   /91   Pulse 65   Resp 20   Ht 165.1 cm (65\")   Wt 113 kg (250 lb)   BMI 41.60 kg/m²     Class 3 Severe Obesity (BMI >=40). Obesity-related health conditions include the following: obstructive sleep apnea, hypertension, coronary heart disease, diabetes mellitus, dyslipidemias, and GERD. Obesity is improving with treatment. BMI is is above average; BMI management plan is completed. We discussed low calorie, low carb based diet program, portion control, increasing exercise, and pharmacologic options including metformin .      Physical Exam  Constitutional:       Appearance: Normal appearance. She is well-developed. She is obese.   HENT:      Head: Normocephalic and atraumatic.      Right Ear: External ear normal.      Left Ear: External ear normal.      Nose: Nose normal. No nasal tenderness or congestion.      Mouth/Throat:      Lips: Pink. No lesions.      Mouth: Mucous membranes are moist. No oral lesions.      Dentition: Normal dentition.      Pharynx: Oropharynx is clear. No pharyngeal swelling, oropharyngeal exudate or posterior oropharyngeal erythema.   Eyes:      General: Lids are normal. Vision grossly intact. No scleral icterus.        Right eye: No discharge.         Left eye: No discharge.      Extraocular Movements: Extraocular movements intact.      Conjunctiva/sclera: Conjunctivae normal.      Right eye: Right conjunctiva is not injected.      Left eye: Left conjunctiva is not injected.      Pupils: Pupils are equal, round, and reactive to light. "   Cardiovascular:      Rate and Rhythm: Normal rate and regular rhythm.      Heart sounds: Normal heart sounds. No murmur heard.    No gallop.   Pulmonary:      Effort: Pulmonary effort is normal.      Breath sounds: Normal breath sounds and air entry. No wheezing, rhonchi or rales.   Musculoskeletal:         General: No tenderness or deformity. Normal range of motion.      Cervical back: Full passive range of motion without pain, normal range of motion and neck supple.      Right lower leg: No edema.      Left lower leg: No edema.   Skin:     General: Skin is warm and dry.      Coloration: Skin is not jaundiced.      Findings: No rash.   Neurological:      Mental Status: She is alert and oriented to person, place, and time.      Sensory: Sensation is intact.      Motor: Motor function is intact.      Coordination: Coordination is intact.      Gait: Gait is intact.   Psychiatric:         Attention and Perception: Attention normal.         Mood and Affect: Mood and affect normal.         Behavior: Behavior is not hyperactive. Behavior is cooperative.         Thought Content: Thought content normal.         Judgment: Judgment normal.        SANIA-7:      PHQ-2 Depression Screening  Little interest or pleasure in doing things? 0-->not at all   Feeling down, depressed, or hopeless? 0-->not at all   PHQ-2 Total Score 0     PHQ-9 Depression Screening  Little interest or pleasure in doing things? 0-->not at all   Feeling down, depressed, or hopeless? 0-->not at all   Trouble falling or staying asleep, or sleeping too much?     Feeling tired or having little energy?     Poor appetite or overeating?     Feeling bad about yourself - or that you are a failure or have let yourself or your family down?     Trouble concentrating on things, such as reading the newspaper or watching television?     Moving or speaking so slowly that other people could have noticed? Or the opposite - being so fidgety or restless that you have been  moving around a lot more than usual?     Thoughts that you would be better off dead, or of hurting yourself in some way?     PHQ-9 Total Score 0   If you checked off any problems, how difficult have these problems made it for you to do your work, take care of things at home, or get along with other people?        Result Review  Data Reviewed:                   Assessment and Plan      Diagnoses and all orders for this visit:    1. Mood disorder  -     Cariprazine HCl (Vraylar) 3 MG capsule capsule; Take 1 capsule by mouth Daily.  Dispense: 30 capsule; Refill: 2  -     lamoTRIgine (LaMICtal) 100 MG tablet; Take 2 tablets by mouth Daily.  Dispense: 60 tablet; Refill: 2    2. Class 3 severe obesity due to excess calories without serious comorbidity with body mass index (BMI) of 40.0 to 44.9 in adult  -     metFORMIN (GLUCOPHAGE) 1000 MG tablet; Take 1 tablet by mouth 2 (Two) Times a Day With Meals.  Dispense: 60 tablet; Refill: 2    Comes in today to follow-up on mood disorder and metformin.  Patient has lost 7 pounds over the last month.  She has made dietary changes as well.  Plan at this time will be to continue the same.  Patient is due for Medicare wellness if she is going to set that up today as well.        Follow Up   Return in about 3 months (around 8/17/2023) for Medicare Wellness.  Patient was given instructions and counseling regarding her condition or for health maintenance advice. Please see specific information pulled into the AVS if appropriate.

## 2023-08-12 DIAGNOSIS — E66.01 CLASS 3 SEVERE OBESITY DUE TO EXCESS CALORIES WITHOUT SERIOUS COMORBIDITY WITH BODY MASS INDEX (BMI) OF 40.0 TO 44.9 IN ADULT: ICD-10-CM

## 2023-08-17 ENCOUNTER — TELEPHONE (OUTPATIENT)
Dept: FAMILY MEDICINE CLINIC | Facility: CLINIC | Age: 33
End: 2023-08-17
Payer: MEDICARE

## 2023-08-17 ENCOUNTER — OFFICE VISIT (OUTPATIENT)
Dept: FAMILY MEDICINE CLINIC | Facility: CLINIC | Age: 33
End: 2023-08-17
Payer: MEDICARE

## 2023-08-17 ENCOUNTER — LAB (OUTPATIENT)
Dept: LAB | Facility: HOSPITAL | Age: 33
End: 2023-08-17
Payer: MEDICARE

## 2023-08-17 VITALS
HEIGHT: 65 IN | BODY MASS INDEX: 42.32 KG/M2 | HEART RATE: 72 BPM | SYSTOLIC BLOOD PRESSURE: 126 MMHG | OXYGEN SATURATION: 99 % | WEIGHT: 254 LBS | TEMPERATURE: 98.4 F | DIASTOLIC BLOOD PRESSURE: 85 MMHG

## 2023-08-17 DIAGNOSIS — Z00.00 MEDICARE ANNUAL WELLNESS VISIT, SUBSEQUENT: ICD-10-CM

## 2023-08-17 DIAGNOSIS — Z11.59 ENCOUNTER FOR HEPATITIS C SCREENING TEST FOR LOW RISK PATIENT: ICD-10-CM

## 2023-08-17 DIAGNOSIS — F39 MOOD DISORDER: ICD-10-CM

## 2023-08-17 DIAGNOSIS — E66.01 MORBID OBESITY: ICD-10-CM

## 2023-08-17 DIAGNOSIS — Z00.00 MEDICARE ANNUAL WELLNESS VISIT, SUBSEQUENT: Primary | ICD-10-CM

## 2023-08-17 LAB
ALBUMIN SERPL-MCNC: 4.3 G/DL (ref 3.5–5)
ALBUMIN/GLOB SERPL: 1.2 G/DL (ref 1.1–2.5)
ALP SERPL-CCNC: 74 U/L (ref 24–120)
ALT SERPL W P-5'-P-CCNC: 32 U/L (ref 0–35)
ANION GAP SERPL CALCULATED.3IONS-SCNC: 9 MMOL/L (ref 4–13)
AST SERPL-CCNC: 24 U/L (ref 7–45)
AUTO MIXED CELLS #: 0.5 10*3/MM3 (ref 0.1–2.6)
AUTO MIXED CELLS %: 9 % (ref 0.1–24)
BILIRUB SERPL-MCNC: 0.4 MG/DL (ref 0.1–1)
BILIRUB UR QL STRIP: NEGATIVE
BUN SERPL-MCNC: 8 MG/DL (ref 5–21)
BUN/CREAT SERPL: 9.4
CALCIUM SPEC-SCNC: 9.1 MG/DL (ref 8.4–10.4)
CHLORIDE SERPL-SCNC: 103 MMOL/L (ref 98–110)
CHOLEST SERPL-MCNC: 160 MG/DL (ref 130–200)
CLARITY UR: CLEAR
CO2 SERPL-SCNC: 27 MMOL/L (ref 24–31)
COLOR UR: YELLOW
CREAT SERPL-MCNC: 0.85 MG/DL (ref 0.5–1.4)
EGFRCR SERPLBLD CKD-EPI 2021: 94.1 ML/MIN/1.73
ERYTHROCYTE [DISTWIDTH] IN BLOOD BY AUTOMATED COUNT: 12.4 % (ref 12.3–15.4)
GLOBULIN UR ELPH-MCNC: 3.6 GM/DL
GLUCOSE SERPL-MCNC: 110 MG/DL (ref 70–100)
GLUCOSE UR STRIP-MCNC: NEGATIVE MG/DL
HCT VFR BLD AUTO: 39.1 % (ref 34–46.6)
HCV AB SER DONR QL: NORMAL
HDLC SERPL-MCNC: 59 MG/DL
HGB BLD-MCNC: 13.1 G/DL (ref 12–15.9)
HGB UR QL STRIP.AUTO: NEGATIVE
KETONES UR QL STRIP: NEGATIVE
LDLC SERPL CALC-MCNC: 85 MG/DL (ref 0–99)
LDLC/HDLC SERPL: 1.43 {RATIO}
LEUKOCYTE ESTERASE UR QL STRIP.AUTO: NEGATIVE
LYMPHOCYTES # BLD AUTO: 2.7 10*3/MM3 (ref 0.7–3.1)
LYMPHOCYTES NFR BLD AUTO: 47.2 % (ref 19.6–45.3)
MCH RBC QN AUTO: 29.7 PG (ref 26.6–33)
MCHC RBC AUTO-ENTMCNC: 33.5 G/DL (ref 31.5–35.7)
MCV RBC AUTO: 88.7 FL (ref 79–97)
NEUTROPHILS NFR BLD AUTO: 2.5 10*3/MM3 (ref 1.7–7)
NEUTROPHILS NFR BLD AUTO: 43.8 % (ref 42.7–76)
NITRITE UR QL STRIP: NEGATIVE
PH UR STRIP.AUTO: 6 [PH] (ref 5–8)
PLATELET # BLD AUTO: 299 10*3/MM3 (ref 140–450)
PMV BLD AUTO: 9 FL (ref 6–12)
POTASSIUM SERPL-SCNC: 3.9 MMOL/L (ref 3.5–5.3)
PROT SERPL-MCNC: 7.9 G/DL (ref 6.3–8.7)
PROT UR QL STRIP: NEGATIVE
RBC # BLD AUTO: 4.41 10*6/MM3 (ref 3.77–5.28)
SODIUM SERPL-SCNC: 139 MMOL/L (ref 135–145)
SP GR UR STRIP: 1.02 (ref 1–1.03)
TRIGL SERPL-MCNC: 84 MG/DL (ref 0–149)
UROBILINOGEN UR QL STRIP: NORMAL
VLDLC SERPL-MCNC: 16 MG/DL (ref 5–40)
WBC NRBC COR # BLD: 5.7 10*3/MM3 (ref 3.4–10.8)

## 2023-08-17 PROCEDURE — 85025 COMPLETE CBC W/AUTO DIFF WBC: CPT | Performed by: PEDIATRICS

## 2023-08-17 PROCEDURE — 80053 COMPREHEN METABOLIC PANEL: CPT

## 2023-08-17 PROCEDURE — 80061 LIPID PANEL: CPT

## 2023-08-17 PROCEDURE — 86803 HEPATITIS C AB TEST: CPT

## 2023-08-17 PROCEDURE — 36415 COLL VENOUS BLD VENIPUNCTURE: CPT

## 2023-08-17 PROCEDURE — 81003 URINALYSIS AUTO W/O SCOPE: CPT

## 2023-08-17 RX ORDER — PHENTERMINE HYDROCHLORIDE 37.5 MG/1
37.5 CAPSULE ORAL EVERY MORNING
Qty: 30 CAPSULE | Refills: 0 | Status: SHIPPED | OUTPATIENT
Start: 2023-08-17

## 2023-08-17 NOTE — ASSESSMENT & PLAN NOTE
Patient's (Body mass index is 42.27 kg/mý.) indicates that they are morbidly/severely obese (BMI > 40 or > 35 with obesity - related health condition) with health conditions that include none . Weight is unchanged. BMI  is above average; BMI management plan is completed. We discussed portion control and increasing exercise.     We will do a trial of Phentermine and see her back in 1 month.

## 2023-08-17 NOTE — PROGRESS NOTES
The ABCs of the Annual Wellness Visit  Subsequent Medicare Wellness Visit    Chief Complaint   Patient presents with    Medicare Wellness-subsequent       Subjective   History of Present Illness:      Elisa Barrett is a 33 y.o. female who presents for a Subsequent Medicare Wellness Visit.    HEALTH RISK ASSESSMENT    Recent Hospitalizations:  No hospitalization(s) within the last year.    Current Medical Providers:  Patient Care Team:  Tyron Wayne Jr., MD as PCP - General (Family Medicine)    Smoking Status:  Social History     Tobacco Use   Smoking Status Never   Smokeless Tobacco Never   Tobacco Comments    vape        Alcohol Consumption:  Social History     Substance and Sexual Activity   Alcohol Use No       Depression Screen:       8/17/2023     9:52 AM   PHQ-2/PHQ-9 Depression Screening   Little Interest or Pleasure in Doing Things 2-->more than half the days   Feeling Down, Depressed or Hopeless 3-->nearly every day   Trouble Falling or Staying Asleep, or Sleeping Too Much 0-->not at all   Feeling Tired or Having Little Energy 0-->not at all   Poor Appetite or Overeating 3-->nearly every day   Feeling Bad about Yourself - or that You are a Failure or Have Let Yourself or Your Family Down 0-->not at all   Trouble Concentrating on Things, Such as Reading the Newspaper or Watching Television 3-->nearly every day   Moving or Speaking So Slowly that Other People Could Have Noticed? Or the Opposite - Being So Fidgety 0-->not at all   Thoughts that You Would be Better Off Dead or of Hurting Yourself in Some Way 0-->not at all   PHQ-9: Brief Depression Severity Measure Score 11   If You Checked Off Any Problems, How Difficult Have These Problems Made It For You to Do Your Work, Take Care of Things at Home, or Get Along with Other People? somewhat difficult       PHQ-2 Depression Screening  Little interest or pleasure in doing things? 2-->more than half the days   Feeling down, depressed, or hopeless?  3-->nearly every day   PHQ-2 Total Score 11      PHQ-9 Depression Screening  Little interest or pleasure in doing things? 2-->more than half the days   Feeling down, depressed, or hopeless? 3-->nearly every day   Trouble falling or staying asleep, or sleeping too much? 0-->not at all   Feeling tired or having little energy? 0-->not at all   Poor appetite or overeating? 3-->nearly every day   Feeling bad about yourself - or that you are a failure or have let yourself or your family down? 0-->not at all   Trouble concentrating on things, such as reading the newspaper or watching television? 3-->nearly every day   Moving or speaking so slowly that other people could have noticed? Or the opposite - being so fidgety or restless that you have been moving around a lot more than usual? 0-->not at all   Thoughts that you would be better off dead, or of hurting yourself in some way? 0-->not at all   PHQ-9 Total Score 11   If you checked off any problems, how difficult have these problems made it for you to do your work, take care of things at home, or get along with other people? somewhat difficult        Fall Risk Screen:  NAS Fall Risk Assessment was completed, and patient is at LOW risk for falls.Assessment completed on:2023    Health Habits and Functional and Cognitive Screenin/17/2023     9:51 AM   Functional & Cognitive Status   Do you have difficulty preparing food and eating? No   Do you have difficulty bathing yourself, getting dressed or grooming yourself? No   Do you have difficulty using the toilet? No   Do you have difficulty moving around from place to place? No   Do you have trouble with steps or getting out of a bed or a chair? No   Current Diet Unhealthy Diet   Dental Exam Not up to date   Eye Exam Up to date   Exercise (times per week) 0 times per week   Current Exercises Include No Regular Exercise   Do you need help using the phone?  No   Are you deaf or do you have serious difficulty  hearing?  No   Do you need help to go to places out of walking distance? No   Do you need help shopping? No   Do you need help preparing meals?  No   Do you need help with housework?  No   Do you need help with laundry? No   Do you need help taking your medications? No   Do you need help managing money? No   Do you ever drive or ride in a car without wearing a seat belt? No   Have you felt unusual stress, anger or loneliness in the last month? No   Who do you live with? Other   If you need help, do you have trouble finding someone available to you? No   Have you been bothered in the last four weeks by sexual problems? No   Do you have difficulty concentrating, remembering or making decisions? Yes              Does the patient have evidence of cognitive impairment? No    Asprin use counseling:Does not need ASA (and currently is not on it)    Age-appropriate Screening Schedule:  Refer to the list below for future screening recommendations based on patient's age, sex and/or medical conditions. Orders for these recommended tests are listed in the plan section. The patient has been provided with a written plan.    Health Maintenance   Topic Date Due    COVID-19 Vaccine (1) Never done    PAP SMEAR  Never done    TDAP/TD VACCINES (3 - Td or Tdap) 09/12/2023    INFLUENZA VACCINE  10/01/2023    ANNUAL WELLNESS VISIT  08/17/2024    HEPATITIS C SCREENING  Completed    Pneumococcal Vaccine 0-64  Aged Out          The following portions of the patient's history were reviewed and updated as appropriate: allergies, current medications, past family history, past medical history, past social history, past surgical history, and problem list.    Outpatient Medications Prior to Visit   Medication Sig Dispense Refill    lamoTRIgine (LaMICtal) 100 MG tablet Take 2 tablets by mouth Daily. 60 tablet 2    Larissia 0.1-20 MG-MCG per tablet Take 1 tablet by mouth Daily.      Cariprazine HCl (Vraylar) 3 MG capsule capsule Take 1 capsule by  "mouth Daily. 30 capsule 2    metFORMIN (GLUCOPHAGE) 1000 MG tablet Take 1 tablet by mouth 2 (Two) Times a Day With Meals. (Patient not taking: Reported on 8/17/2023) 60 tablet 2     No facility-administered medications prior to visit.       Patient Active Problem List   Diagnosis    Mood disorder    Attention deficit hyperactivity disorder (ADHD), combined type    Class 2 obesity due to excess calories without serious comorbidity with body mass index (BMI) of 39.0 to 39.9 in adult    Medicare annual wellness visit, subsequent    Morbid obesity    Encounter for hepatitis C screening test for low risk patient       Advanced Care Planning:  ACP discussion was held with the patient during this visit. Patient does not have an advance directive, information provided.    Review of Systems    Compared to one year ago, the patient feels her physical health is better.  Compared to one year ago, the patient feels her mental health is the same.    Reviewed chart for potential of high risk medication in the elderly: yes  Reviewed chart for potential of harmful drug interactions in the elderly:yes    Objective         Vitals:    08/17/23 0953   BP: 126/85   Pulse: 72   Temp: 98.4 øF (36.9 øC)   SpO2: 99%   Weight: 115 kg (254 lb)   Height: 165.1 cm (65\")       Body mass index is 42.27 kg/mý.  Discussed the patient's BMI with her. The BMI is below average; BMI management plan is completed.    Physical Exam  Vitals and nursing note reviewed.   Constitutional:       Appearance: Normal appearance. She is well-developed. She is morbidly obese.   HENT:      Head: Normocephalic and atraumatic.      Right Ear: Tympanic membrane, ear canal and external ear normal.      Left Ear: Tympanic membrane, ear canal and external ear normal.      Nose: Nose normal. No septal deviation, nasal tenderness or congestion.      Mouth/Throat:      Lips: Pink. No lesions.      Mouth: Mucous membranes are moist. No oral lesions.      Dentition: Normal " dentition.      Pharynx: Oropharynx is clear. No pharyngeal swelling, oropharyngeal exudate or posterior oropharyngeal erythema.   Eyes:      General: Lids are normal. Vision grossly intact. No scleral icterus.        Right eye: No discharge.         Left eye: No discharge.      Extraocular Movements: Extraocular movements intact.      Conjunctiva/sclera: Conjunctivae normal.      Right eye: Right conjunctiva is not injected.      Left eye: Left conjunctiva is not injected.      Pupils: Pupils are equal, round, and reactive to light.   Neck:      Thyroid: No thyroid mass.      Trachea: Trachea normal.   Cardiovascular:      Rate and Rhythm: Normal rate and regular rhythm.      Heart sounds: Normal heart sounds. No murmur heard.    No gallop.   Pulmonary:      Effort: Pulmonary effort is normal.      Breath sounds: Normal breath sounds and air entry. No wheezing, rhonchi or rales.   Abdominal:      General: There is no distension.      Palpations: Abdomen is soft. There is no mass.      Tenderness: There is no abdominal tenderness. There is no right CVA tenderness, left CVA tenderness, guarding or rebound.   Musculoskeletal:         General: No tenderness or deformity. Normal range of motion.      Cervical back: Full passive range of motion without pain, normal range of motion and neck supple.      Thoracic back: Normal.      Right lower leg: No edema.      Left lower leg: No edema.   Skin:     General: Skin is warm and dry.      Coloration: Skin is not jaundiced.      Findings: No rash.   Neurological:      Mental Status: She is alert and oriented to person, place, and time.      Sensory: Sensation is intact.      Motor: Motor function is intact.      Coordination: Coordination is intact.      Gait: Gait is intact.      Deep Tendon Reflexes: Reflexes are normal and symmetric.   Psychiatric:         Mood and Affect: Mood and affect normal.         Judgment: Judgment normal.       Lab Results   Component Value Date     TRIG 84 08/17/2023    HDL 59 08/17/2023    LDL 85 08/17/2023    VLDL 16 08/17/2023        Assessment & Plan   Medicare Risks and Personalized Health Plan  CMS Preventative Services Quick Reference  Advance Directive Discussion  Fall Risk  Immunizations Discussed/Encouraged (specific immunizations; COVID19 )    The above risks/problems have been discussed with the patient.  Pertinent information has been shared with the patient in the After Visit Summary.  Follow up plans and orders are seen below in the Assessment/Plan Section.    Problem List Items Addressed This Visit          Endocrine and Metabolic    Morbid obesity    Current Assessment & Plan     Patient's (Body mass index is 42.27 kg/mý.) indicates that they are morbidly/severely obese (BMI > 40 or > 35 with obesity - related health condition) with health conditions that include none . Weight is unchanged. BMI  is above average; BMI management plan is completed. We discussed portion control and increasing exercise.     We will do a trial of Phentermine and see her back in 1 month.         Relevant Medications    phentermine 37.5 MG capsule       Health Encounters    Medicare annual wellness visit, subsequent - Primary    Current Assessment & Plan     Discussed safety and nutrition.         Relevant Orders    CBC & Differential (Completed)    Comprehensive metabolic panel (Completed)    Lipid panel (Completed)    Urinalysis With Culture If Indicated - Urine, Clean Catch (Completed)       Infectious Diseases    Encounter for hepatitis C screening test for low risk patient    Relevant Orders    Hepatitis C antibody (Completed)       Mental Health    Mood disorder    Current Assessment & Plan     Doing well on Lamictal and Vraylar.  We will continue same.           Relevant Medications    Cariprazine HCl (Vraylar) 3 MG capsule capsule    phentermine 37.5 MG capsule        Follow Up:  Return in about 1 year (around 8/17/2024) for Medicare Wellness.     An After  Visit Summary and PPPS were given to the patient.

## 2023-09-15 DIAGNOSIS — E66.01 CLASS 3 SEVERE OBESITY DUE TO EXCESS CALORIES WITHOUT SERIOUS COMORBIDITY WITH BODY MASS INDEX (BMI) OF 40.0 TO 44.9 IN ADULT: ICD-10-CM

## 2023-09-18 ENCOUNTER — OFFICE VISIT (OUTPATIENT)
Dept: FAMILY MEDICINE CLINIC | Facility: CLINIC | Age: 33
End: 2023-09-18
Payer: MEDICARE

## 2023-09-18 VITALS
HEIGHT: 65 IN | RESPIRATION RATE: 20 BRPM | HEART RATE: 73 BPM | DIASTOLIC BLOOD PRESSURE: 85 MMHG | BODY MASS INDEX: 41.42 KG/M2 | WEIGHT: 248.6 LBS | SYSTOLIC BLOOD PRESSURE: 125 MMHG

## 2023-09-18 DIAGNOSIS — E66.01 CLASS 3 SEVERE OBESITY DUE TO EXCESS CALORIES WITHOUT SERIOUS COMORBIDITY WITH BODY MASS INDEX (BMI) OF 40.0 TO 44.9 IN ADULT: Primary | ICD-10-CM

## 2023-09-18 PROBLEM — E66.09 CLASS 2 OBESITY DUE TO EXCESS CALORIES WITHOUT SERIOUS COMORBIDITY WITH BODY MASS INDEX (BMI) OF 39.0 TO 39.9 IN ADULT: Status: RESOLVED | Noted: 2022-08-24 | Resolved: 2023-09-18

## 2023-09-18 PROBLEM — E66.812 CLASS 2 OBESITY DUE TO EXCESS CALORIES WITHOUT SERIOUS COMORBIDITY WITH BODY MASS INDEX (BMI) OF 39.0 TO 39.9 IN ADULT: Status: RESOLVED | Noted: 2022-08-24 | Resolved: 2023-09-18

## 2023-09-18 PROCEDURE — 99213 OFFICE O/P EST LOW 20 MIN: CPT

## 2023-09-18 PROCEDURE — 1160F RVW MEDS BY RX/DR IN RCRD: CPT

## 2023-09-18 PROCEDURE — 1159F MED LIST DOCD IN RCRD: CPT

## 2023-09-18 NOTE — PROGRESS NOTES
"Chief Complaint  Obesity    Subjective    History of Present Illness      Patient presents to [facility] for   History of Present Illness  Pt here for 1 month phentermine f/u Pt last seen 8/17/23. Pt has no complaints or concerns regarding phentermine at this time.      Review of Systems   All other systems reviewed and are negative.    I have reviewed and agree with the HPI and ROS information as above.  Kristina Yuen, APRN     Objective   Vital Signs:   /85   Pulse 73   Resp 20   Ht 165.1 cm (65\")   Wt 113 kg (248 lb 9.6 oz)   BMI 41.37 kg/m²            Physical Exam  Constitutional:       Appearance: Normal appearance. She is well-developed. She is obese.   HENT:      Head: Normocephalic and atraumatic.      Right Ear: Tympanic membrane, ear canal and external ear normal.      Left Ear: Tympanic membrane, ear canal and external ear normal.      Nose: Nose normal. No septal deviation, nasal tenderness or congestion.      Mouth/Throat:      Lips: Pink. No lesions.      Mouth: Mucous membranes are moist. No oral lesions.      Dentition: Normal dentition.      Pharynx: Oropharynx is clear. No pharyngeal swelling, oropharyngeal exudate or posterior oropharyngeal erythema.   Eyes:      General: Lids are normal. Vision grossly intact. No scleral icterus.        Right eye: No discharge.         Left eye: No discharge.      Extraocular Movements: Extraocular movements intact.      Conjunctiva/sclera: Conjunctivae normal.      Right eye: Right conjunctiva is not injected.      Left eye: Left conjunctiva is not injected.      Pupils: Pupils are equal, round, and reactive to light.   Neck:      Thyroid: No thyroid mass.      Trachea: Trachea normal.   Cardiovascular:      Rate and Rhythm: Normal rate and regular rhythm.      Heart sounds: Normal heart sounds. No murmur heard.    No gallop.   Pulmonary:      Effort: Pulmonary effort is normal.      Breath sounds: Normal breath sounds " and air entry. No wheezing, rhonchi or rales.   Abdominal:      General: There is no distension.      Palpations: Abdomen is soft. There is no mass.      Tenderness: There is no abdominal tenderness. There is no right CVA tenderness, left CVA tenderness, guarding or rebound.   Musculoskeletal:         General: No tenderness or deformity. Normal range of motion.      Cervical back: Full passive range of motion without pain, normal range of motion and neck supple.      Thoracic back: Normal.      Right lower leg: No edema.      Left lower leg: No edema.   Skin:     General: Skin is warm and dry.      Coloration: Skin is not jaundiced.      Findings: No rash.   Neurological:      Mental Status: She is alert and oriented to person, place, and time.      Sensory: Sensation is intact.      Motor: Motor function is intact.      Coordination: Coordination is intact.      Gait: Gait is intact.      Deep Tendon Reflexes: Reflexes are normal and symmetric.   Psychiatric:         Mood and Affect: Mood and affect normal.         Judgment: Judgment normal.        SANIA-7:      PHQ-2 Depression Screening  Little interest or pleasure in doing things?     Feeling down, depressed, or hopeless?     PHQ-2 Total Score       PHQ-9 Depression Screening  Little interest or pleasure in doing things?     Feeling down, depressed, or hopeless?     Trouble falling or staying asleep, or sleeping too much?     Feeling tired or having little energy?     Poor appetite or overeating?     Feeling bad about yourself - or that you are a failure or have let yourself or your family down?     Trouble concentrating on things, such as reading the newspaper or watching television?     Moving or speaking so slowly that other people could have noticed? Or the opposite - being so fidgety or restless that you have been moving around a lot more than usual?     Thoughts that you would be better off dead, or of hurting yourself in some way?     PHQ-9 Total Score      If you checked off any problems, how difficult have these problems made it for you to do your work, take care of things at home, or get along with other people?        Result Review  Data Reviewed:                   Assessment and Plan      Diagnoses and all orders for this visit:    1. Class 3 severe obesity due to excess calories without serious comorbidity with body mass index (BMI) of 40.0 to 44.9 in adult (Primary)      Patient is seen today for obesity. Patient is doing well on current medication regimen. Patient is down 6lbs since starting this medication.  Patient is tolerating well.    Cont Phentermine 37.5mg; this will be her second month.       Follow Up   Return in about 1 month (around 10/18/2023) for Weight Check.  Patient was given instructions and counseling regarding her condition or for health maintenance advice. Please see specific information pulled into the AVS if appropriate.

## 2023-09-19 RX ORDER — PHENTERMINE HYDROCHLORIDE 37.5 MG/1
37.5 CAPSULE ORAL EVERY MORNING
Qty: 30 CAPSULE | Refills: 0 | Status: SHIPPED | OUTPATIENT
Start: 2023-09-19

## 2023-10-01 DIAGNOSIS — E66.01 CLASS 3 SEVERE OBESITY DUE TO EXCESS CALORIES WITHOUT SERIOUS COMORBIDITY WITH BODY MASS INDEX (BMI) OF 40.0 TO 44.9 IN ADULT: ICD-10-CM

## 2023-10-18 ENCOUNTER — OFFICE VISIT (OUTPATIENT)
Dept: FAMILY MEDICINE CLINIC | Facility: CLINIC | Age: 33
End: 2023-10-18
Payer: MEDICARE

## 2023-10-18 VITALS
WEIGHT: 246 LBS | HEIGHT: 65 IN | OXYGEN SATURATION: 98 % | DIASTOLIC BLOOD PRESSURE: 91 MMHG | HEART RATE: 110 BPM | BODY MASS INDEX: 40.98 KG/M2 | SYSTOLIC BLOOD PRESSURE: 131 MMHG | TEMPERATURE: 98.6 F

## 2023-10-18 DIAGNOSIS — E66.01 MORBID OBESITY WITH BMI OF 40.0-44.9, ADULT: Primary | ICD-10-CM

## 2023-10-18 DIAGNOSIS — F39 MOOD DISORDER: ICD-10-CM

## 2023-10-18 DIAGNOSIS — R45.4 IRRITABILITY: ICD-10-CM

## 2023-10-18 PROBLEM — Z00.00 MEDICARE ANNUAL WELLNESS VISIT, SUBSEQUENT: Status: RESOLVED | Noted: 2023-08-17 | Resolved: 2023-10-18

## 2023-10-18 PROCEDURE — 99214 OFFICE O/P EST MOD 30 MIN: CPT | Performed by: NURSE PRACTITIONER

## 2023-10-18 PROCEDURE — 1160F RVW MEDS BY RX/DR IN RCRD: CPT | Performed by: NURSE PRACTITIONER

## 2023-10-18 PROCEDURE — 1159F MED LIST DOCD IN RCRD: CPT | Performed by: NURSE PRACTITIONER

## 2023-10-18 RX ORDER — PHENTERMINE HYDROCHLORIDE 37.5 MG/1
37.5 CAPSULE ORAL EVERY MORNING
Qty: 30 CAPSULE | Refills: 0 | Status: SHIPPED | OUTPATIENT
Start: 2023-10-18

## 2023-10-18 RX ORDER — LAMOTRIGINE 100 MG/1
200 TABLET ORAL DAILY
Qty: 60 TABLET | Refills: 2 | Status: SHIPPED | OUTPATIENT
Start: 2023-10-18

## 2023-10-18 NOTE — PROGRESS NOTES
"Chief Complaint  Obesity    Subjective    History of Present Illness      Patient presents to [facility] for   History of Present Illness  Weight check on phentermine. Has lot a little more. Also c/o moodiness and mother says she is quick to get irritable, angry and have outbursts.        Review of Systems    I have reviewed and agree with the HPI and ROS information as above.  Akilah Reyes Kobe, APRN     Objective   Vital Signs:   /91   Pulse 110   Temp 98.6 °F (37 °C)   Ht 165.1 cm (65\")   Wt 112 kg (246 lb)   SpO2 98%   BMI 40.94 kg/m²            Physical Exam  Constitutional:       Appearance: She is well-developed. She is morbidly obese.   HENT:      Head: Normocephalic and atraumatic.      Right Ear: Tympanic membrane, ear canal and external ear normal.      Left Ear: Tympanic membrane, ear canal and external ear normal.      Nose: Nose normal. No septal deviation, nasal tenderness or congestion.      Mouth/Throat:      Lips: Pink. No lesions.      Mouth: Mucous membranes are moist. No oral lesions.      Dentition: Normal dentition.      Pharynx: Oropharynx is clear. No pharyngeal swelling, oropharyngeal exudate or posterior oropharyngeal erythema.   Eyes:      General: Lids are normal. Vision grossly intact. No scleral icterus.        Right eye: No discharge.         Left eye: No discharge.      Extraocular Movements: Extraocular movements intact.      Conjunctiva/sclera: Conjunctivae normal.      Right eye: Right conjunctiva is not injected.      Left eye: Left conjunctiva is not injected.      Pupils: Pupils are equal, round, and reactive to light.   Neck:      Thyroid: No thyroid mass.      Trachea: Trachea normal.   Cardiovascular:      Rate and Rhythm: Normal rate and regular rhythm.      Heart sounds: Normal heart sounds. No murmur heard.     No gallop.   Pulmonary:      Effort: Pulmonary effort is normal.      Breath sounds: Normal breath sounds and air entry. No " wheezing, rhonchi or rales.   Abdominal:      General: There is no distension.      Palpations: Abdomen is soft. There is no mass.      Tenderness: There is no abdominal tenderness. There is no right CVA tenderness, left CVA tenderness, guarding or rebound.   Musculoskeletal:         General: No tenderness or deformity. Normal range of motion.      Cervical back: Full passive range of motion without pain, normal range of motion and neck supple.      Thoracic back: Normal.      Right lower leg: No edema.      Left lower leg: No edema.   Skin:     General: Skin is warm and dry.      Coloration: Skin is not jaundiced.      Findings: No rash.   Neurological:      Mental Status: She is alert and oriented to person, place, and time.      Sensory: Sensation is intact.      Motor: Motor function is intact.      Coordination: Coordination is intact.      Gait: Gait is intact.      Deep Tendon Reflexes: Reflexes are normal and symmetric.   Psychiatric:         Mood and Affect: Mood and affect normal.         Judgment: Judgment normal.           Result Review  Data Reviewed:                   Assessment and Plan      Diagnoses and all orders for this visit:    1. Morbid obesity with BMI of 40.0-44.9, adult (Primary)  Comments:  Proceed with 3rd month of phentermine. Diet discussed she admits to high carb intake. Recommend nutritional counseling. Consult bariatric nutrition.  Orders:  -     phentermine 37.5 MG capsule; Take 1 capsule by mouth Every Morning.  Dispense: 30 capsule; Refill: 0  -     Bariatric Nutritional Counseling    2. Mood disorder  Comments:  Increase in short fuse, outbursts. Increase Vraylar to 4.5mg and continue lamotrigine.  Orders:  -     Cariprazine HCl (Vraylar) 4.5 MG capsule capsule; Take 1 capsule by mouth Daily.  Dispense: 30 capsule; Refill: 2  -     lamoTRIgine (LaMICtal) 100 MG tablet; Take 2 tablets by mouth Daily.  Dispense: 60 tablet; Refill: 2    3. Irritability  -     Cariprazine HCl  (Vraylar) 4.5 MG capsule capsule; Take 1 capsule by mouth Daily.  Dispense: 30 capsule; Refill: 2  -     lamoTRIgine (LaMICtal) 100 MG tablet; Take 2 tablets by mouth Daily.  Dispense: 60 tablet; Refill: 2            Follow Up   Return in about 3 months (around 1/18/2024).  Patient was given instructions and counseling regarding her condition or for health maintenance advice. Please see specific information pulled into the AVS if appropriate.

## 2024-01-18 ENCOUNTER — OFFICE VISIT (OUTPATIENT)
Dept: FAMILY MEDICINE CLINIC | Facility: CLINIC | Age: 34
End: 2024-01-18
Payer: MEDICARE

## 2024-01-18 VITALS
WEIGHT: 250.8 LBS | SYSTOLIC BLOOD PRESSURE: 118 MMHG | HEART RATE: 66 BPM | OXYGEN SATURATION: 99 % | HEIGHT: 65 IN | DIASTOLIC BLOOD PRESSURE: 81 MMHG | BODY MASS INDEX: 41.79 KG/M2

## 2024-01-18 DIAGNOSIS — R45.4 IRRITABILITY: ICD-10-CM

## 2024-01-18 DIAGNOSIS — F39 MOOD DISORDER: ICD-10-CM

## 2024-01-18 PROCEDURE — 1159F MED LIST DOCD IN RCRD: CPT | Performed by: NURSE PRACTITIONER

## 2024-01-18 PROCEDURE — 99214 OFFICE O/P EST MOD 30 MIN: CPT | Performed by: NURSE PRACTITIONER

## 2024-01-18 PROCEDURE — 1160F RVW MEDS BY RX/DR IN RCRD: CPT | Performed by: NURSE PRACTITIONER

## 2024-01-18 RX ORDER — LAMOTRIGINE 100 MG/1
200 TABLET ORAL DAILY
Qty: 60 TABLET | Refills: 2 | Status: SHIPPED | OUTPATIENT
Start: 2024-01-18

## 2024-01-18 NOTE — PROGRESS NOTES
"Chief Complaint  mood disorder    Subjective    History of Present Illness      Patient presents to [facility] for   History of Present Illness  Pt presents today for 3 month follow up on mood disorder. Pt tolerating well, no problems or concerns at this time.        Review of Systems    I have reviewed and agree with the HPI information as above.  Yesenia Hines, APRN     Objective   Vital Signs:   /81   Pulse 66   Ht 165.1 cm (65\")   Wt 114 kg (250 lb 12.8 oz)   SpO2 99%   BMI 41.74 kg/m²            Physical Exam  Vitals and nursing note reviewed.   Constitutional:       Appearance: Normal appearance. She is well-developed. She is obese.   HENT:      Head: Normocephalic and atraumatic.      Right Ear: Tympanic membrane, ear canal and external ear normal.      Left Ear: Tympanic membrane, ear canal and external ear normal.      Nose: Nose normal. No septal deviation, nasal tenderness or congestion.      Mouth/Throat:      Lips: Pink. No lesions.      Mouth: Mucous membranes are moist. No oral lesions.      Dentition: Normal dentition.      Pharynx: Oropharynx is clear. No pharyngeal swelling, oropharyngeal exudate or posterior oropharyngeal erythema.   Eyes:      General: Lids are normal. Vision grossly intact. No scleral icterus.        Right eye: No discharge.         Left eye: No discharge.      Extraocular Movements: Extraocular movements intact.      Conjunctiva/sclera: Conjunctivae normal.      Right eye: Right conjunctiva is not injected.      Left eye: Left conjunctiva is not injected.      Pupils: Pupils are equal, round, and reactive to light.   Neck:      Thyroid: No thyroid mass.      Trachea: Trachea normal.   Cardiovascular:      Rate and Rhythm: Normal rate and regular rhythm.      Heart sounds: Normal heart sounds. No murmur heard.     No gallop.   Pulmonary:      Effort: Pulmonary effort is normal.      Breath sounds: Normal breath sounds and air " entry. No wheezing, rhonchi or rales.   Musculoskeletal:         General: No tenderness or deformity. Normal range of motion.      Cervical back: Full passive range of motion without pain, normal range of motion and neck supple.      Thoracic back: Normal.      Right lower leg: No edema.      Left lower leg: No edema.   Skin:     General: Skin is warm and dry.      Coloration: Skin is not jaundiced.      Findings: No rash.   Neurological:      Mental Status: She is alert and oriented to person, place, and time.      Sensory: Sensation is intact.      Motor: Motor function is intact.      Coordination: Coordination is intact.      Gait: Gait is intact.      Deep Tendon Reflexes: Reflexes are normal and symmetric.   Psychiatric:         Mood and Affect: Mood and affect normal.         Behavior: Behavior normal.         Judgment: Judgment normal.          SANIA-7:      PHQ-2 Depression Screening  Little interest or pleasure in doing things?     Feeling down, depressed, or hopeless?     PHQ-2 Total Score       PHQ-9 Depression Screening  Little interest or pleasure in doing things?     Feeling down, depressed, or hopeless?     Trouble falling or staying asleep, or sleeping too much?     Feeling tired or having little energy?     Poor appetite or overeating?     Feeling bad about yourself - or that you are a failure or have let yourself or your family down?     Trouble concentrating on things, such as reading the newspaper or watching television?     Moving or speaking so slowly that other people could have noticed? Or the opposite - being so fidgety or restless that you have been moving around a lot more than usual?     Thoughts that you would be better off dead, or of hurting yourself in some way?     PHQ-9 Total Score     If you checked off any problems, how difficult have these problems made it for you to do your work, take care of things at home, or get along with other people?        Result Review  Data Reviewed:                    Assessment and Plan      Diagnoses and all orders for this visit:    1. Mood disorder  -     Cariprazine HCl (Vraylar) 4.5 MG capsule capsule; Take 1 capsule by mouth Daily.  Dispense: 30 capsule; Refill: 2  -     lamoTRIgine (LaMICtal) 100 MG tablet; Take 2 tablets by mouth Daily.  Dispense: 60 tablet; Refill: 2    2. Irritability  -     Cariprazine HCl (Vraylar) 4.5 MG capsule capsule; Take 1 capsule by mouth Daily.  Dispense: 30 capsule; Refill: 2  -     lamoTRIgine (LaMICtal) 100 MG tablet; Take 2 tablets by mouth Daily.  Dispense: 60 tablet; Refill: 2    Doing well. Denies concerns.         Follow Up   Return in about 3 months (around 4/18/2024) for Mood follow up.  Patient was given instructions and counseling regarding her condition or for health maintenance advice. Please see specific information pulled into the AVS if appropriate.

## 2024-04-18 ENCOUNTER — OFFICE VISIT (OUTPATIENT)
Dept: FAMILY MEDICINE CLINIC | Facility: CLINIC | Age: 34
End: 2024-04-18
Payer: MEDICARE

## 2024-04-18 VITALS
BODY MASS INDEX: 43.32 KG/M2 | HEART RATE: 81 BPM | HEIGHT: 65 IN | OXYGEN SATURATION: 97 % | SYSTOLIC BLOOD PRESSURE: 115 MMHG | DIASTOLIC BLOOD PRESSURE: 79 MMHG | WEIGHT: 260 LBS

## 2024-04-18 DIAGNOSIS — F39 MOOD DISORDER: ICD-10-CM

## 2024-04-18 DIAGNOSIS — R45.4 IRRITABILITY: ICD-10-CM

## 2024-04-18 DIAGNOSIS — E66.09 CLASS 2 OBESITY DUE TO EXCESS CALORIES WITHOUT SERIOUS COMORBIDITY WITH BODY MASS INDEX (BMI) OF 39.0 TO 39.9 IN ADULT: Primary | ICD-10-CM

## 2024-04-18 RX ORDER — LAMOTRIGINE 100 MG/1
200 TABLET ORAL DAILY
Qty: 60 TABLET | Refills: 2 | Status: SHIPPED | OUTPATIENT
Start: 2024-04-18

## 2024-04-18 RX ORDER — PHENTERMINE HYDROCHLORIDE 37.5 MG/1
37.5 TABLET ORAL
Qty: 30 TABLET | Refills: 2 | Status: SHIPPED | OUTPATIENT
Start: 2024-04-18

## 2024-04-18 RX ORDER — TIMOLOL MALEATE 5 MG/ML
1 SOLUTION/ DROPS OPHTHALMIC DAILY
COMMUNITY
Start: 2024-03-06

## 2024-04-18 NOTE — PROGRESS NOTES
"Chief Complaint  mood disorder and irritability    Subjective    History of Present Illness      Patient presents to [facility] for   History of Present Illness  Pt present today for 3 month follow up on mood and irritability. Pt tolerating well, no problems or concerns.  Pt wanting to discuss about her weight. She's gained 10 pounds since her last visit and states she has been snacking more, drinking more sodas, states she is upset and wants to cry after finding out the weight gain       Review of Systems    I have reviewed and agree with the HPI information as above.  Yesenia Hines, APRN     Objective   Vital Signs:   /79   Pulse 81   Ht 165.1 cm (65\")   Wt 118 kg (260 lb)   SpO2 97%   BMI 43.27 kg/m²            Physical Exam  Vitals and nursing note reviewed.   Constitutional:       Appearance: Normal appearance. She is well-developed. She is obese.   HENT:      Head: Normocephalic and atraumatic.      Right Ear: Tympanic membrane, ear canal and external ear normal.      Left Ear: Tympanic membrane, ear canal and external ear normal.      Nose: Nose normal. No septal deviation, nasal tenderness or congestion.      Mouth/Throat:      Lips: Pink. No lesions.      Mouth: Mucous membranes are moist. No oral lesions.      Dentition: Normal dentition.      Pharynx: Oropharynx is clear. No pharyngeal swelling, oropharyngeal exudate or posterior oropharyngeal erythema.   Eyes:      General: Lids are normal. Vision grossly intact. No scleral icterus.        Right eye: No discharge.         Left eye: No discharge.      Extraocular Movements: Extraocular movements intact.      Conjunctiva/sclera: Conjunctivae normal.      Right eye: Right conjunctiva is not injected.      Left eye: Left conjunctiva is not injected.      Pupils: Pupils are equal, round, and reactive to light.   Neck:      Thyroid: No thyroid mass.      Trachea: Trachea normal.   Cardiovascular:      Rate and " Rhythm: Normal rate and regular rhythm.      Heart sounds: Normal heart sounds. No murmur heard.     No gallop.   Pulmonary:      Effort: Pulmonary effort is normal.      Breath sounds: Normal breath sounds and air entry. No wheezing, rhonchi or rales.   Musculoskeletal:         General: No tenderness or deformity. Normal range of motion.      Cervical back: Full passive range of motion without pain, normal range of motion and neck supple.      Thoracic back: Normal.      Right lower leg: No edema.      Left lower leg: No edema.   Skin:     General: Skin is warm and dry.      Coloration: Skin is not jaundiced.      Findings: No rash.   Neurological:      Mental Status: She is alert and oriented to person, place, and time.      Sensory: Sensation is intact.      Motor: Motor function is intact.      Coordination: Coordination is intact.      Gait: Gait is intact.      Deep Tendon Reflexes: Reflexes are normal and symmetric.   Psychiatric:         Mood and Affect: Mood and affect normal.         Behavior: Behavior normal.         Judgment: Judgment normal.          SANIA-7:      PHQ-2 Depression Screening  Little interest or pleasure in doing things? 3-->nearly every day   Feeling down, depressed, or hopeless? 0-->not at all   PHQ-2 Total Score 12     PHQ-9 Depression Screening  Little interest or pleasure in doing things? 3-->nearly every day   Feeling down, depressed, or hopeless? 0-->not at all   Trouble falling or staying asleep, or sleeping too much? 3-->nearly every day   Feeling tired or having little energy? 3-->nearly every day   Poor appetite or overeating? 3-->nearly every day   Feeling bad about yourself - or that you are a failure or have let yourself or your family down? 0-->not at all   Trouble concentrating on things, such as reading the newspaper or watching television? 0-->not at all   Moving or speaking so slowly that other people could have noticed? Or the opposite - being so fidgety or restless that  you have been moving around a lot more than usual? 0-->not at all   Thoughts that you would be better off dead, or of hurting yourself in some way? 0-->not at all   PHQ-9 Total Score 12   If you checked off any problems, how difficult have these problems made it for you to do your work, take care of things at home, or get along with other people? somewhat difficult      Result Review  Data Reviewed:                   Assessment and Plan      Diagnoses and all orders for this visit:    1. Class 2 obesity due to excess calories without serious comorbidity with body mass index (BMI) of 39.0 to 39.9 in adult (Primary)  -     phentermine (Adipex-P) 37.5 MG tablet; Take 1 tablet by mouth Every Morning Before Breakfast.  Dispense: 30 tablet; Refill: 2    2. Mood disorder  -     Cariprazine HCl (Vraylar) 4.5 MG capsule capsule; Take 1 capsule by mouth Daily.  Dispense: 30 capsule; Refill: 2  -     lamoTRIgine (LaMICtal) 100 MG tablet; Take 2 tablets by mouth Daily.  Dispense: 60 tablet; Refill: 2    3. Irritability  -     Cariprazine HCl (Vraylar) 4.5 MG capsule capsule; Take 1 capsule by mouth Daily.  Dispense: 30 capsule; Refill: 2  -     lamoTRIgine (LaMICtal) 100 MG tablet; Take 2 tablets by mouth Daily.  Dispense: 60 tablet; Refill: 2    Patient states that she is doing well on her mood medications. However, she is upset that she has gained weight. She is wanting to try phentermine again. Diet and exercise discussed.   Goal is to lose 15lbs in 3 months.           Follow Up   Return in about 3 months (around 7/18/2024) for Mood follow up.  Patient was given instructions and counseling regarding her condition or for health maintenance advice. Please see specific information pulled into the AVS if appropriate.

## 2024-05-18 DIAGNOSIS — E66.01 CLASS 3 SEVERE OBESITY DUE TO EXCESS CALORIES WITHOUT SERIOUS COMORBIDITY WITH BODY MASS INDEX (BMI) OF 40.0 TO 44.9 IN ADULT: ICD-10-CM

## 2024-07-19 ENCOUNTER — OFFICE VISIT (OUTPATIENT)
Dept: FAMILY MEDICINE CLINIC | Facility: CLINIC | Age: 34
End: 2024-07-19
Payer: MEDICARE

## 2024-07-19 VITALS
DIASTOLIC BLOOD PRESSURE: 89 MMHG | BODY MASS INDEX: 41.52 KG/M2 | SYSTOLIC BLOOD PRESSURE: 126 MMHG | HEIGHT: 65 IN | OXYGEN SATURATION: 98 % | HEART RATE: 92 BPM | WEIGHT: 249.2 LBS

## 2024-07-19 DIAGNOSIS — E66.09 CLASS 2 OBESITY DUE TO EXCESS CALORIES WITHOUT SERIOUS COMORBIDITY WITH BODY MASS INDEX (BMI) OF 39.0 TO 39.9 IN ADULT: ICD-10-CM

## 2024-07-19 DIAGNOSIS — F39 MOOD DISORDER: ICD-10-CM

## 2024-07-19 DIAGNOSIS — R45.4 IRRITABILITY: ICD-10-CM

## 2024-07-19 RX ORDER — PHENTERMINE HYDROCHLORIDE 37.5 MG/1
37.5 TABLET ORAL
Start: 2024-07-19

## 2024-07-19 RX ORDER — LAMOTRIGINE 100 MG/1
200 TABLET ORAL DAILY
Start: 2024-07-19

## 2024-07-19 NOTE — PROGRESS NOTES
"Chief Complaint  Weight loss and Mood disorder    Subjective    History of Present Illness      Patient presents to [facility] for   History of Present Illness  Patient is being seen today for a medication follow-up for mood and for weight loss.  She has been taking phentermine now for 3 months.  She has been very successful in her weight loss.  She has lost a total of 25 pounds.  Other medications are working well and she is needing a refill.     Objective   Vital Signs:   /89   Pulse 92   Ht 165.1 cm (65\")   Wt 113 kg (249 lb 3.2 oz)   SpO2 98%   BMI 41.47 kg/m²            Physical Exam  Vitals and nursing note reviewed.   Constitutional:       Appearance: Normal appearance. She is well-developed. She is obese.   HENT:      Head: Normocephalic and atraumatic.      Right Ear: Tympanic membrane, ear canal and external ear normal.      Left Ear: Tympanic membrane, ear canal and external ear normal.      Nose: Nose normal. No septal deviation, nasal tenderness or congestion.      Mouth/Throat:      Lips: Pink. No lesions.      Mouth: Mucous membranes are moist. No oral lesions.      Dentition: Normal dentition.      Pharynx: Oropharynx is clear. No pharyngeal swelling, oropharyngeal exudate or posterior oropharyngeal erythema.   Eyes:      General: Lids are normal. Vision grossly intact. No scleral icterus.        Right eye: No discharge.         Left eye: No discharge.      Extraocular Movements: Extraocular movements intact.      Conjunctiva/sclera: Conjunctivae normal.      Right eye: Right conjunctiva is not injected.      Left eye: Left conjunctiva is not injected.      Pupils: Pupils are equal, round, and reactive to light.   Neck:      Thyroid: No thyroid mass.      Trachea: Trachea normal.   Cardiovascular:      Rate and Rhythm: Normal rate and regular rhythm.      Heart sounds: Normal heart sounds. No murmur heard.     No gallop.   Pulmonary:      Effort: Pulmonary " effort is normal.      Breath sounds: Normal breath sounds and air entry. No wheezing, rhonchi or rales.   Musculoskeletal:         General: No tenderness or deformity. Normal range of motion.      Cervical back: Full passive range of motion without pain, normal range of motion and neck supple.      Thoracic back: Normal.      Right lower leg: No edema.      Left lower leg: No edema.   Skin:     General: Skin is warm and dry.      Coloration: Skin is not jaundiced.      Findings: No rash.   Neurological:      Mental Status: She is alert and oriented to person, place, and time.      Sensory: Sensation is intact.      Motor: Motor function is intact.      Coordination: Coordination is intact.      Gait: Gait is intact.      Deep Tendon Reflexes: Reflexes are normal and symmetric.   Psychiatric:         Mood and Affect: Mood and affect normal.         Behavior: Behavior normal.         Judgment: Judgment normal.             Result Review  Data Reviewed:                   Assessment and Plan      Diagnoses and all orders for this visit:    1. Mood disorder  -     Cariprazine HCl (Vraylar) 4.5 MG capsule capsule; Take 1 capsule by mouth Daily.  -     lamoTRIgine (LaMICtal) 100 MG tablet; Take 2 tablets by mouth Daily.    2. Irritability  -     Cariprazine HCl (Vraylar) 4.5 MG capsule capsule; Take 1 capsule by mouth Daily.  -     lamoTRIgine (LaMICtal) 100 MG tablet; Take 2 tablets by mouth Daily.    3. Class 2 obesity due to excess calories without serious comorbidity with body mass index (BMI) of 39.0 to 39.9 in adult  -     phentermine (Adipex-P) 37.5 MG tablet; Take 1 tablet by mouth Every Morning Before Breakfast.    ofelia is being seen today for a medication follow-up for mood and for weight loss.  She has been taking phentermine now for 3 months.  She has been very successful in her weight loss.  She has lost a total of 25 pounds.  Other medications are working well and she is needing a refill.  Plan  Continue  vraylar 4.5mg, lamictal 200 mg, and adipex 37.5 mg        Follow Up   Return in about 3 months (around 10/19/2024) for Recheck.  Patient was given instructions and counseling regarding her condition or for health maintenance advice. Please see specific information pulled into the AVS if appropriate.

## 2024-08-22 ENCOUNTER — TELEPHONE (OUTPATIENT)
Dept: FAMILY MEDICINE CLINIC | Facility: CLINIC | Age: 34
End: 2024-08-22
Payer: MEDICARE

## 2024-08-22 NOTE — TELEPHONE ENCOUNTER
Attempted to call pt and left voicemail    HUB RELAY  Hello I tried calling you but left a voicemail. I was calling because you're due for a medicare wellness exam and we didn't schedule your 3 month follow up from your last visit. To make it easier for you we can do your 3 month follow up and medicare wellness together. You can schedule it around the middle or end of October but not on Mondays because Sabiha V is off on Mondays. If you have any questions or concerns just let me know and you can call the office at [phone]. Thanks! -Nancy HEMPHILL

## 2024-08-23 ENCOUNTER — TELEPHONE (OUTPATIENT)
Dept: FAMILY MEDICINE CLINIC | Facility: CLINIC | Age: 34
End: 2024-08-23
Payer: MEDICARE

## 2024-08-23 NOTE — TELEPHONE ENCOUNTER
Called pt and confirmed . Told pt I was calling in regards of getting her scheduled for a medicare wellness because she is due for one and her 3 month follow up wasn't scheduled. Explained to pt she can do her 3 month follow up and medicare wellness together in October if she would like. Pt states that'll be perfect any day would be okay and afternoon time. Asked pt if  1:00pm would be okay and pt voice agreed and appreciated for getting her scheduled

## 2024-09-16 DIAGNOSIS — F39 MOOD DISORDER: ICD-10-CM

## 2024-09-18 RX ORDER — CARIPRAZINE 3 MG/1
3 CAPSULE, GELATIN COATED ORAL DAILY
Qty: 30 CAPSULE | Refills: 2 | OUTPATIENT
Start: 2024-09-18

## 2024-10-25 ENCOUNTER — LAB (OUTPATIENT)
Dept: LAB | Facility: HOSPITAL | Age: 34
End: 2024-10-25
Payer: MEDICARE

## 2024-10-25 ENCOUNTER — OFFICE VISIT (OUTPATIENT)
Dept: FAMILY MEDICINE CLINIC | Facility: CLINIC | Age: 34
End: 2024-10-25
Payer: MEDICARE

## 2024-10-25 VITALS
WEIGHT: 254 LBS | SYSTOLIC BLOOD PRESSURE: 125 MMHG | BODY MASS INDEX: 42.32 KG/M2 | DIASTOLIC BLOOD PRESSURE: 86 MMHG | HEART RATE: 83 BPM | OXYGEN SATURATION: 98 % | HEIGHT: 65 IN

## 2024-10-25 DIAGNOSIS — Z00.00 MEDICARE ANNUAL WELLNESS VISIT, SUBSEQUENT: ICD-10-CM

## 2024-10-25 DIAGNOSIS — E55.9 VITAMIN D DEFICIENCY: ICD-10-CM

## 2024-10-25 DIAGNOSIS — Z00.00 MEDICARE ANNUAL WELLNESS VISIT, SUBSEQUENT: Primary | ICD-10-CM

## 2024-10-25 DIAGNOSIS — E66.812 CLASS 2 OBESITY DUE TO EXCESS CALORIES WITHOUT SERIOUS COMORBIDITY WITH BODY MASS INDEX (BMI) OF 39.0 TO 39.9 IN ADULT: ICD-10-CM

## 2024-10-25 DIAGNOSIS — E66.09 CLASS 2 OBESITY DUE TO EXCESS CALORIES WITHOUT SERIOUS COMORBIDITY WITH BODY MASS INDEX (BMI) OF 39.0 TO 39.9 IN ADULT: ICD-10-CM

## 2024-10-25 DIAGNOSIS — R45.4 IRRITABILITY: ICD-10-CM

## 2024-10-25 DIAGNOSIS — F39 MOOD DISORDER: ICD-10-CM

## 2024-10-25 LAB
ALBUMIN SERPL-MCNC: 4.4 G/DL (ref 3.5–5)
ALBUMIN/GLOB SERPL: 1.3 G/DL (ref 1.1–2.5)
ALP SERPL-CCNC: 98 U/L (ref 24–120)
ALT SERPL W P-5'-P-CCNC: 27 U/L (ref 0–35)
ANION GAP SERPL CALCULATED.3IONS-SCNC: 9 MMOL/L (ref 4–13)
AST SERPL-CCNC: 27 U/L (ref 7–45)
AUTO MIXED CELLS #: 0.4 10*3/MM3 (ref 0.1–2.6)
AUTO MIXED CELLS %: 5.9 % (ref 0.1–24)
BILIRUB SERPL-MCNC: 0.5 MG/DL (ref 0.1–1)
BILIRUB UR QL STRIP: NEGATIVE
BUN SERPL-MCNC: 6 MG/DL (ref 5–21)
BUN/CREAT SERPL: 6.7
CALCIUM SPEC-SCNC: 9.1 MG/DL (ref 8.6–10.5)
CHLORIDE SERPL-SCNC: 99 MMOL/L (ref 98–110)
CHOLEST SERPL-MCNC: 137 MG/DL (ref 130–200)
CLARITY UR: CLEAR
CO2 SERPL-SCNC: 28 MMOL/L (ref 24–31)
COLOR UR: YELLOW
CREAT SERPL-MCNC: 0.9 MG/DL (ref 0.5–1.4)
EGFRCR SERPLBLD CKD-EPI 2021: 87.3 ML/MIN/1.73
ERYTHROCYTE [DISTWIDTH] IN BLOOD BY AUTOMATED COUNT: 12.2 % (ref 12.3–15.4)
GLOBULIN UR ELPH-MCNC: 3.3 GM/DL
GLUCOSE SERPL-MCNC: 96 MG/DL (ref 70–100)
GLUCOSE UR STRIP-MCNC: NEGATIVE MG/DL
HCT VFR BLD AUTO: 37.7 % (ref 34–46.6)
HDLC SERPL-MCNC: 60 MG/DL
HGB BLD-MCNC: 12.4 G/DL (ref 12–15.9)
HGB UR QL STRIP.AUTO: NEGATIVE
KETONES UR QL STRIP: NEGATIVE
LDLC SERPL CALC-MCNC: 64 MG/DL (ref 0–99)
LDLC/HDLC SERPL: 1.07 {RATIO}
LEUKOCYTE ESTERASE UR QL STRIP.AUTO: NEGATIVE
LYMPHOCYTES # BLD AUTO: 2.6 10*3/MM3 (ref 0.7–3.1)
LYMPHOCYTES NFR BLD AUTO: 42.1 % (ref 19.6–45.3)
MCH RBC QN AUTO: 29 PG (ref 26.6–33)
MCHC RBC AUTO-ENTMCNC: 32.9 G/DL (ref 31.5–35.7)
MCV RBC AUTO: 88.1 FL (ref 79–97)
NEUTROPHILS NFR BLD AUTO: 3.1 10*3/MM3 (ref 1.7–7)
NEUTROPHILS NFR BLD AUTO: 52 % (ref 42.7–76)
NITRITE UR QL STRIP: NEGATIVE
PH UR STRIP.AUTO: 7 [PH] (ref 5–8)
PLATELET # BLD AUTO: 324 10*3/MM3 (ref 140–450)
PMV BLD AUTO: 8.8 FL (ref 6–12)
POTASSIUM SERPL-SCNC: 4.1 MMOL/L (ref 3.5–5.3)
PROT SERPL-MCNC: 7.7 G/DL (ref 6.3–8.7)
PROT UR QL STRIP: NEGATIVE
RBC # BLD AUTO: 4.28 10*6/MM3 (ref 3.77–5.28)
SODIUM SERPL-SCNC: 136 MMOL/L (ref 135–145)
SP GR UR STRIP: 1.02 (ref 1–1.03)
TRIGL SERPL-MCNC: 65 MG/DL (ref 0–149)
UROBILINOGEN UR QL STRIP: NORMAL
VLDLC SERPL-MCNC: 13 MG/DL (ref 5–40)
WBC NRBC COR # BLD AUTO: 6.1 10*3/MM3 (ref 3.4–10.8)

## 2024-10-25 PROCEDURE — 1126F AMNT PAIN NOTED NONE PRSNT: CPT | Performed by: NURSE PRACTITIONER

## 2024-10-25 PROCEDURE — 80061 LIPID PANEL: CPT

## 2024-10-25 PROCEDURE — 82306 VITAMIN D 25 HYDROXY: CPT

## 2024-10-25 PROCEDURE — 85025 COMPLETE CBC W/AUTO DIFF WBC: CPT

## 2024-10-25 PROCEDURE — G0439 PPPS, SUBSEQ VISIT: HCPCS | Performed by: NURSE PRACTITIONER

## 2024-10-25 PROCEDURE — 36415 COLL VENOUS BLD VENIPUNCTURE: CPT

## 2024-10-25 PROCEDURE — 81003 URINALYSIS AUTO W/O SCOPE: CPT

## 2024-10-25 PROCEDURE — 80053 COMPREHEN METABOLIC PANEL: CPT

## 2024-10-25 PROCEDURE — 1170F FXNL STATUS ASSESSED: CPT | Performed by: NURSE PRACTITIONER

## 2024-10-25 PROCEDURE — 84443 ASSAY THYROID STIM HORMONE: CPT

## 2024-10-25 RX ORDER — SEMAGLUTIDE 0.25 MG/.5ML
0.25 INJECTION, SOLUTION SUBCUTANEOUS WEEKLY
Qty: 2 ML | Refills: 2 | Status: SHIPPED | OUTPATIENT
Start: 2024-10-25

## 2024-10-25 RX ORDER — LAMOTRIGINE 100 MG/1
200 TABLET ORAL DAILY
Qty: 30 TABLET | Refills: 2 | Status: SHIPPED | OUTPATIENT
Start: 2024-10-25

## 2024-10-25 NOTE — PROGRESS NOTES
Subjective   The ABCs of the Annual Wellness Visit  Medicare Wellness Visit      Elisa Barrett is a 34 y.o. patient who presents for a Medicare Wellness Visit.    The following portions of the patient's history were reviewed and   updated as appropriate: allergies, current medications, past family history, past medical history, past social history, past surgical history, and problem list.    Compared to one year ago, the patient's physical   health is the same.  Compared to one year ago, the patient's mental   health is the same.    Recent Hospitalizations:  She was not admitted to the hospital during the last year.     Current Medical Providers:  Patient Care Team:  Yesenia Hines APRN as PCP - General (Nurse Practitioner)    Outpatient Medications Prior to Visit   Medication Sig Dispense Refill    Vienva 0.1-20 MG-MCG per tablet Take 1 tablet by mouth Daily.      Cariprazine HCl (Vraylar) 4.5 MG capsule capsule Take 1 capsule by mouth Daily.      lamoTRIgine (LaMICtal) 100 MG tablet Take 2 tablets by mouth Daily.      phentermine (Adipex-P) 37.5 MG tablet Take 1 tablet by mouth Every Morning Before Breakfast.       No facility-administered medications prior to visit.     No opioid medication identified on active medication list. I have reviewed chart for other potential  high risk medication/s and harmful drug interactions in the elderly.      Aspirin is not on active medication list.  Aspirin use is not indicated based on review of current medical condition/s. Risk of harm outweighs potential benefits.  .    Patient Active Problem List   Diagnosis    Mood disorder    Attention deficit hyperactivity disorder (ADHD), combined type    Morbid obesity    Encounter for hepatitis C screening test for low risk patient     Advance Care Planning Advance Directive is not on file.  ACP discussion was declined by the patient. Patient does not have an advance directive, declines further assistance.   "          Objective   Vitals:    10/25/24 1417   BP: 125/86   Pulse: 83   SpO2: 98%   Weight: 115 kg (254 lb)   Height: 165.1 cm (65\")   PainSc: 0-No pain       Estimated body mass index is 42.27 kg/m² as calculated from the following:    Height as of this encounter: 165.1 cm (65\").    Weight as of this encounter: 115 kg (254 lb).            Does the patient have evidence of cognitive impairment? Yes                                                                                                Health  Risk Assessment    Smoking Status:  Social History     Tobacco Use   Smoking Status Never   Smokeless Tobacco Never   Tobacco Comments    vape      Alcohol Consumption:  Social History     Substance and Sexual Activity   Alcohol Use No       Fall Risk Screen  STEADI Fall Risk Assessment was completed, and patient is at LOW risk for falls.Assessment completed on:10/25/2024    Depression Screening:      10/25/2024     2:14 PM   PHQ-2/PHQ-9 Depression Screening   Little interest or pleasure in doing things Not at all   Feeling down, depressed, or hopeless Almost all   Trouble falling or staying asleep, or sleeping too much Not at all   Feeling tired or having little energy Almost all   Poor appetite or overeating Almost all   Feeling bad about yourself - or that you are a failure or have let yourself or your family down Not at all   Trouble concentrating on things, such as reading the newspaper or watching television Several days   Moving or speaking so slowly that other people could have noticed? Or the opposite - being so fidgety or restless that you have been moving around a lot more than usual. Several days   Thoughts that you would be better off dead or hurting yourself in some way Not at all   Patient Health Questionnaire-9 Score 11   How difficult have these problems made it for you to do your work, take care of things at home, or get along with other people? Very difficult     Health Habits and Functional and " Cognitive Screening:      10/25/2024     2:15 PM   Functional & Cognitive Status   Do you have difficulty preparing food and eating? No   Do you have difficulty bathing yourself, getting dressed or grooming yourself? No   Do you have difficulty using the toilet? No   Do you have difficulty moving around from place to place? No   Do you have trouble with steps or getting out of a bed or a chair? No   Current Diet Unhealthy Diet   Dental Exam Not up to date   Eye Exam Not up to date   Exercise (times per week) 7 times per week   Current Exercises Include No Regular Exercise;Walking   Do you need help using the phone?  No   Are you deaf or do you have serious difficulty hearing?  No   Do you need help to go to places out of walking distance? No   Do you need help shopping? No   Do you need help preparing meals?  No   Do you need help with housework?  No   Do you need help with laundry? No   Do you need help taking your medications? No   Do you need help managing money? No   Do you ever drive or ride in a car without wearing a seat belt? No   Have you felt unusual stress, anger or loneliness in the last month? No   Who do you live with? Other   If you need help, do you have trouble finding someone available to you? No   Have you been bothered in the last four weeks by sexual problems? No   Do you have difficulty concentrating, remembering or making decisions? No           Age-appropriate Screening Schedule:  Refer to the list below for future screening recommendations based on patient's age, sex and/or medical conditions. Orders for these recommended tests are listed in the plan section. The patient has been provided with a written plan.    Health Maintenance List  Health Maintenance   Topic Date Due    TDAP/TD VACCINES (3 - Td or Tdap) 09/12/2023    COVID-19 Vaccine (1 - 2023-24 season) Never done    INFLUENZA VACCINE  03/31/2025 (Originally 8/1/2024)    PAP SMEAR  03/31/2025    BMI FOLLOWUP  07/19/2025    ANNUAL  "WELLNESS VISIT  10/25/2025    HEPATITIS C SCREENING  Completed    Pneumococcal Vaccine 0-64  Aged Out                                                                                                                                                CMS Preventative Services Quick Reference  Risk Factors Identified During Encounter  None Identified    The above risks/problems have been discussed with the patient.  Pertinent information has been shared with the patient in the After Visit Summary.  An After Visit Summary and PPPS were made available to the patient.    Follow Up:   Next Medicare Wellness visit to be scheduled in 1 year.         Additional E&M Note during same encounter follows:  Patient has additional, significant, and separately identifiable condition(s)/problem(s) that require work above and beyond the Medicare Wellness Visit     Chief Complaint  Medicare Wellness-subsequent    Subjective   Pt presents today for medicare wellness. Pt tolerating well, no problems or concerns at this time     Elisa is also being seen today for an annual adult preventative physical exam.                 Objective   Vital Signs:  /86   Pulse 83   Ht 165.1 cm (65\")   Wt 115 kg (254 lb)   SpO2 98%   BMI 42.27 kg/m²   Physical Exam  Vitals and nursing note reviewed.   Constitutional:       Appearance: Normal appearance. She is well-developed. She is obese.   HENT:      Head: Normocephalic and atraumatic.      Right Ear: Tympanic membrane, ear canal and external ear normal.      Left Ear: Tympanic membrane, ear canal and external ear normal.      Nose: Nose normal. No septal deviation, nasal tenderness or congestion.      Mouth/Throat:      Lips: Pink. No lesions.      Mouth: Mucous membranes are moist. No oral lesions.      Dentition: Normal dentition.      Pharynx: Oropharynx is clear. No pharyngeal swelling, oropharyngeal exudate or posterior oropharyngeal erythema.   Eyes:      General: Lids are normal. Vision " grossly intact. No scleral icterus.        Right eye: No discharge.         Left eye: No discharge.      Extraocular Movements: Extraocular movements intact.      Conjunctiva/sclera: Conjunctivae normal.      Right eye: Right conjunctiva is not injected.      Left eye: Left conjunctiva is not injected.      Pupils: Pupils are equal, round, and reactive to light.   Neck:      Thyroid: No thyroid mass.      Trachea: Trachea normal.   Cardiovascular:      Rate and Rhythm: Normal rate and regular rhythm.      Heart sounds: Normal heart sounds. No murmur heard.     No gallop.   Pulmonary:      Effort: Pulmonary effort is normal.      Breath sounds: Normal breath sounds and air entry. No wheezing, rhonchi or rales.   Musculoskeletal:         General: No tenderness or deformity. Normal range of motion.      Cervical back: Full passive range of motion without pain, normal range of motion and neck supple.      Thoracic back: Normal.      Right lower leg: No edema.      Left lower leg: No edema.   Skin:     General: Skin is warm and dry.      Coloration: Skin is not jaundiced.      Findings: No rash.   Neurological:      Mental Status: She is alert and oriented to person, place, and time. Mental status is at baseline.      Sensory: Sensation is intact.      Motor: Motor function is intact.      Coordination: Coordination is intact.      Gait: Gait is intact.      Deep Tendon Reflexes: Reflexes are normal and symmetric.   Psychiatric:         Mood and Affect: Mood and affect normal.         Behavior: Behavior normal.         Judgment: Judgment normal.         The following data was reviewed by: ANNETTE Nick on 10/25/2024:        Assessment and Plan Additional age appropriate preventative wellness advice topics were discussed during today's preventative wellness exam(some topics already addressed during AWV portion of the note above):    Physical Activity: Advised cardiovascular activity 150 minutes per week as  tolerated. (example brisk walk for 30 minutes, 5 days a week).     Nutrition: Discussed nutrition plan with patient. Information shared in after visit summary. Goal is for a well balanced diet to enhance overall health.     Healthy Weight: Discussed current and goal BMI with patient. Steps to attain this goal discussed. Information shared in after visit summary.              Medicare annual wellness visit, subsequent  Exam today  Labs today  Mood disorder  Stable. Cont med  Irritability  Stable cont med.   Vitamin D deficiency  Check labs  Class 2 obesity due to excess calories without serious comorbidity with body mass index (BMI) of 39.0 to 39.9 in adult  Has been on phentermine for 6 months. She had weight gain this last time.   Will try to get Wegovy approved.     Orders Placed This Encounter   Procedures    CBC Auto Differential     Standing Status:   Future     Standing Expiration Date:   10/25/2025     Order Specific Question:   Release to patient     Answer:   Routine Release [[id number]]    Comprehensive Metabolic Panel     Standing Status:   Future     Standing Expiration Date:   10/25/2025     Order Specific Question:   Release to patient     Answer:   Routine Release [[id number]]    Lipid Panel     Standing Status:   Future     Standing Expiration Date:   10/25/2025     Order Specific Question:   Release to patient     Answer:   Routine Release [[id number]]    Urinalysis With Culture If Indicated - Urine, Clean Catch     Standing Status:   Future     Standing Expiration Date:   10/25/2025     Order Specific Question:   Release to patient     Answer:   Routine Release [[id number]]    TSH     Standing Status:   Future     Standing Expiration Date:   10/25/2025     Order Specific Question:   Release to patient     Answer:   Routine Release [[id number]]    Vitamin D,25-Hydroxy     Standing Status:   Future     Standing Expiration Date:   10/25/2025     Order Specific Question:   Release to patient      Answer:   Routine Release [[id number]]     New Medications Ordered This Visit   Medications    Cariprazine HCl (Vraylar) 4.5 MG capsule capsule     Sig: Take 1 capsule by mouth Daily.     Dispense:  30 capsule     Refill:  2    lamoTRIgine (LaMICtal) 100 MG tablet     Sig: Take 2 tablets by mouth Daily.     Dispense:  30 tablet     Refill:  2    Semaglutide-Weight Management (Wegovy) 0.25 MG/0.5ML solution auto-injector     Sig: Inject 0.5 mL under the skin into the appropriate area as directed 1 (One) Time Per Week.     Dispense:  2 mL     Refill:  2          Follow Up   Return in about 1 month (around 11/25/2024) for Recheck.  Patient was given instructions and counseling regarding her condition or for health maintenance advice. Please see specific information pulled into the AVS if appropriate.

## 2024-10-26 LAB
25(OH)D3 SERPL-MCNC: 24.5 NG/ML (ref 30–100)
TSH SERPL DL<=0.05 MIU/L-ACNC: 2.18 UIU/ML (ref 0.27–4.2)

## 2024-10-30 ENCOUNTER — TELEPHONE (OUTPATIENT)
Dept: FAMILY MEDICINE CLINIC | Facility: CLINIC | Age: 34
End: 2024-10-30
Payer: MEDICARE

## 2024-10-30 NOTE — PROGRESS NOTES
Vitamin d is low. Take 5000 units otc daily  Cmp is normal  Cbc is normal  Tsh normal  Cholesterol is normal  Urine is clear

## 2024-10-30 NOTE — TELEPHONE ENCOUNTER
----- Message from Yesenia Hines sent at 10/30/2024  7:32 AM CDT -----  Vitamin d is low. Take 5000 units otc daily  Cmp is normal  Cbc is normal  Tsh normal  Cholesterol is normal  Urine is clear

## 2024-11-26 ENCOUNTER — OFFICE VISIT (OUTPATIENT)
Dept: FAMILY MEDICINE CLINIC | Facility: CLINIC | Age: 34
End: 2024-11-26
Payer: MEDICARE

## 2024-11-26 VITALS
WEIGHT: 253 LBS | DIASTOLIC BLOOD PRESSURE: 88 MMHG | HEIGHT: 65 IN | OXYGEN SATURATION: 98 % | HEART RATE: 76 BPM | BODY MASS INDEX: 42.15 KG/M2 | SYSTOLIC BLOOD PRESSURE: 122 MMHG

## 2024-11-26 DIAGNOSIS — E66.813 CLASS 3 SEVERE OBESITY DUE TO EXCESS CALORIES WITHOUT SERIOUS COMORBIDITY WITH BODY MASS INDEX (BMI) OF 40.0 TO 44.9 IN ADULT: ICD-10-CM

## 2024-11-26 DIAGNOSIS — R45.4 IRRITABILITY: ICD-10-CM

## 2024-11-26 DIAGNOSIS — E66.01 CLASS 3 SEVERE OBESITY DUE TO EXCESS CALORIES WITHOUT SERIOUS COMORBIDITY WITH BODY MASS INDEX (BMI) OF 40.0 TO 44.9 IN ADULT: ICD-10-CM

## 2024-11-26 DIAGNOSIS — F39 MOOD DISORDER: Primary | ICD-10-CM

## 2024-11-26 PROCEDURE — G2211 COMPLEX E/M VISIT ADD ON: HCPCS | Performed by: NURSE PRACTITIONER

## 2024-11-26 PROCEDURE — 1160F RVW MEDS BY RX/DR IN RCRD: CPT | Performed by: NURSE PRACTITIONER

## 2024-11-26 PROCEDURE — 1126F AMNT PAIN NOTED NONE PRSNT: CPT | Performed by: NURSE PRACTITIONER

## 2024-11-26 PROCEDURE — 99214 OFFICE O/P EST MOD 30 MIN: CPT | Performed by: NURSE PRACTITIONER

## 2024-11-26 PROCEDURE — 1159F MED LIST DOCD IN RCRD: CPT | Performed by: NURSE PRACTITIONER

## 2024-11-26 RX ORDER — PHENTERMINE HYDROCHLORIDE 37.5 MG/1
37.5 CAPSULE ORAL EVERY MORNING
Qty: 30 CAPSULE | Refills: 1 | Status: SHIPPED | OUTPATIENT
Start: 2024-11-26

## 2024-11-26 RX ORDER — LAMOTRIGINE 100 MG/1
200 TABLET ORAL DAILY
Qty: 30 TABLET | Refills: 2 | Status: SHIPPED | OUTPATIENT
Start: 2024-11-26

## 2024-11-26 NOTE — PROGRESS NOTES
"Chief Complaint  Weight Loss    Subjective    History of Present Illness      Patient presents to [facility] for   History of Present Illness  Pt presents today for 1 month follow up on weight loss. Pt states her insurance didn't approve the [employer]    I have reviewed and agree with the HPI information as above.  Yesenia Hines, APRN     Objective   Vital Signs:   /88   Pulse 76   Ht 165.1 cm (65\")   Wt 115 kg (253 lb)   SpO2 98%   BMI 42.10 kg/m²            Physical Exam  Vitals and nursing note reviewed.   Constitutional:       Appearance: Normal appearance. She is well-developed. She is obese.   HENT:      Head: Normocephalic and atraumatic.      Right Ear: Tympanic membrane, ear canal and external ear normal.      Left Ear: Tympanic membrane, ear canal and external ear normal.      Nose: Nose normal. No septal deviation, nasal tenderness or congestion.      Mouth/Throat:      Lips: Pink. No lesions.      Mouth: Mucous membranes are moist. No oral lesions.      Dentition: Normal dentition.      Pharynx: Oropharynx is clear. No pharyngeal swelling, oropharyngeal exudate or posterior oropharyngeal erythema.   Eyes:      General: Lids are normal. Vision grossly intact. No scleral icterus.        Right eye: No discharge.         Left eye: No discharge.      Extraocular Movements: Extraocular movements intact.      Conjunctiva/sclera: Conjunctivae normal.      Right eye: Right conjunctiva is not injected.      Left eye: Left conjunctiva is not injected.      Pupils: Pupils are equal, round, and reactive to light.   Neck:      Thyroid: No thyroid mass.      Trachea: Trachea normal.   Cardiovascular:      Rate and Rhythm: Normal rate and regular rhythm.      Heart sounds: Normal heart sounds. No murmur heard.     No gallop.   Pulmonary:      Effort: Pulmonary effort is normal.      Breath sounds: Normal breath sounds and air entry. No wheezing, " rhonchi or rales.   Musculoskeletal:         General: No tenderness or deformity. Normal range of motion.      Cervical back: Full passive range of motion without pain, normal range of motion and neck supple.      Thoracic back: Normal.      Right lower leg: No edema.      Left lower leg: No edema.   Skin:     General: Skin is warm and dry.      Coloration: Skin is not jaundiced.      Findings: No rash.   Neurological:      Mental Status: She is alert and oriented to person, place, and time.      Sensory: Sensation is intact.      Motor: Motor function is intact.      Coordination: Coordination is intact.      Gait: Gait is intact.      Deep Tendon Reflexes: Reflexes are normal and symmetric.   Psychiatric:         Mood and Affect: Mood and affect normal.         Behavior: Behavior normal.         Judgment: Judgment normal.                    Result Review  Data Reviewed:                   Assessment and Plan      Diagnoses and all orders for this visit:    1. Mood disorder (Primary)  Comments:  Stable. cont Vraylar and lamictal  Orders:  -     Cariprazine HCl (Vraylar) 4.5 MG capsule capsule; Take 1 capsule by mouth Daily.  Dispense: 30 capsule; Refill: 2  -     lamoTRIgine (LaMICtal) 100 MG tablet; Take 2 tablets by mouth Daily.  Dispense: 30 tablet; Refill: 2    2. Irritability  Comments:  cont lamictal and vraylar  Orders:  -     Cariprazine HCl (Vraylar) 4.5 MG capsule capsule; Take 1 capsule by mouth Daily.  Dispense: 30 capsule; Refill: 2  -     lamoTRIgine (LaMICtal) 100 MG tablet; Take 2 tablets by mouth Daily.  Dispense: 30 tablet; Refill: 2    3. Class 3 severe obesity due to excess calories without serious comorbidity with body mass index (BMI) of 40.0 to 44.9 in adult  Comments:  wegovy denied.   cont phentermine at this time  Orders:  -     phentermine 37.5 MG capsule; Take 1 capsule by mouth Every Morning.  Dispense: 30 capsule; Refill: 1            Follow Up   Return in about 1 month (around  12/26/2024) for Recheck.  Patient was given instructions and counseling regarding her condition or for health maintenance advice. Please see specific information pulled into the AVS if appropriate.

## 2024-12-26 ENCOUNTER — OFFICE VISIT (OUTPATIENT)
Dept: FAMILY MEDICINE CLINIC | Facility: CLINIC | Age: 34
End: 2024-12-26
Payer: MEDICARE

## 2024-12-26 VITALS
WEIGHT: 250 LBS | HEART RATE: 93 BPM | TEMPERATURE: 97.7 F | SYSTOLIC BLOOD PRESSURE: 136 MMHG | DIASTOLIC BLOOD PRESSURE: 83 MMHG | HEIGHT: 65 IN | BODY MASS INDEX: 41.65 KG/M2 | RESPIRATION RATE: 20 BRPM

## 2024-12-26 DIAGNOSIS — E66.01 CLASS 3 SEVERE OBESITY DUE TO EXCESS CALORIES WITHOUT SERIOUS COMORBIDITY WITH BODY MASS INDEX (BMI) OF 40.0 TO 44.9 IN ADULT: ICD-10-CM

## 2024-12-26 DIAGNOSIS — E66.813 CLASS 3 SEVERE OBESITY DUE TO EXCESS CALORIES WITHOUT SERIOUS COMORBIDITY WITH BODY MASS INDEX (BMI) OF 40.0 TO 44.9 IN ADULT: ICD-10-CM

## 2024-12-26 PROCEDURE — 1160F RVW MEDS BY RX/DR IN RCRD: CPT | Performed by: NURSE PRACTITIONER

## 2024-12-26 PROCEDURE — G2211 COMPLEX E/M VISIT ADD ON: HCPCS | Performed by: NURSE PRACTITIONER

## 2024-12-26 PROCEDURE — 1126F AMNT PAIN NOTED NONE PRSNT: CPT | Performed by: NURSE PRACTITIONER

## 2024-12-26 PROCEDURE — 99213 OFFICE O/P EST LOW 20 MIN: CPT | Performed by: NURSE PRACTITIONER

## 2024-12-26 PROCEDURE — 1159F MED LIST DOCD IN RCRD: CPT | Performed by: NURSE PRACTITIONER

## 2024-12-26 RX ORDER — PHENTERMINE HYDROCHLORIDE 37.5 MG/1
37.5 CAPSULE ORAL EVERY MORNING
Qty: 30 CAPSULE | Refills: 1 | Status: SHIPPED | OUTPATIENT
Start: 2024-12-26

## 2024-12-26 NOTE — PROGRESS NOTES
"Chief Complaint  Obesity    Subjective    History of Present Illness      Patient presents to [facility] for   History of Present Illness  Pt is here today for a 1 month f/u for phentermine.  No concerns voiced when asked.       Review of Systems    I have reviewed and agree with the HPI information as above.  Yesenia Hines, APRN     Objective   Vital Signs:   /83   Pulse 93   Temp 97.7 °F (36.5 °C) (Temporal)   Resp 20   Ht 165.1 cm (65\")   Wt 113 kg (250 lb)   BMI 41.60 kg/m²            Physical Exam  Vitals and nursing note reviewed.   Constitutional:       Appearance: Normal appearance. She is well-developed. She is obese.   HENT:      Head: Normocephalic and atraumatic.      Right Ear: Tympanic membrane, ear canal and external ear normal.      Left Ear: Tympanic membrane, ear canal and external ear normal.      Nose: Nose normal. No septal deviation, nasal tenderness or congestion.      Mouth/Throat:      Lips: Pink. No lesions.      Mouth: Mucous membranes are moist. No oral lesions.      Dentition: Normal dentition.      Pharynx: Oropharynx is clear. No pharyngeal swelling, oropharyngeal exudate or posterior oropharyngeal erythema.   Eyes:      General: Lids are normal. Vision grossly intact. No scleral icterus.        Right eye: No discharge.         Left eye: No discharge.      Extraocular Movements: Extraocular movements intact.      Conjunctiva/sclera: Conjunctivae normal.      Right eye: Right conjunctiva is not injected.      Left eye: Left conjunctiva is not injected.      Pupils: Pupils are equal, round, and reactive to light.   Neck:      Thyroid: No thyroid mass.      Trachea: Trachea normal.   Cardiovascular:      Rate and Rhythm: Normal rate and regular rhythm.      Heart sounds: Normal heart sounds. No murmur heard.     No gallop.   Pulmonary:      Effort: Pulmonary effort is normal.      Breath sounds: Normal breath sounds and air entry. No " wheezing, rhonchi or rales.   Musculoskeletal:         General: No tenderness or deformity. Normal range of motion.      Cervical back: Full passive range of motion without pain, normal range of motion and neck supple.      Thoracic back: Normal.      Right lower leg: No edema.      Left lower leg: No edema.   Skin:     General: Skin is warm and dry.      Coloration: Skin is not jaundiced.      Findings: No rash.   Neurological:      Mental Status: She is alert and oriented to person, place, and time.      Sensory: Sensation is intact.      Motor: Motor function is intact.      Coordination: Coordination is intact.      Gait: Gait is intact.      Deep Tendon Reflexes: Reflexes are normal and symmetric.   Psychiatric:         Mood and Affect: Mood and affect normal.         Behavior: Behavior normal.         Judgment: Judgment normal.                    Result Review  Data Reviewed:                   Assessment and Plan      Diagnoses and all orders for this visit:    1. Class 3 severe obesity due to excess calories without serious comorbidity with body mass index (BMI) of 40.0 to 44.9 in adult  Comments:  wegovy denied.   cont phentermine at this time  Orders:  -     phentermine 37.5 MG capsule; Take 1 capsule by mouth Every Morning.  Dispense: 30 capsule; Refill: 1      Patient has lost 3 pounds since the last visit.  I have counseled her on diet once again.  She continues to drink Dr. Pepper Mountain Dew along with at least 2 cupcakes a day.  I advised her to stop this.  I am okay to continue phentermine at this time, unfortunately we will have to stop this in the near future.      Follow Up   Return in about 3 months (around 3/26/2025) for Recheck.  Patient was given instructions and counseling regarding her condition or for health maintenance advice. Please see specific information pulled into the AVS if appropriate.

## 2025-01-22 DIAGNOSIS — F39 MOOD DISORDER: ICD-10-CM

## 2025-01-22 DIAGNOSIS — R45.4 IRRITABILITY: ICD-10-CM

## 2025-01-22 RX ORDER — LAMOTRIGINE 100 MG/1
200 TABLET ORAL DAILY
Qty: 60 TABLET | Refills: 1 | Status: SHIPPED | OUTPATIENT
Start: 2025-01-22

## 2025-01-22 NOTE — TELEPHONE ENCOUNTER
Rx Refill Note  Requested Prescriptions     Pending Prescriptions Disp Refills    lamoTRIgine (LaMICtal) 100 MG tablet [Pharmacy Med Name: LAMOTRIGINE 100 MG TABLET] 60 tablet 1     Sig: TAKE 2 TABLETS BY MOUTH EVERY DAY      Last office visit with prescribing clinician: 12/26/2024   Last telemedicine visit with prescribing clinician: Visit date not found   Next office visit with prescribing clinician: Visit date not found      Claudia Colby MA  01/22/25, 10:36 CST

## 2025-03-10 PROCEDURE — 36415 COLL VENOUS BLD VENIPUNCTURE: CPT

## 2025-03-10 PROCEDURE — 99284 EMERGENCY DEPT VISIT MOD MDM: CPT

## 2025-03-10 RX ORDER — PRENATAL VIT NO.126/IRON/FOLIC 28MG-0.8MG
1 TABLET ORAL DAILY
COMMUNITY

## 2025-03-11 ENCOUNTER — APPOINTMENT (OUTPATIENT)
Dept: ULTRASOUND IMAGING | Facility: HOSPITAL | Age: 35
End: 2025-03-11
Payer: MEDICARE

## 2025-03-11 ENCOUNTER — HOSPITAL ENCOUNTER (EMERGENCY)
Facility: HOSPITAL | Age: 35
Discharge: HOME | End: 2025-03-11
Attending: STUDENT IN AN ORGANIZED HEALTH CARE EDUCATION/TRAINING PROGRAM | Admitting: STUDENT IN AN ORGANIZED HEALTH CARE EDUCATION/TRAINING PROGRAM
Payer: MEDICARE

## 2025-03-11 VITALS
SYSTOLIC BLOOD PRESSURE: 133 MMHG | HEART RATE: 83 BPM | BODY MASS INDEX: 40.18 KG/M2 | WEIGHT: 250 LBS | DIASTOLIC BLOOD PRESSURE: 71 MMHG | OXYGEN SATURATION: 99 % | TEMPERATURE: 98.1 F | RESPIRATION RATE: 18 BRPM | HEIGHT: 66 IN

## 2025-03-11 DIAGNOSIS — O20.0 THREATENED ABORTION: Primary | ICD-10-CM

## 2025-03-11 LAB
ABO GROUP BLD: NORMAL
B-HCG UR QL: POSITIVE
BACTERIA UR QL AUTO: ABNORMAL /HPF
BILIRUB UR QL STRIP: NEGATIVE
BLD GP AB SCN SERPL QL: NEGATIVE
CLARITY UR: CLEAR
COLOR UR: YELLOW
EXPIRATION DATE: ABNORMAL
GLUCOSE UR STRIP-MCNC: NEGATIVE MG/DL
HGB UR QL STRIP.AUTO: ABNORMAL
HOLD SPECIMEN: NORMAL
HYALINE CASTS UR QL AUTO: ABNORMAL /LPF
INTERNAL NEGATIVE CONTROL: NEGATIVE
INTERNAL POSITIVE CONTROL: POSITIVE
KETONES UR QL STRIP: NEGATIVE
LEUKOCYTE ESTERASE UR QL STRIP.AUTO: NEGATIVE
Lab: ABNORMAL
NITRITE UR QL STRIP: NEGATIVE
NUMBER OF DOSES: NORMAL
PH UR STRIP.AUTO: 7 [PH] (ref 5–8)
PROT UR QL STRIP: NEGATIVE
RBC # UR STRIP: ABNORMAL /HPF
REF LAB TEST METHOD: ABNORMAL
RH BLD: NEGATIVE
SP GR UR STRIP: 1.01 (ref 1–1.03)
SQUAMOUS #/AREA URNS HPF: ABNORMAL /HPF
UROBILINOGEN UR QL STRIP: ABNORMAL
WBC # UR STRIP: ABNORMAL /HPF
WHOLE BLOOD HOLD COAG: NORMAL
WHOLE BLOOD HOLD SPECIMEN: NORMAL

## 2025-03-11 PROCEDURE — 87086 URINE CULTURE/COLONY COUNT: CPT | Performed by: STUDENT IN AN ORGANIZED HEALTH CARE EDUCATION/TRAINING PROGRAM

## 2025-03-11 PROCEDURE — 76817 TRANSVAGINAL US OBSTETRIC: CPT

## 2025-03-11 PROCEDURE — 86900 BLOOD TYPING SEROLOGIC ABO: CPT

## 2025-03-11 PROCEDURE — 76815 OB US LIMITED FETUS(S): CPT

## 2025-03-11 PROCEDURE — 81001 URINALYSIS AUTO W/SCOPE: CPT | Performed by: STUDENT IN AN ORGANIZED HEALTH CARE EDUCATION/TRAINING PROGRAM

## 2025-03-11 PROCEDURE — 36415 COLL VENOUS BLD VENIPUNCTURE: CPT

## 2025-03-11 PROCEDURE — 86850 RBC ANTIBODY SCREEN: CPT

## 2025-03-11 PROCEDURE — 81025 URINE PREGNANCY TEST: CPT | Performed by: STUDENT IN AN ORGANIZED HEALTH CARE EDUCATION/TRAINING PROGRAM

## 2025-03-11 PROCEDURE — 86901 BLOOD TYPING SEROLOGIC RH(D): CPT

## 2025-03-11 NOTE — ED PROVIDER NOTES
Subjective   History of Present Illness  Patient is a 35-year-old female who presents emergency department today for complaint of vaginal bleeding with pregnancy.  Patient states that she is 12 weeks and 5 days pregnant.  Last menstrual cycle was 12/31/2024.  She states she Dr. Vasquez with her OB/GYN at this hospital.  She states that this evening she was showering and that she noticed that there was blood coming down her legs from her vagina.  She states that it was pretty heavy at this time.  States this began around 2300 tonight.  She has had some cramping below her umbilicus on the left side.  She is G1, P0.  Denies any vaginal discharge, concern for STD, or any urinary complaints.  Denies any chest pain, fever, chills, shortness of breath, nausea, vomiting, diarrhea, and any other symptoms.  Patient has a past medical history of attention deficit disorder, back pain, and neck pain.        Review of Systems   Constitutional: Negative.    HENT: Negative.     Eyes: Negative.    Respiratory: Negative.     Cardiovascular: Negative.    Gastrointestinal:  Positive for abdominal pain.   Endocrine: Negative.    Genitourinary:  Positive for vaginal bleeding.   Musculoskeletal: Negative.    Skin: Negative.    Allergic/Immunologic: Negative.    Neurological: Negative.    Hematological: Negative.    Psychiatric/Behavioral: Negative.     All other systems reviewed and are negative.      Past Medical History:   Diagnosis Date    Attention deficit disorder     Back pain     Medicare annual wellness visit, subsequent 08/17/2023    Neck pain        No Known Allergies    No past surgical history on file.    Family History   Problem Relation Age of Onset    No Known Problems Mother     No Known Problems Father     No Known Problems Brother        Social History     Socioeconomic History    Marital status: Single   Tobacco Use    Smoking status: Never    Smokeless tobacco: Never    Tobacco comments:     vape    Vaping Use    Vaping  status: Every Day    Substances: Nicotine, Flavoring    Devices: Disposable, Pre-filled pod   Substance and Sexual Activity    Alcohol use: No    Drug use: No    Sexual activity: Not Currently           Objective   Physical Exam  Vitals reviewed.   Constitutional:       General: She is not in acute distress.     Appearance: Normal appearance. She is obese. She is not ill-appearing or toxic-appearing.   HENT:      Head: Normocephalic and atraumatic.      Right Ear: External ear normal.      Left Ear: External ear normal.      Nose: Nose normal. No congestion or rhinorrhea.      Mouth/Throat:      Mouth: Mucous membranes are moist.      Pharynx: Oropharynx is clear. No oropharyngeal exudate or posterior oropharyngeal erythema.   Eyes:      Extraocular Movements: Extraocular movements intact.      Conjunctiva/sclera: Conjunctivae normal.   Neck:      Vascular: No carotid bruit.   Cardiovascular:      Rate and Rhythm: Normal rate and regular rhythm.      Pulses: Normal pulses.      Heart sounds: Normal heart sounds. No murmur heard.     No gallop.   Pulmonary:      Effort: Pulmonary effort is normal. No respiratory distress.      Breath sounds: Normal breath sounds. No stridor. No wheezing, rhonchi or rales.   Chest:      Chest wall: No tenderness.   Abdominal:      General: Abdomen is flat. Bowel sounds are normal. There is no distension.      Palpations: Abdomen is soft. There is no mass.      Tenderness: There is abdominal tenderness in the right lower quadrant, suprapubic area and left lower quadrant. There is no right CVA tenderness, left CVA tenderness, guarding or rebound.      Hernia: No hernia is present.      Comments: Abdomen is soft, nondistended, tender to palpation in the bilateral suprapubic area, no CVA tenderness, no guarding, no rebound, no hernias, no masses, no signs of injury.   Musculoskeletal:         General: No swelling, tenderness, deformity or signs of injury. Normal range of motion.       Cervical back: Normal range of motion and neck supple. No rigidity.      Right lower leg: No edema.      Left lower leg: No edema.   Lymphadenopathy:      Cervical: No cervical adenopathy.   Skin:     General: Skin is warm and dry.      Capillary Refill: Capillary refill takes less than 2 seconds.      Coloration: Skin is not jaundiced or pale.      Findings: No bruising, erythema, lesion or rash.   Neurological:      General: No focal deficit present.      Mental Status: She is alert and oriented to person, place, and time. Mental status is at baseline.      Cranial Nerves: No cranial nerve deficit.      Sensory: No sensory deficit.      Motor: No weakness.      Coordination: Coordination normal.      Gait: Gait normal.      Deep Tendon Reflexes: Reflexes normal.   Psychiatric:         Mood and Affect: Mood normal.         Behavior: Behavior normal.         Thought Content: Thought content normal.         Judgment: Judgment normal.         Procedures           ED Course  ED Course as of 03/11/25 1804 Tue Mar 11, 2025   0153 Transferred care to Dr. Banuelos. Awaiting US and RH result.  [BS]   0159 35 y.o F here for vaginal bleeding with mild cramping. Vital signs stable. Pending US and RH [SG]      ED Course User Index  [BS] Adriana Apodaca, APRN  [SG] Moris Banuelos MD                                                       Medical Decision Making  Patient is a 35-year-old female who presents emergency department today for complaint of vaginal bleeding with pregnancy.  Patient states that she is 12 weeks and 5 days pregnant.  Last menstrual cycle was 12/31/2024.  She states she Dr. Vasquez with her OB/GYN at this hospital.  She states that this evening she was showering and that she noticed that there was blood coming down her legs from her vagina.  She states that it was pretty heavy at this time.  States this began around 2300 tonight.  She has had some cramping below her umbilicus on the left side.  She is G1,  P0. Denies any vaginal discharge, concern for STD, or any urinary complaints.  Denies any chest pain, fever, chills, shortness of breath, nausea, vomiting, diarrhea, and any other symptoms.  Patient has a past medical history of attention deficit disorder, back pain, and neck pain.      [facility] Dr. Banuelos: PT was signed out to me by midlevel due to end of her shift, positive for concerns of possible threatened miscarriage vs miscarriage positive for vaginal bleeding  in the setting of 11-12 weeks gestation. PT ask to be discharge multiple times. I explained to her waiting on results on RH as well as US. Finally when results came back I explained to the PT that she had a live product with mild fetal tachycardia, as well as concerns for subchorionic hemorrhage, concerning for threatened AB. We discussed pelvic rest, as well as close follow up with OB. I also discussed with the PT RH negative and need for Rhogham, for which PT did not wish to wait any longer we discussed the risk of morbidity in future pregnancies. As such PT was discharged, on her way out she went to the bathroom, I was approach by her partner who explained to me she was wiping blood, I again offered a pelvic exam for reevaluation but PT declined. She was encouraged to return with worsening bleeding, pelvic pain or fever. PT will follow up with OB      Problems Addressed:  Threatened : complicated acute illness or injury    Amount and/or Complexity of Data Reviewed  Labs: ordered.  Radiology: ordered.        Final diagnoses:   Threatened        ED Disposition  ED Disposition       ED Disposition   Discharge    Condition   Stable    Comment   --               No follow-up provider specified.       Medication List      No changes were made to your prescriptions during this visit.            Moris Banuelos MD  25 1806       Moris Banuelos MD  25 1805

## 2025-03-11 NOTE — DISCHARGE INSTRUCTIONS
As discussed please follow-up with your OB. Pelvic rest recommended. Return with worsening pelvic pain and vaginal bleeding.

## 2025-03-12 LAB — BACTERIA SPEC AEROBE CULT: NO GROWTH

## 2025-03-21 ENCOUNTER — HOSPITAL ENCOUNTER (OUTPATIENT)
Age: 35
Discharge: HOME | End: 2025-03-21
Attending: OBSTETRICS & GYNECOLOGY | Admitting: OBSTETRICS & GYNECOLOGY
Payer: MEDICARE

## 2025-03-21 LAB
ABO/RH: NORMAL
ANTIBODY SCREEN: NORMAL
RHIG LOT NUMBER: NORMAL

## 2025-03-21 PROCEDURE — 99211 OFF/OP EST MAY X REQ PHY/QHP: CPT

## 2025-03-21 PROCEDURE — 6360000002 HC RX W HCPCS: Performed by: OBSTETRICS & GYNECOLOGY

## 2025-03-21 PROCEDURE — 96372 THER/PROPH/DIAG INJ SC/IM: CPT

## 2025-03-21 RX ADMIN — HUMAN RHO(D) IMMUNE GLOBULIN 300 MCG: 300 INJECTION, SOLUTION INTRAMUSCULAR at 13:33

## 2025-03-21 NOTE — PROGRESS NOTES
Patient presents for Rhogam d/t vaginal bleeding. Dr. Bender advised to present for injection. This nurse injected Rhogam in Right Deltoid. Pt denies any other complaints.  JUS Gallegos

## 2025-03-28 PROBLEM — Z29.13 NEED FOR RHOGAM DUE TO RH NEGATIVE MOTHER: Status: ACTIVE | Noted: 2025-03-28

## 2025-06-12 ENCOUNTER — OFFICE VISIT (OUTPATIENT)
Dept: FAMILY MEDICINE CLINIC | Facility: CLINIC | Age: 35
End: 2025-06-12
Payer: MEDICARE

## 2025-06-12 VITALS
HEIGHT: 66 IN | BODY MASS INDEX: 38.67 KG/M2 | SYSTOLIC BLOOD PRESSURE: 121 MMHG | WEIGHT: 240.6 LBS | DIASTOLIC BLOOD PRESSURE: 79 MMHG | OXYGEN SATURATION: 98 % | HEART RATE: 80 BPM

## 2025-06-12 DIAGNOSIS — Z3A.26 26 WEEKS GESTATION OF PREGNANCY: ICD-10-CM

## 2025-06-12 DIAGNOSIS — F41.8 MIXED ANXIETY AND DEPRESSIVE DISORDER: Primary | ICD-10-CM

## 2025-06-12 DIAGNOSIS — G56.01 CARPAL TUNNEL SYNDROME OF RIGHT WRIST: ICD-10-CM

## 2025-06-12 RX ORDER — SERTRALINE HYDROCHLORIDE 25 MG/1
25 TABLET, FILM COATED ORAL DAILY
Qty: 30 TABLET | Refills: 1 | Status: SHIPPED | OUTPATIENT
Start: 2025-06-12

## 2025-06-18 ENCOUNTER — HOSPITAL ENCOUNTER (OUTPATIENT)
Age: 35
Discharge: HOME | End: 2025-06-18
Attending: OBSTETRICS & GYNECOLOGY | Admitting: OBSTETRICS & GYNECOLOGY
Payer: MEDICARE

## 2025-06-18 VITALS — SYSTOLIC BLOOD PRESSURE: 119 MMHG | DIASTOLIC BLOOD PRESSURE: 47 MMHG | HEART RATE: 88 BPM

## 2025-06-18 PROBLEM — Z3A.26 26 WEEKS GESTATION OF PREGNANCY: Status: ACTIVE | Noted: 2025-06-18

## 2025-06-18 PROCEDURE — 99202 OFFICE O/P NEW SF 15 MIN: CPT

## 2025-06-18 RX ORDER — ASPIRIN 81 MG/1
81 TABLET, CHEWABLE ORAL DAILY
COMMUNITY

## 2025-06-18 RX ORDER — MULTIVITAMIN WITH IRON
1 TABLET ORAL DAILY
COMMUNITY

## 2025-06-18 NOTE — FLOWSHEET NOTE
Pt presents stating she doesn't feel her baby moving.  EFM placed and she denies leaking of fluid, vaginal bleeding and upon clarification, she NEVER feels her baby move.  Upon discussion, she states she has an anterior placenta.

## 2025-06-18 NOTE — FLOWSHEET NOTE
Pt discharged home with strong, steady gait with her mom and aunt,  all questions answered at this time.

## 2025-07-11 ENCOUNTER — OFFICE VISIT (OUTPATIENT)
Dept: FAMILY MEDICINE CLINIC | Facility: CLINIC | Age: 35
End: 2025-07-11
Payer: MEDICARE

## 2025-07-11 VITALS
DIASTOLIC BLOOD PRESSURE: 83 MMHG | OXYGEN SATURATION: 99 % | HEIGHT: 66 IN | RESPIRATION RATE: 14 BRPM | WEIGHT: 245 LBS | SYSTOLIC BLOOD PRESSURE: 126 MMHG | HEART RATE: 106 BPM | BODY MASS INDEX: 39.37 KG/M2

## 2025-07-11 DIAGNOSIS — F41.8 MIXED ANXIETY AND DEPRESSIVE DISORDER: Primary | ICD-10-CM

## 2025-07-11 DIAGNOSIS — Z3A.30 30 WEEKS GESTATION OF PREGNANCY: ICD-10-CM

## 2025-07-11 RX ORDER — SERTRALINE HYDROCHLORIDE 25 MG/1
25 TABLET, FILM COATED ORAL DAILY
Qty: 30 TABLET | Refills: 5 | Status: SHIPPED | OUTPATIENT
Start: 2025-07-11

## 2025-07-11 NOTE — PROGRESS NOTES
"Chief Complaint  Mixed anxiety and depressive disorder    Subjective    History of Present Illness      Patient presents to [facility] for   History of Present Illness  Patient is here today for a medication follow up on her zoloft. She states that she is doing wonderful and needs a refill.           Objective   Vital Signs:   /83   Pulse 106   Resp 14   Ht 167.6 cm (65.98\")   Wt 111 kg (245 lb)   SpO2 99%   BMI 39.56 kg/m²     Class 2 Severe Obesity (BMI >=35 and <=39.9). Obesity-related health conditions include the following: none. Obesity is unchanged. BMI is pregant. We discussed portion control and increasing exercise.      Physical Exam     SANIA-7:      PHQ-2 Depression Screening    Little interest or pleasure in doing things?     Feeling down, depressed, or hopeless?     PHQ-2 Total Score        PHQ-9 Depression Screening  Little interest or pleasure in doing things?     Feeling down, depressed, or hopeless?     PHQ-2 Total Score     Trouble falling or staying asleep, or sleeping too much?     Feeling tired or having little energy?     Poor appetite or overeating?     Feeling bad about yourself - or that you are a failure or have let yourself or your family down?     Trouble concentrating on things, such as reading the newspaper or watching television?     Moving or speaking so slowly that other people could have noticed? Or the opposite - being so fidgety or restless that you have been moving around a lot more than usual?     Thoughts that you would be better off dead, or of hurting yourself in some way?     PHQ-9 Total Score     If you checked off any problems, how difficult have these problems made it for you to do your work, take care of things at home, or get along with other people?             Result Review  Data Reviewed:                   Assessment and Plan      Diagnoses and all orders for this visit:    1. Mixed anxiety and depressive disorder " (Primary)  Comments:  doing great. cont zoloft 25 mg  Orders:  -     sertraline (Zoloft) 25 MG tablet; Take 1 tablet by mouth Daily.  Dispense: 30 tablet; Refill: 5    2. 30 weeks gestation of pregnancy                        Follow Up   Return in about 6 months (around 1/11/2026).  Patient was given instructions and counseling regarding her condition or for health maintenance advice. Please see specific information pulled into the AVS if appropriate.

## 2025-08-19 ENCOUNTER — HOSPITAL ENCOUNTER (OUTPATIENT)
Age: 35
Discharge: HOME | End: 2025-08-19
Payer: MEDICARE

## 2025-08-19 VITALS
RESPIRATION RATE: 18 BRPM | HEART RATE: 92 BPM | DIASTOLIC BLOOD PRESSURE: 89 MMHG | OXYGEN SATURATION: 100 % | SYSTOLIC BLOOD PRESSURE: 139 MMHG | HEIGHT: 66 IN | WEIGHT: 266 LBS | BODY MASS INDEX: 42.75 KG/M2 | TEMPERATURE: 98.2 F

## 2025-08-19 PROBLEM — Z3A.35 35 WEEKS GESTATION OF PREGNANCY: Status: ACTIVE | Noted: 2025-08-19

## 2025-08-19 LAB
AMNISURE, POC: NEGATIVE
Lab: NORMAL
NEGATIVE QC PASS/FAIL: NORMAL
POSITIVE QC PASS/FAIL: NORMAL

## 2025-08-19 PROCEDURE — 99212 OFFICE O/P EST SF 10 MIN: CPT

## 2025-08-19 PROCEDURE — 84112 EVAL AMNIOTIC FLUID PROTEIN: CPT

## 2025-08-20 ENCOUNTER — HOSPITAL ENCOUNTER (OUTPATIENT)
Age: 35
Discharge: HOME | End: 2025-08-21
Attending: OBSTETRICS & GYNECOLOGY | Admitting: OBSTETRICS & GYNECOLOGY
Payer: MEDICARE

## 2025-08-20 VITALS — HEART RATE: 92 BPM | DIASTOLIC BLOOD PRESSURE: 77 MMHG | SYSTOLIC BLOOD PRESSURE: 146 MMHG

## 2025-08-20 PROCEDURE — 2580000003 HC RX 258: Performed by: OBSTETRICS & GYNECOLOGY

## 2025-08-20 PROCEDURE — 6360000002 HC RX W HCPCS: Performed by: OBSTETRICS & GYNECOLOGY

## 2025-08-20 RX ORDER — SERTRALINE HYDROCHLORIDE 25 MG/1
25 TABLET, FILM COATED ORAL DAILY
COMMUNITY

## 2025-08-20 RX ADMIN — IRON SUCROSE 400 MG: 20 INJECTION, SOLUTION INTRAVENOUS at 22:51

## 2025-08-21 DIAGNOSIS — O99.013 ANEMIA DURING PREGNANCY IN THIRD TRIMESTER: Primary | ICD-10-CM

## 2025-08-21 PROCEDURE — 99212 OFFICE O/P EST SF 10 MIN: CPT

## 2025-08-21 RX ORDER — ONDANSETRON 2 MG/ML
8 INJECTION INTRAMUSCULAR; INTRAVENOUS
OUTPATIENT
Start: 2025-08-21

## 2025-08-21 RX ORDER — ALBUTEROL SULFATE 90 UG/1
4 INHALANT RESPIRATORY (INHALATION) PRN
OUTPATIENT
Start: 2025-08-21

## 2025-08-21 RX ORDER — HYDROCORTISONE SODIUM SUCCINATE 100 MG/2ML
100 INJECTION INTRAMUSCULAR; INTRAVENOUS
OUTPATIENT
Start: 2025-08-21

## 2025-08-21 RX ORDER — SODIUM CHLORIDE 9 MG/ML
5-250 INJECTION, SOLUTION INTRAVENOUS PRN
OUTPATIENT
Start: 2025-08-21

## 2025-08-21 RX ORDER — SODIUM CHLORIDE 9 MG/ML
INJECTION, SOLUTION INTRAVENOUS PRN
OUTPATIENT
Start: 2025-08-21

## 2025-08-21 RX ORDER — ACETAMINOPHEN 325 MG/1
650 TABLET ORAL
OUTPATIENT
Start: 2025-08-21

## 2025-08-21 RX ORDER — HEPARIN 100 UNIT/ML
500 SYRINGE INTRAVENOUS PRN
OUTPATIENT
Start: 2025-08-21

## 2025-08-21 RX ORDER — EPINEPHRINE 1 MG/ML
0.3 INJECTION, SOLUTION, CONCENTRATE INTRAVENOUS PRN
OUTPATIENT
Start: 2025-08-21

## 2025-08-21 RX ORDER — DIPHENHYDRAMINE HYDROCHLORIDE 50 MG/ML
50 INJECTION, SOLUTION INTRAMUSCULAR; INTRAVENOUS
OUTPATIENT
Start: 2025-08-21

## 2025-08-21 RX ORDER — SODIUM CHLORIDE 0.9 % (FLUSH) 0.9 %
5-40 SYRINGE (ML) INJECTION PRN
OUTPATIENT
Start: 2025-08-21

## 2025-08-22 ENCOUNTER — HOSPITAL ENCOUNTER (OUTPATIENT)
Age: 35
Discharge: HOME | End: 2025-08-22
Attending: ADVANCED PRACTICE MIDWIFE | Admitting: ADVANCED PRACTICE MIDWIFE
Payer: MEDICAID

## 2025-08-22 VITALS
DIASTOLIC BLOOD PRESSURE: 73 MMHG | RESPIRATION RATE: 16 BRPM | HEIGHT: 66 IN | SYSTOLIC BLOOD PRESSURE: 127 MMHG | OXYGEN SATURATION: 100 % | BODY MASS INDEX: 42.91 KG/M2 | TEMPERATURE: 98.1 F | HEART RATE: 76 BPM | WEIGHT: 267 LBS

## 2025-08-22 PROBLEM — Z3A.36 36 WEEKS GESTATION OF PREGNANCY: Status: ACTIVE | Noted: 2025-08-22

## 2025-08-22 PROCEDURE — 99212 OFFICE O/P EST SF 10 MIN: CPT

## 2025-08-22 RX ORDER — LORATADINE 10 MG/1
10 TABLET ORAL DAILY
COMMUNITY

## 2025-08-22 RX ORDER — ONDANSETRON 4 MG/1
4 TABLET, FILM COATED ORAL EVERY 8 HOURS PRN
COMMUNITY

## 2025-08-28 ENCOUNTER — HOSPITAL ENCOUNTER (OUTPATIENT)
Age: 35
Discharge: HOME | End: 2025-08-28
Attending: OBSTETRICS & GYNECOLOGY | Admitting: OBSTETRICS & GYNECOLOGY
Payer: MEDICARE

## 2025-08-28 VITALS
HEART RATE: 80 BPM | OXYGEN SATURATION: 100 % | SYSTOLIC BLOOD PRESSURE: 141 MMHG | TEMPERATURE: 96.9 F | DIASTOLIC BLOOD PRESSURE: 77 MMHG | RESPIRATION RATE: 20 BRPM

## 2025-08-28 PROBLEM — D50.9 IRON DEFICIENCY ANEMIA: Status: ACTIVE | Noted: 2025-08-28

## 2025-08-28 PROCEDURE — 96366 THER/PROPH/DIAG IV INF ADDON: CPT

## 2025-08-28 PROCEDURE — 96365 THER/PROPH/DIAG IV INF INIT: CPT

## 2025-08-28 PROCEDURE — 96367 TX/PROPH/DG ADDL SEQ IV INF: CPT

## 2025-08-28 PROCEDURE — 99213 OFFICE O/P EST LOW 20 MIN: CPT

## 2025-08-28 PROCEDURE — 6360000002 HC RX W HCPCS: Performed by: OBSTETRICS & GYNECOLOGY

## 2025-08-28 PROCEDURE — 2580000003 HC RX 258: Performed by: OBSTETRICS & GYNECOLOGY

## 2025-08-28 RX ORDER — SODIUM CHLORIDE 9 MG/ML
INJECTION, SOLUTION INTRAVENOUS CONTINUOUS
Status: DISCONTINUED | OUTPATIENT
Start: 2025-08-28 | End: 2025-08-28 | Stop reason: HOSPADM

## 2025-08-28 RX ADMIN — IRON SUCROSE 400 MG: 20 INJECTION, SOLUTION INTRAVENOUS at 14:58

## 2025-08-28 RX ADMIN — SODIUM CHLORIDE: 9 INJECTION, SOLUTION INTRAVENOUS at 14:57
